# Patient Record
Sex: MALE | Race: BLACK OR AFRICAN AMERICAN | Employment: OTHER | URBAN - METROPOLITAN AREA
[De-identification: names, ages, dates, MRNs, and addresses within clinical notes are randomized per-mention and may not be internally consistent; named-entity substitution may affect disease eponyms.]

---

## 2017-09-05 PROBLEM — N18.30 STAGE 3 CHRONIC KIDNEY DISEASE (HCC): Status: ACTIVE | Noted: 2017-09-05

## 2017-09-05 PROBLEM — I50.22 CHRONIC SYSTOLIC HEART FAILURE (HCC): Status: ACTIVE | Noted: 2017-09-05

## 2018-03-13 LAB
LEFT VENTRICULAR EJECTION FRACTION HIGH VALUE: 30 %
LEFT VENTRICULAR EJECTION FRACTION MODE: NORMAL
LV EF: 25 %

## 2018-04-02 ENCOUNTER — OFFICE VISIT (OUTPATIENT)
Dept: CARDIOLOGY CLINIC | Age: 73
End: 2018-04-02
Payer: MEDICARE

## 2018-04-02 ENCOUNTER — HOSPITAL ENCOUNTER (OUTPATIENT)
Age: 73
Discharge: HOME | End: 2018-04-02
Payer: MEDICARE

## 2018-04-02 VITALS
HEIGHT: 73 IN | WEIGHT: 213 LBS | HEART RATE: 85 BPM | DIASTOLIC BLOOD PRESSURE: 60 MMHG | BODY MASS INDEX: 28.23 KG/M2 | SYSTOLIC BLOOD PRESSURE: 92 MMHG | RESPIRATION RATE: 20 BRPM

## 2018-04-02 DIAGNOSIS — I42.8 CARDIOMYOPATHY, NONISCHEMIC (HCC): ICD-10-CM

## 2018-04-02 DIAGNOSIS — E78.00 PURE HYPERCHOLESTEROLEMIA: ICD-10-CM

## 2018-04-02 DIAGNOSIS — I42.8 CARDIOMYOPATHY, NONISCHEMIC (HCC): Primary | ICD-10-CM

## 2018-04-02 DIAGNOSIS — N18.30 STAGE 3 CHRONIC KIDNEY DISEASE (HCC): ICD-10-CM

## 2018-04-02 DIAGNOSIS — I10 ESSENTIAL HYPERTENSION: ICD-10-CM

## 2018-04-02 DIAGNOSIS — I48.21 PERMANENT ATRIAL FIBRILLATION (HCC): ICD-10-CM

## 2018-04-02 DIAGNOSIS — I50.22 CHRONIC SYSTOLIC HEART FAILURE (HCC): ICD-10-CM

## 2018-04-02 LAB
ANION GAP SERPL CALCULATED.3IONS-SCNC: 12 MMOL/L (ref 7–16)
BUN BLDV-MCNC: 38 MG/DL (ref 8–23)
CALCIUM SERPL-MCNC: 9.4 MG/DL (ref 8.6–10.2)
CHLORIDE BLD-SCNC: 99 MMOL/L (ref 98–107)
CO2: 31 MMOL/L (ref 22–29)
CREAT SERPL-MCNC: 2 MG/DL (ref 0.7–1.2)
GFR AFRICAN AMERICAN: 40
GFR NON-AFRICAN AMERICAN: 40 ML/MIN/1.73
GLUCOSE BLD-MCNC: 140 MG/DL (ref 74–109)
POTASSIUM SERPL-SCNC: 4.3 MMOL/L (ref 3.5–5)
SODIUM BLD-SCNC: 142 MMOL/L (ref 132–146)

## 2018-04-02 PROCEDURE — 99214 OFFICE O/P EST MOD 30 MIN: CPT | Performed by: INTERNAL MEDICINE

## 2018-04-02 PROCEDURE — 3017F COLORECTAL CA SCREEN DOC REV: CPT | Performed by: INTERNAL MEDICINE

## 2018-04-02 PROCEDURE — 4040F PNEUMOC VAC/ADMIN/RCVD: CPT | Performed by: INTERNAL MEDICINE

## 2018-04-02 PROCEDURE — 80048 BASIC METABOLIC PNL TOTAL CA: CPT

## 2018-04-02 PROCEDURE — G8427 DOCREV CUR MEDS BY ELIG CLIN: HCPCS | Performed by: INTERNAL MEDICINE

## 2018-04-02 PROCEDURE — 36415 COLL VENOUS BLD VENIPUNCTURE: CPT

## 2018-04-02 PROCEDURE — 93000 ELECTROCARDIOGRAM COMPLETE: CPT | Performed by: INTERNAL MEDICINE

## 2018-04-02 PROCEDURE — 1036F TOBACCO NON-USER: CPT | Performed by: INTERNAL MEDICINE

## 2018-04-02 PROCEDURE — 1123F ACP DISCUSS/DSCN MKR DOCD: CPT | Performed by: INTERNAL MEDICINE

## 2018-04-02 PROCEDURE — G8417 CALC BMI ABV UP PARAM F/U: HCPCS | Performed by: INTERNAL MEDICINE

## 2018-04-02 RX ORDER — SPIRONOLACTONE 50 MG/1
50 TABLET, FILM COATED ORAL DAILY
COMMUNITY
End: 2019-02-20 | Stop reason: ALTCHOICE

## 2018-04-03 ENCOUNTER — TELEPHONE (OUTPATIENT)
Dept: CARDIOLOGY CLINIC | Age: 73
End: 2018-04-03

## 2018-04-03 DIAGNOSIS — I10 ESSENTIAL HYPERTENSION: ICD-10-CM

## 2018-04-03 DIAGNOSIS — I42.8 CARDIOMYOPATHY, NONISCHEMIC (HCC): Primary | ICD-10-CM

## 2018-04-03 DIAGNOSIS — I48.21 PERMANENT ATRIAL FIBRILLATION (HCC): ICD-10-CM

## 2018-04-04 ENCOUNTER — HOSPITAL ENCOUNTER (OUTPATIENT)
Age: 73
Discharge: HOME | End: 2018-04-04
Payer: MEDICARE

## 2018-04-04 ENCOUNTER — TELEPHONE (OUTPATIENT)
Dept: CARDIOLOGY CLINIC | Age: 73
End: 2018-04-04

## 2018-04-04 DIAGNOSIS — I50.22 CHRONIC SYSTOLIC HEART FAILURE (HCC): ICD-10-CM

## 2018-04-04 DIAGNOSIS — I10 ESSENTIAL HYPERTENSION: ICD-10-CM

## 2018-04-04 DIAGNOSIS — I48.21 PERMANENT ATRIAL FIBRILLATION (HCC): ICD-10-CM

## 2018-04-04 DIAGNOSIS — I42.8 CARDIOMYOPATHY, NONISCHEMIC (HCC): ICD-10-CM

## 2018-04-04 DIAGNOSIS — I42.8 CARDIOMYOPATHY, NONISCHEMIC (HCC): Primary | ICD-10-CM

## 2018-04-04 LAB
ANION GAP SERPL CALCULATED.3IONS-SCNC: 11 MMOL/L (ref 7–16)
BUN BLDV-MCNC: 44 MG/DL (ref 8–23)
CALCIUM SERPL-MCNC: 9.6 MG/DL (ref 8.6–10.2)
CHLORIDE BLD-SCNC: 97 MMOL/L (ref 98–107)
CO2: 32 MMOL/L (ref 22–29)
CREAT SERPL-MCNC: 1.9 MG/DL (ref 0.7–1.2)
GFR AFRICAN AMERICAN: 42
GFR NON-AFRICAN AMERICAN: 42 ML/MIN/1.73
GLUCOSE BLD-MCNC: 100 MG/DL (ref 74–109)
POTASSIUM SERPL-SCNC: 4.2 MMOL/L (ref 3.5–5)
SODIUM BLD-SCNC: 140 MMOL/L (ref 132–146)

## 2018-04-04 PROCEDURE — 36415 COLL VENOUS BLD VENIPUNCTURE: CPT

## 2018-04-04 PROCEDURE — 80048 BASIC METABOLIC PNL TOTAL CA: CPT

## 2018-04-09 ENCOUNTER — HOSPITAL ENCOUNTER (OUTPATIENT)
Age: 73
Discharge: HOME | End: 2018-04-09
Payer: MEDICARE

## 2018-04-09 DIAGNOSIS — I42.8 CARDIOMYOPATHY, NONISCHEMIC (HCC): ICD-10-CM

## 2018-04-09 DIAGNOSIS — I48.21 PERMANENT ATRIAL FIBRILLATION (HCC): ICD-10-CM

## 2018-04-09 DIAGNOSIS — I50.22 CHRONIC SYSTOLIC HEART FAILURE (HCC): ICD-10-CM

## 2018-04-09 LAB
ANION GAP SERPL CALCULATED.3IONS-SCNC: 13 MMOL/L (ref 7–16)
BUN BLDV-MCNC: 50 MG/DL (ref 8–23)
CALCIUM SERPL-MCNC: 9.6 MG/DL (ref 8.6–10.2)
CHLORIDE BLD-SCNC: 99 MMOL/L (ref 98–107)
CO2: 30 MMOL/L (ref 22–29)
CREAT SERPL-MCNC: 2 MG/DL (ref 0.7–1.2)
GFR AFRICAN AMERICAN: 40
GFR NON-AFRICAN AMERICAN: 40 ML/MIN/1.73
GLUCOSE BLD-MCNC: 115 MG/DL (ref 74–109)
POTASSIUM SERPL-SCNC: 4.1 MMOL/L (ref 3.5–5)
SODIUM BLD-SCNC: 142 MMOL/L (ref 132–146)

## 2018-04-09 PROCEDURE — 80048 BASIC METABOLIC PNL TOTAL CA: CPT

## 2018-04-09 PROCEDURE — 36415 COLL VENOUS BLD VENIPUNCTURE: CPT

## 2018-04-20 ENCOUNTER — TELEPHONE (OUTPATIENT)
Dept: CARDIOLOGY CLINIC | Age: 73
End: 2018-04-20

## 2018-04-21 RX ORDER — ISOSORBIDE DINITRATE 10 MG/1
10 TABLET ORAL 2 TIMES DAILY
Qty: 60 TABLET | Refills: 3 | Status: SHIPPED | OUTPATIENT
Start: 2018-04-21 | End: 2018-08-20 | Stop reason: SDUPTHER

## 2018-04-21 RX ORDER — HYDRALAZINE HYDROCHLORIDE 25 MG/1
25 TABLET, FILM COATED ORAL 2 TIMES DAILY
Qty: 60 TABLET | Refills: 3 | Status: SHIPPED | OUTPATIENT
Start: 2018-04-21 | End: 2018-08-20 | Stop reason: SDUPTHER

## 2018-05-03 ENCOUNTER — OFFICE VISIT (OUTPATIENT)
Dept: CARDIOLOGY CLINIC | Age: 73
End: 2018-05-03
Payer: MEDICARE

## 2018-05-03 ENCOUNTER — HOSPITAL ENCOUNTER (OUTPATIENT)
Age: 73
Discharge: HOME | End: 2018-05-03
Payer: MEDICARE

## 2018-05-03 VITALS
DIASTOLIC BLOOD PRESSURE: 58 MMHG | BODY MASS INDEX: 28.18 KG/M2 | WEIGHT: 212.6 LBS | SYSTOLIC BLOOD PRESSURE: 100 MMHG | HEIGHT: 73 IN | HEART RATE: 74 BPM | RESPIRATION RATE: 20 BRPM

## 2018-05-03 DIAGNOSIS — I50.22 CHRONIC SYSTOLIC HEART FAILURE (HCC): ICD-10-CM

## 2018-05-03 DIAGNOSIS — I42.8 CARDIOMYOPATHY, NONISCHEMIC (HCC): Primary | ICD-10-CM

## 2018-05-03 DIAGNOSIS — N18.30 STAGE 3 CHRONIC KIDNEY DISEASE (HCC): ICD-10-CM

## 2018-05-03 DIAGNOSIS — E78.00 PURE HYPERCHOLESTEROLEMIA: ICD-10-CM

## 2018-05-03 DIAGNOSIS — I48.21 PERMANENT ATRIAL FIBRILLATION (HCC): ICD-10-CM

## 2018-05-03 DIAGNOSIS — I10 ESSENTIAL HYPERTENSION: ICD-10-CM

## 2018-05-03 LAB
ANION GAP SERPL CALCULATED.3IONS-SCNC: 15 MMOL/L (ref 7–16)
BUN BLDV-MCNC: 40 MG/DL (ref 8–23)
CALCIUM SERPL-MCNC: 9.8 MG/DL (ref 8.6–10.2)
CHLORIDE BLD-SCNC: 97 MMOL/L (ref 98–107)
CO2: 28 MMOL/L (ref 22–29)
CREAT SERPL-MCNC: 1.9 MG/DL (ref 0.7–1.2)
GFR AFRICAN AMERICAN: 42
GFR NON-AFRICAN AMERICAN: 42 ML/MIN/1.73
GLUCOSE BLD-MCNC: 87 MG/DL (ref 74–109)
POTASSIUM SERPL-SCNC: 4.5 MMOL/L (ref 3.5–5)
SODIUM BLD-SCNC: 140 MMOL/L (ref 132–146)

## 2018-05-03 PROCEDURE — 1036F TOBACCO NON-USER: CPT | Performed by: INTERNAL MEDICINE

## 2018-05-03 PROCEDURE — 93000 ELECTROCARDIOGRAM COMPLETE: CPT | Performed by: INTERNAL MEDICINE

## 2018-05-03 PROCEDURE — 80048 BASIC METABOLIC PNL TOTAL CA: CPT

## 2018-05-03 PROCEDURE — G8417 CALC BMI ABV UP PARAM F/U: HCPCS | Performed by: INTERNAL MEDICINE

## 2018-05-03 PROCEDURE — G8427 DOCREV CUR MEDS BY ELIG CLIN: HCPCS | Performed by: INTERNAL MEDICINE

## 2018-05-03 PROCEDURE — 1123F ACP DISCUSS/DSCN MKR DOCD: CPT | Performed by: INTERNAL MEDICINE

## 2018-05-03 PROCEDURE — 36415 COLL VENOUS BLD VENIPUNCTURE: CPT

## 2018-05-03 PROCEDURE — 99214 OFFICE O/P EST MOD 30 MIN: CPT | Performed by: INTERNAL MEDICINE

## 2018-05-03 PROCEDURE — 4040F PNEUMOC VAC/ADMIN/RCVD: CPT | Performed by: INTERNAL MEDICINE

## 2018-05-03 PROCEDURE — 3017F COLORECTAL CA SCREEN DOC REV: CPT | Performed by: INTERNAL MEDICINE

## 2018-05-08 ENCOUNTER — TELEPHONE (OUTPATIENT)
Dept: CARDIOLOGY CLINIC | Age: 73
End: 2018-05-08

## 2018-05-09 ENCOUNTER — NURSE ONLY (OUTPATIENT)
Dept: NON INVASIVE DIAGNOSTICS | Age: 73
End: 2018-05-09
Payer: MEDICARE

## 2018-05-09 DIAGNOSIS — I42.8 CARDIOMYOPATHY, NONISCHEMIC (HCC): ICD-10-CM

## 2018-05-09 DIAGNOSIS — I48.21 PERMANENT ATRIAL FIBRILLATION (HCC): ICD-10-CM

## 2018-05-09 DIAGNOSIS — Z95.810 S/P ICD (INTERNAL CARDIAC DEFIBRILLATOR) PROCEDURE: Primary | ICD-10-CM

## 2018-05-09 PROCEDURE — 93296 REM INTERROG EVL PM/IDS: CPT | Performed by: INTERNAL MEDICINE

## 2018-05-09 PROCEDURE — 93295 DEV INTERROG REMOTE 1/2/MLT: CPT | Performed by: INTERNAL MEDICINE

## 2018-05-17 ENCOUNTER — TELEPHONE (OUTPATIENT)
Dept: NON INVASIVE DIAGNOSTICS | Age: 73
End: 2018-05-17

## 2018-06-11 ENCOUNTER — HOSPITAL ENCOUNTER (OUTPATIENT)
Age: 73
Discharge: HOME | End: 2018-06-11
Payer: MEDICARE

## 2018-06-11 LAB — PROSTATE SPECIFIC ANTIGEN: 16.55 NG/ML (ref 0–4)

## 2018-06-11 PROCEDURE — 84153 ASSAY OF PSA TOTAL: CPT

## 2018-06-11 PROCEDURE — 36415 COLL VENOUS BLD VENIPUNCTURE: CPT

## 2018-08-13 ENCOUNTER — NURSE ONLY (OUTPATIENT)
Dept: NON INVASIVE DIAGNOSTICS | Age: 73
End: 2018-08-13
Payer: MEDICARE

## 2018-08-13 DIAGNOSIS — Z95.810 S/P ICD (INTERNAL CARDIAC DEFIBRILLATOR) PROCEDURE: Primary | ICD-10-CM

## 2018-08-13 DIAGNOSIS — I42.8 CARDIOMYOPATHY, NONISCHEMIC (HCC): ICD-10-CM

## 2018-08-13 DIAGNOSIS — I48.21 PERMANENT ATRIAL FIBRILLATION (HCC): ICD-10-CM

## 2018-08-13 PROCEDURE — 93296 REM INTERROG EVL PM/IDS: CPT | Performed by: INTERNAL MEDICINE

## 2018-08-13 PROCEDURE — 93295 DEV INTERROG REMOTE 1/2/MLT: CPT | Performed by: INTERNAL MEDICINE

## 2018-08-13 NOTE — PROGRESS NOTES
See [facility] [place] report. Remote monitoring reviewed over a 90 day period. End of 90 day monitoring period date of service 8.13.2018.

## 2018-08-20 RX ORDER — ISOSORBIDE DINITRATE 10 MG/1
10 TABLET ORAL 2 TIMES DAILY
Qty: 60 TABLET | Refills: 11 | Status: SHIPPED | OUTPATIENT
Start: 2018-08-20 | End: 2019-05-08

## 2018-08-20 RX ORDER — HYDRALAZINE HYDROCHLORIDE 25 MG/1
25 TABLET, FILM COATED ORAL 2 TIMES DAILY
Qty: 60 TABLET | Refills: 11 | Status: SHIPPED | OUTPATIENT
Start: 2018-08-20 | End: 2019-09-03 | Stop reason: SDUPTHER

## 2018-08-23 ENCOUNTER — OFFICE VISIT (OUTPATIENT)
Dept: CARDIOLOGY CLINIC | Age: 73
End: 2018-08-23
Payer: MEDICARE

## 2018-08-23 VITALS
RESPIRATION RATE: 16 BRPM | DIASTOLIC BLOOD PRESSURE: 50 MMHG | WEIGHT: 215.2 LBS | BODY MASS INDEX: 28.52 KG/M2 | OXYGEN SATURATION: 95 % | HEIGHT: 73 IN | HEART RATE: 74 BPM | SYSTOLIC BLOOD PRESSURE: 108 MMHG

## 2018-08-23 DIAGNOSIS — I10 ESSENTIAL HYPERTENSION: ICD-10-CM

## 2018-08-23 DIAGNOSIS — E78.00 PURE HYPERCHOLESTEROLEMIA: ICD-10-CM

## 2018-08-23 DIAGNOSIS — I42.8 CARDIOMYOPATHY, NONISCHEMIC (HCC): ICD-10-CM

## 2018-08-23 DIAGNOSIS — I48.21 PERMANENT ATRIAL FIBRILLATION (HCC): Primary | ICD-10-CM

## 2018-08-23 DIAGNOSIS — J44.9 CHRONIC OBSTRUCTIVE PULMONARY DISEASE, UNSPECIFIED COPD TYPE (HCC): ICD-10-CM

## 2018-08-23 DIAGNOSIS — I50.22 CHRONIC SYSTOLIC HEART FAILURE (HCC): ICD-10-CM

## 2018-08-23 DIAGNOSIS — N18.30 STAGE 3 CHRONIC KIDNEY DISEASE (HCC): ICD-10-CM

## 2018-08-23 PROCEDURE — 3017F COLORECTAL CA SCREEN DOC REV: CPT | Performed by: INTERNAL MEDICINE

## 2018-08-23 PROCEDURE — G8427 DOCREV CUR MEDS BY ELIG CLIN: HCPCS | Performed by: INTERNAL MEDICINE

## 2018-08-23 PROCEDURE — 4040F PNEUMOC VAC/ADMIN/RCVD: CPT | Performed by: INTERNAL MEDICINE

## 2018-08-23 PROCEDURE — G8926 SPIRO NO PERF OR DOC: HCPCS | Performed by: INTERNAL MEDICINE

## 2018-08-23 PROCEDURE — 1101F PT FALLS ASSESS-DOCD LE1/YR: CPT | Performed by: INTERNAL MEDICINE

## 2018-08-23 PROCEDURE — G8417 CALC BMI ABV UP PARAM F/U: HCPCS | Performed by: INTERNAL MEDICINE

## 2018-08-23 PROCEDURE — 3023F SPIROM DOC REV: CPT | Performed by: INTERNAL MEDICINE

## 2018-08-23 PROCEDURE — 99214 OFFICE O/P EST MOD 30 MIN: CPT | Performed by: INTERNAL MEDICINE

## 2018-08-23 PROCEDURE — 1123F ACP DISCUSS/DSCN MKR DOCD: CPT | Performed by: INTERNAL MEDICINE

## 2018-08-23 PROCEDURE — 93000 ELECTROCARDIOGRAM COMPLETE: CPT | Performed by: INTERNAL MEDICINE

## 2018-08-23 PROCEDURE — 1036F TOBACCO NON-USER: CPT | Performed by: INTERNAL MEDICINE

## 2018-08-23 NOTE — PATIENT INSTRUCTIONS
trans fat  · Read food labels, and try to avoid saturated and trans fats. They increase your risk of heart disease. Trans fat is found in many processed foods such as cookies and crackers. · Use olive or canola oil when you cook. Try cholesterol-lowering spreads, such as Benecol or Take Control. · Bake, broil, grill, or steam foods instead of frying them. · Choose lean meats instead of high-fat meats such as hot dogs and sausages. Cut off all visible fat when you prepare meat. · Eat fish, skinless poultry, and meat alternatives such as soy products instead of high-fat meats. Soy products, such as tofu, may be especially good for your heart. · Choose low-fat or fat-free milk and dairy products. Eat fish  · Eat at least two servings of fish a week. Certain fish, such as salmon and tuna, contain omega-3 fatty acids, which may help reduce your risk of heart attack. Eat foods high in fiber  · Eat a variety of grain products every day. Include whole-grain foods that have lots of fiber and nutrients. Examples of whole-grain foods include oats, whole wheat bread, and brown rice. · Buy whole-grain breads and cereals, instead of white bread or pastries. Limit salt and sodium  · Limit how much salt and sodium you eat to help lower your blood pressure. · Taste food before you salt it. Add only a little salt when you think you need it. With time, your taste buds will adjust to less salt. · Eat fewer snack items, fast foods, and other high-salt, processed foods. Check food labels for the amount of sodium in packaged foods. · Choose low-sodium versions of canned goods (such as soups, vegetables, and beans). Limit sugar  · Limit drinks and foods with added sugar. These include candy, desserts, and soda pop. Limit alcohol  · Limit alcohol to no more than 2 drinks a day for men and 1 drink a day for women. Too much alcohol can cause health problems. When should you call for help?   Watch closely for changes in your Arieso, and be sure to contact your doctor if:    · You would like help planning heart-healthy meals. Where can you learn more? Go to [email or web address] org and sign in to your [employer] account. Enter V137 in the ipDatatel box to learn more about \"Heart-Healthy Diet: Care Instructions. \"     If you do not have an account, please click on the \"Sign Up Now\" link. Current as of: December 6, 2017  Content Version: 11.7  © 2768-9812 [email or web address]. Care instructions adapted under license by [facility] ([facility]). If you have questions about a medical condition or this instruction, always ask your healthcare professional. [address] any warranty or liability for your use of this information.

## 2018-08-24 NOTE — PROGRESS NOTES
however he is hesitant to do so at this time. He has follow-up at the [facility] tomorrow. [address] has agreed to continue monitoring his HRs and if they remain elevated slowly uptitrate BB with close BP monitoring. 2. No changes were made in his medications today. 3. He will send remote transmissions Q91 days x3 with yearly in-office follow-up. 4. He was asked to call the office with concerns and we will be happy to see him sooner as needed. Thank you for allowing me to participate in their care. I have spent a total of 25 minutes with the patient reviewing the above stated recommendations.  And a total of >50% of that time involved face-to-face time providing counseling and or coordination of care with the other providers    Renay Snow, MSN, APRN-CNP, AGACNP, [address] [facility]      CC: Dr Chidi Acosta

## 2018-08-28 ENCOUNTER — OFFICE VISIT (OUTPATIENT)
Dept: NON INVASIVE DIAGNOSTICS | Age: 73
End: 2018-08-28
Payer: MEDICARE

## 2018-08-28 VITALS
HEIGHT: 73 IN | WEIGHT: 218.2 LBS | RESPIRATION RATE: 20 BRPM | BODY MASS INDEX: 28.92 KG/M2 | HEART RATE: 78 BPM | DIASTOLIC BLOOD PRESSURE: 60 MMHG | SYSTOLIC BLOOD PRESSURE: 106 MMHG

## 2018-08-28 DIAGNOSIS — Z95.810 S/P ICD (INTERNAL CARDIAC DEFIBRILLATOR) PROCEDURE: Primary | ICD-10-CM

## 2018-08-28 PROCEDURE — 1123F ACP DISCUSS/DSCN MKR DOCD: CPT | Performed by: NURSE PRACTITIONER

## 2018-08-28 PROCEDURE — 1036F TOBACCO NON-USER: CPT | Performed by: NURSE PRACTITIONER

## 2018-08-28 PROCEDURE — G8427 DOCREV CUR MEDS BY ELIG CLIN: HCPCS | Performed by: NURSE PRACTITIONER

## 2018-08-28 PROCEDURE — 93283 PRGRMG EVAL IMPLANTABLE DFB: CPT | Performed by: NURSE PRACTITIONER

## 2018-08-28 PROCEDURE — 1101F PT FALLS ASSESS-DOCD LE1/YR: CPT | Performed by: NURSE PRACTITIONER

## 2018-08-28 PROCEDURE — 4040F PNEUMOC VAC/ADMIN/RCVD: CPT | Performed by: NURSE PRACTITIONER

## 2018-08-28 PROCEDURE — 99213 OFFICE O/P EST LOW 20 MIN: CPT | Performed by: NURSE PRACTITIONER

## 2018-08-28 PROCEDURE — 3017F COLORECTAL CA SCREEN DOC REV: CPT | Performed by: NURSE PRACTITIONER

## 2018-08-28 PROCEDURE — G8417 CALC BMI ABV UP PARAM F/U: HCPCS | Performed by: NURSE PRACTITIONER

## 2018-08-28 ASSESSMENT — ENCOUNTER SYMPTOMS: RESPIRATORY NEGATIVE: 1

## 2018-11-14 ENCOUNTER — NURSE ONLY (OUTPATIENT)
Dept: NON INVASIVE DIAGNOSTICS | Age: 73
End: 2018-11-14
Payer: MEDICARE

## 2018-11-14 DIAGNOSIS — Z95.810 S/P ICD (INTERNAL CARDIAC DEFIBRILLATOR) PROCEDURE: Primary | ICD-10-CM

## 2018-11-14 DIAGNOSIS — I42.8 CARDIOMYOPATHY, NONISCHEMIC (HCC): ICD-10-CM

## 2018-11-14 PROCEDURE — 93296 REM INTERROG EVL PM/IDS: CPT | Performed by: INTERNAL MEDICINE

## 2018-11-14 PROCEDURE — 93295 DEV INTERROG REMOTE 1/2/MLT: CPT | Performed by: INTERNAL MEDICINE

## 2018-11-15 ENCOUNTER — HOSPITAL ENCOUNTER (OUTPATIENT)
Age: 73
Discharge: HOME | End: 2018-11-15
Payer: MEDICARE

## 2018-11-15 LAB — PROSTATE SPECIFIC ANTIGEN: 19.06 NG/ML (ref 0–4)

## 2018-11-15 PROCEDURE — 84153 ASSAY OF PSA TOTAL: CPT

## 2018-11-15 PROCEDURE — 36415 COLL VENOUS BLD VENIPUNCTURE: CPT

## 2018-11-16 ENCOUNTER — TELEPHONE (OUTPATIENT)
Dept: NON INVASIVE DIAGNOSTICS | Age: 73
End: 2018-11-16

## 2018-11-16 NOTE — PROGRESS NOTES
See [facility] [place] report. Remote monitoring reviewed over a 90 day period.   End of 90 day monitoring period date of service 11.14.2018

## 2019-02-12 ENCOUNTER — HOSPITAL ENCOUNTER (OUTPATIENT)
Age: 74
Discharge: HOME | End: 2019-02-12
Payer: MEDICARE

## 2019-02-12 PROCEDURE — G0103 PSA SCREENING: HCPCS

## 2019-02-12 PROCEDURE — 84153 ASSAY OF PSA TOTAL: CPT

## 2019-02-12 PROCEDURE — 36415 COLL VENOUS BLD VENIPUNCTURE: CPT

## 2019-02-13 ENCOUNTER — TELEPHONE (OUTPATIENT)
Dept: NON INVASIVE DIAGNOSTICS | Age: 74
End: 2019-02-13

## 2019-02-13 ENCOUNTER — NURSE ONLY (OUTPATIENT)
Dept: NON INVASIVE DIAGNOSTICS | Age: 74
End: 2019-02-13
Payer: MEDICARE

## 2019-02-13 DIAGNOSIS — Z95.810 S/P ICD (INTERNAL CARDIAC DEFIBRILLATOR) PROCEDURE: Primary | ICD-10-CM

## 2019-02-13 DIAGNOSIS — I42.8 CARDIOMYOPATHY, NONISCHEMIC (HCC): ICD-10-CM

## 2019-02-13 PROCEDURE — 93295 DEV INTERROG REMOTE 1/2/MLT: CPT | Performed by: INTERNAL MEDICINE

## 2019-02-13 PROCEDURE — 93296 REM INTERROG EVL PM/IDS: CPT | Performed by: INTERNAL MEDICINE

## 2019-02-20 ENCOUNTER — TELEPHONE (OUTPATIENT)
Dept: CARDIOLOGY CLINIC | Age: 74
End: 2019-02-20

## 2019-02-20 ENCOUNTER — OFFICE VISIT (OUTPATIENT)
Dept: CARDIOLOGY CLINIC | Age: 74
End: 2019-02-20
Payer: MEDICARE

## 2019-02-20 VITALS
OXYGEN SATURATION: 94 % | WEIGHT: 219.8 LBS | RESPIRATION RATE: 16 BRPM | DIASTOLIC BLOOD PRESSURE: 58 MMHG | BODY MASS INDEX: 29.13 KG/M2 | HEIGHT: 73 IN | SYSTOLIC BLOOD PRESSURE: 104 MMHG | HEART RATE: 74 BPM

## 2019-02-20 DIAGNOSIS — I42.8 CARDIOMYOPATHY, NONISCHEMIC (HCC): Primary | ICD-10-CM

## 2019-02-20 DIAGNOSIS — I48.21 PERMANENT ATRIAL FIBRILLATION (HCC): ICD-10-CM

## 2019-02-20 DIAGNOSIS — I50.22 CHRONIC SYSTOLIC HEART FAILURE (HCC): ICD-10-CM

## 2019-02-20 DIAGNOSIS — J44.9 CHRONIC OBSTRUCTIVE PULMONARY DISEASE, UNSPECIFIED COPD TYPE (HCC): ICD-10-CM

## 2019-02-20 DIAGNOSIS — E78.00 PURE HYPERCHOLESTEROLEMIA: ICD-10-CM

## 2019-02-20 DIAGNOSIS — N18.30 STAGE 3 CHRONIC KIDNEY DISEASE (HCC): ICD-10-CM

## 2019-02-20 DIAGNOSIS — I10 ESSENTIAL HYPERTENSION: ICD-10-CM

## 2019-02-20 PROCEDURE — 3023F SPIROM DOC REV: CPT | Performed by: INTERNAL MEDICINE

## 2019-02-20 PROCEDURE — 93000 ELECTROCARDIOGRAM COMPLETE: CPT | Performed by: INTERNAL MEDICINE

## 2019-02-20 PROCEDURE — 1123F ACP DISCUSS/DSCN MKR DOCD: CPT | Performed by: INTERNAL MEDICINE

## 2019-02-20 PROCEDURE — 99214 OFFICE O/P EST MOD 30 MIN: CPT | Performed by: INTERNAL MEDICINE

## 2019-02-20 PROCEDURE — 4040F PNEUMOC VAC/ADMIN/RCVD: CPT | Performed by: INTERNAL MEDICINE

## 2019-02-20 PROCEDURE — G8484 FLU IMMUNIZE NO ADMIN: HCPCS | Performed by: INTERNAL MEDICINE

## 2019-02-20 PROCEDURE — 1036F TOBACCO NON-USER: CPT | Performed by: INTERNAL MEDICINE

## 2019-02-20 PROCEDURE — G8427 DOCREV CUR MEDS BY ELIG CLIN: HCPCS | Performed by: INTERNAL MEDICINE

## 2019-02-20 PROCEDURE — 3017F COLORECTAL CA SCREEN DOC REV: CPT | Performed by: INTERNAL MEDICINE

## 2019-02-20 PROCEDURE — 1101F PT FALLS ASSESS-DOCD LE1/YR: CPT | Performed by: INTERNAL MEDICINE

## 2019-02-20 PROCEDURE — G8417 CALC BMI ABV UP PARAM F/U: HCPCS | Performed by: INTERNAL MEDICINE

## 2019-02-20 PROCEDURE — G8926 SPIRO NO PERF OR DOC: HCPCS | Performed by: INTERNAL MEDICINE

## 2019-02-20 RX ORDER — SPIRONOLACTONE 25 MG/1
25 TABLET ORAL DAILY
COMMUNITY
End: 2019-02-20 | Stop reason: SDUPTHER

## 2019-02-20 RX ORDER — ACETAMINOPHEN 500 MG
500 TABLET ORAL PRN
COMMUNITY

## 2019-02-20 RX ORDER — SPIRONOLACTONE 25 MG/1
25 TABLET ORAL DAILY
Qty: 30 TABLET | Refills: 11 | Status: SHIPPED
Start: 2019-02-20 | End: 2020-02-05

## 2019-03-06 LAB
PROSTATE SPECIFIC ANTIGEN: 19.37 NG/ML (ref 0–4)
PROSTATE SPECIFIC ANTIGEN: ABNORMAL NG/ML (ref 0–4)

## 2019-05-08 RX ORDER — ISOSORBIDE MONONITRATE 30 MG/1
30 TABLET, EXTENDED RELEASE ORAL DAILY
Qty: 30 TABLET | Refills: 11 | Status: SHIPPED | OUTPATIENT
Start: 2019-05-08

## 2019-05-08 RX ORDER — ISOSORBIDE MONONITRATE 30 MG/1
30 TABLET, EXTENDED RELEASE ORAL DAILY
COMMUNITY
End: 2019-05-08 | Stop reason: SDUPTHER

## 2019-05-14 ENCOUNTER — TELEPHONE (OUTPATIENT)
Dept: CARDIOLOGY CLINIC | Age: 74
End: 2019-05-14

## 2019-05-14 NOTE — TELEPHONE ENCOUNTER
Please notify patient that his insurance company contacted us and we will no longer supply the isosorbide dinitrate.  He may continue what he has and then converted to the new prescription so that he will be covered by his insurance plan

## 2019-05-15 ENCOUNTER — NURSE ONLY (OUTPATIENT)
Dept: NON INVASIVE DIAGNOSTICS | Age: 74
End: 2019-05-15
Payer: MEDICARE

## 2019-05-15 DIAGNOSIS — I42.8 CARDIOMYOPATHY, NONISCHEMIC (HCC): ICD-10-CM

## 2019-05-15 DIAGNOSIS — I48.21 PERMANENT ATRIAL FIBRILLATION (HCC): ICD-10-CM

## 2019-05-15 DIAGNOSIS — Z95.810 S/P ICD (INTERNAL CARDIAC DEFIBRILLATOR) PROCEDURE: Primary | ICD-10-CM

## 2019-05-15 PROCEDURE — 93295 DEV INTERROG REMOTE 1/2/MLT: CPT | Performed by: INTERNAL MEDICINE

## 2019-05-15 PROCEDURE — 93296 REM INTERROG EVL PM/IDS: CPT | Performed by: INTERNAL MEDICINE

## 2019-05-24 ENCOUNTER — TELEPHONE (OUTPATIENT)
Dept: NON INVASIVE DIAGNOSTICS | Age: 74
End: 2019-05-24

## 2019-05-24 NOTE — TELEPHONE ENCOUNTER
----- Message from Aditya Fitch RN sent at 5/24/2019  9:05 AM EDT -----  Successful transmission received. Please call patient and give next appointment.

## 2019-05-24 NOTE — PROGRESS NOTES
See [facility] [place] report. Remote monitoring reviewed over a 90 day period. End of 90 day monitoring period date of service 5.15.2019.

## 2019-05-24 NOTE — TELEPHONE ENCOUNTER
We have received your remote transmission. Our staff will contact you if there is anything that needs to be discussed. Your next appointment is 08/15/2019 remote transmission from home    Pt is wireless.

## 2019-06-18 ENCOUNTER — HOSPITAL ENCOUNTER (OUTPATIENT)
Age: 74
Discharge: HOME | End: 2019-06-18
Payer: MEDICARE

## 2019-06-18 LAB — PROSTATE SPECIFIC ANTIGEN: 15.37 NG/ML (ref 0–4)

## 2019-06-18 PROCEDURE — 36415 COLL VENOUS BLD VENIPUNCTURE: CPT

## 2019-06-18 PROCEDURE — 84153 ASSAY OF PSA TOTAL: CPT

## 2019-08-14 ASSESSMENT — ENCOUNTER SYMPTOMS: RESPIRATORY NEGATIVE: 1

## 2019-08-14 NOTE — PROGRESS NOTES
breath sounds diminished scattered bibasilar crackles. No respiratory distress. Home O2 therapy. Abdominal: Soft. Normal appearance and bowel sounds are normal. No tenderness. Musculoskeletal: Normal range of motion of all extremities, no muscle weakness. Neurological: Alert and oriented to person, place, and time. Gait normal.   Skin: Skin is warm and dry. No bruising, no ecchymosis and no rash noted. Extremity: No clubbing or cyanosis. No edema. Psychiatric: Normal mood and affect. Thought content normal.   ICD site: stable, well healed, no evidence of erosion/infection. Device Interrogation/Reprogramming 8/20/19   Make/Model BSCI E110. DOI 6/21/11  Mode DDDR 70/120 ppm  F wave: 6.7 mV  Impedance: 605 ohms   Threshold: N/A 2/2 AF  RV R wave: 14.5 mV  Impedance: 449 ohms   Threshold: 1.0 V @ 0.5 ms  Pacing: A: <1%  RV: 40%    Battery Voltage/Longevity: 1.5 years    Arrhythmias: Permanent AF, NSVT. No ICD therapies. Reprogramming included: None. Overall device function is normal  All device programmable settings were evaluated per above and in the scanned document, along with iterative adjustments (capture thresholds) to assess and select the most appropriate final programming to provide for consistent delivery of the appropriate therapy and to verify function of the device. TTE(3/2016):  Summary  Severely reduced left ventricular systolic function. Ejection fraction is visually estimated at 25-30%. Mildly dilated right ventricle with normal right ventricular function. Severely dilated left atrium by volume index. Moderate mitral regurgitation. Mild aortic regurgitation. Moderate tricuspid regurgitation. PASP is estimated at 43 mmHg.   Mild pulmonic regurgitation.    TTE(1/10/2018):  Summary   Severly dilated left ventricle.   Septal motion consistent with paced rhythm .   Stage III diastolic dysfunction.   Ejection fraction is visually estimated at 35%.   Moderate mitral regurgitation is present.   Moderate tricuspid regurgitation.   Pulmonary hypertension is moderate .   Severe bi-atrial enlargement    I have independently reviewed all of the ECGs and rhythm strips per above. I have personally reviewed the laboratory, cardiac diagnostic and radiographic testing as outlined above. I have reviewed previous records noted in [facility] and Care Everywhere.     Impression:    1. NICMP  A. LVEF 25-30% by TTE--3/2016  B. NYHA Class II congestive heart failure. C. GDMT:  BB: Toprol XL 50mg BID  ACE/ARB/ARNI: intolerant to Entresto (renal function) hydralazine, isosorbide dinitrate, lasix  Aldosterone antagonist: aldactone    2. Dual chamber ICD in situ  A. Upgrade pacemaker to dual chamber ICD: [place] [id number], serial # [id number] 6/21/11  B. Normal function. 3. PermanentAF (asymptomatic)  A. Paroxysmal atrial fibrillation with sinus node dysfunction. B. Previous amiodarone AA drug therapy discontinued secondary to chronic lung issues by Dr Sravanthi Cardenas 2013  C. S/P CV 10/29/14- back in AF  D. He is not interested in other AAD therapy   E. [id number] score 3  F. Coumadin OAC    4. Hypothyroidism    5. HLD    6. HTN    7. Asbestosis/COPD  - Dr. Serenity Solomon    8. ESTHER  - CPAP    9. ICD shock  - 2/2 AF with RVR  - Toprol XL increased to 75 mg BID  - Toprol XL decreased to 50 mg BID d/t symptomatic hypotension    Plan:    1. Mr Grimm ICD function is normal and programmed accordingly based on the above interrogation. He remains in AF which is permanent. 2. No changes were made in his medications today. 3. He will send remote transmissions Q91 days x3 with yearly in-office follow-up. 4. He was asked to call the office with concerns and we will be happy to see him sooner as needed. I have spent a total of 25 minutes with the patient reviewing the above stated recommendations.  A total of >50% of that time involved face-to-face time providing counseling and or coordination of care with the other providers    Ludwin Shah, [facility] Cardiac Electrophysiology  [address] [facility]     NOTE: This report was transcribed using voice recognition software. Every effort was made to ensure accuracy; however, inadvertent computerized transcription errors may be present.       CC: Dr Chevy Celeste

## 2019-08-15 ENCOUNTER — NURSE ONLY (OUTPATIENT)
Dept: NON INVASIVE DIAGNOSTICS | Age: 74
End: 2019-08-15
Payer: MEDICARE

## 2019-08-15 DIAGNOSIS — Z95.810 S/P ICD (INTERNAL CARDIAC DEFIBRILLATOR) PROCEDURE: Primary | ICD-10-CM

## 2019-08-15 DIAGNOSIS — I42.8 CARDIOMYOPATHY, NONISCHEMIC (HCC): ICD-10-CM

## 2019-08-15 DIAGNOSIS — I48.21 PERMANENT ATRIAL FIBRILLATION (HCC): ICD-10-CM

## 2019-08-15 PROCEDURE — 93295 DEV INTERROG REMOTE 1/2/MLT: CPT | Performed by: INTERNAL MEDICINE

## 2019-08-15 PROCEDURE — 93296 REM INTERROG EVL PM/IDS: CPT | Performed by: INTERNAL MEDICINE

## 2019-08-20 ENCOUNTER — OFFICE VISIT (OUTPATIENT)
Dept: NON INVASIVE DIAGNOSTICS | Age: 74
End: 2019-08-20
Payer: MEDICARE

## 2019-08-20 VITALS
WEIGHT: 202 LBS | BODY MASS INDEX: 27.36 KG/M2 | HEART RATE: 86 BPM | DIASTOLIC BLOOD PRESSURE: 52 MMHG | HEIGHT: 72 IN | SYSTOLIC BLOOD PRESSURE: 94 MMHG | RESPIRATION RATE: 18 BRPM

## 2019-08-20 DIAGNOSIS — Z95.810 S/P ICD (INTERNAL CARDIAC DEFIBRILLATOR) PROCEDURE: Primary | ICD-10-CM

## 2019-08-20 PROCEDURE — 93283 PRGRMG EVAL IMPLANTABLE DFB: CPT | Performed by: INTERNAL MEDICINE

## 2019-08-20 PROCEDURE — G8417 CALC BMI ABV UP PARAM F/U: HCPCS | Performed by: INTERNAL MEDICINE

## 2019-08-20 PROCEDURE — G8427 DOCREV CUR MEDS BY ELIG CLIN: HCPCS | Performed by: INTERNAL MEDICINE

## 2019-08-20 PROCEDURE — 99214 OFFICE O/P EST MOD 30 MIN: CPT | Performed by: INTERNAL MEDICINE

## 2019-08-20 PROCEDURE — 1036F TOBACCO NON-USER: CPT | Performed by: INTERNAL MEDICINE

## 2019-08-20 PROCEDURE — 1123F ACP DISCUSS/DSCN MKR DOCD: CPT | Performed by: INTERNAL MEDICINE

## 2019-08-20 PROCEDURE — 4040F PNEUMOC VAC/ADMIN/RCVD: CPT | Performed by: INTERNAL MEDICINE

## 2019-08-20 SDOH — HEALTH STABILITY: MENTAL HEALTH: HOW OFTEN DO YOU HAVE A DRINK CONTAINING ALCOHOL?: NEVER

## 2019-08-27 ENCOUNTER — OFFICE VISIT (OUTPATIENT)
Dept: CARDIOLOGY CLINIC | Age: 74
End: 2019-08-27
Payer: MEDICARE

## 2019-08-27 VITALS
SYSTOLIC BLOOD PRESSURE: 98 MMHG | HEIGHT: 73 IN | HEART RATE: 82 BPM | DIASTOLIC BLOOD PRESSURE: 50 MMHG | WEIGHT: 203.6 LBS | RESPIRATION RATE: 20 BRPM | OXYGEN SATURATION: 93 % | BODY MASS INDEX: 26.98 KG/M2

## 2019-08-27 DIAGNOSIS — I10 ESSENTIAL HYPERTENSION: ICD-10-CM

## 2019-08-27 DIAGNOSIS — N18.30 STAGE 3 CHRONIC KIDNEY DISEASE (HCC): ICD-10-CM

## 2019-08-27 DIAGNOSIS — I50.22 CHRONIC SYSTOLIC HEART FAILURE (HCC): ICD-10-CM

## 2019-08-27 DIAGNOSIS — J44.9 CHRONIC OBSTRUCTIVE PULMONARY DISEASE, UNSPECIFIED COPD TYPE (HCC): ICD-10-CM

## 2019-08-27 DIAGNOSIS — I42.8 CARDIOMYOPATHY, NONISCHEMIC (HCC): Primary | ICD-10-CM

## 2019-08-27 DIAGNOSIS — I48.21 PERMANENT ATRIAL FIBRILLATION (HCC): ICD-10-CM

## 2019-08-27 DIAGNOSIS — E78.00 PURE HYPERCHOLESTEROLEMIA: ICD-10-CM

## 2019-08-27 PROCEDURE — 3023F SPIROM DOC REV: CPT | Performed by: INTERNAL MEDICINE

## 2019-08-27 PROCEDURE — 99214 OFFICE O/P EST MOD 30 MIN: CPT | Performed by: INTERNAL MEDICINE

## 2019-08-27 PROCEDURE — G8926 SPIRO NO PERF OR DOC: HCPCS | Performed by: INTERNAL MEDICINE

## 2019-08-27 PROCEDURE — 1123F ACP DISCUSS/DSCN MKR DOCD: CPT | Performed by: INTERNAL MEDICINE

## 2019-08-27 PROCEDURE — 93000 ELECTROCARDIOGRAM COMPLETE: CPT | Performed by: INTERNAL MEDICINE

## 2019-08-27 PROCEDURE — G8417 CALC BMI ABV UP PARAM F/U: HCPCS | Performed by: INTERNAL MEDICINE

## 2019-08-27 PROCEDURE — 4040F PNEUMOC VAC/ADMIN/RCVD: CPT | Performed by: INTERNAL MEDICINE

## 2019-08-27 PROCEDURE — 1036F TOBACCO NON-USER: CPT | Performed by: INTERNAL MEDICINE

## 2019-08-27 PROCEDURE — G8427 DOCREV CUR MEDS BY ELIG CLIN: HCPCS | Performed by: INTERNAL MEDICINE

## 2019-08-27 RX ORDER — WARFARIN SODIUM 3 MG/1
TABLET ORAL
Refills: 5 | COMMUNITY
Start: 2019-08-05

## 2019-08-27 RX ORDER — WARFARIN SODIUM 2 MG/1
TABLET ORAL
Refills: 3 | COMMUNITY
Start: 2019-07-07

## 2019-08-27 NOTE — PROGRESS NOTES
OUTPATIENT CARDIOLOGY FOLLOW-UP    Name: Jamia Zayas    Age: 66 y.o. Primary Care Physician: Tila Wang MD    Date of Service: 8/27/2019    Chief Complaint: Nonischemic cardiomyopathy, chronic systolic heart failure, permanent atrial fibrillation, chronic obstructive lung disease, hypertension, chronic kidney disease    Interim History: Since most recent evaluation, the patient remains stable from a cardiovascular standpoint denies interim decompensation of his cardiomyopathy with persistent symptoms of exertional dyspnea of a multifactorial nature and no clinical manifestations of volume overload. He denies arrhythmia related symptoms nor discharge of his implantable cardioverted defibrillator with appropriate device function noted at most recent remote interrogation and clinical follow-up. He has noted no symptoms of a focal neurologic origin or bleeding in the face of his chronic oral anticoagulation. Review of Systems: The remainder of a complete multisystem review including consitutional, central nervous, respiratory, circulatory, gastrointestinal, genitourinary, endocrinologic, hematologic, musculoskeletal and psychiatric are negative.     Past Medical History:  Past Medical History:   Diagnosis Date    Arrhythmia     Atrial fibrillation ([address]     CHF (congestive heart failure) (HCC)     CKD (chronic kidney disease)     Hyperlipidemia     Hypertension     Nonischemic cardiomyopathy ([facility] [address]     Thyroid disease        Past Surgical History:  Past Surgical History:   Procedure Laterality Date    CARDIAC DEFIBRILLATOR PLACEMENT  6/21/11    HERNIA REPAIR         Family History:  Family History   Problem Relation Age of Onset    Cancer Father    [facility] Cancer Sister        Social History:  Social History     Socioeconomic History    Marital status: Single     Spouse name: Not on file    Number of children: Not on file    Years of education: Not on file    Highest education level:

## 2019-09-03 RX ORDER — HYDRALAZINE HYDROCHLORIDE 25 MG/1
25 TABLET, FILM COATED ORAL 2 TIMES DAILY
Qty: 60 TABLET | Refills: 11 | Status: SHIPPED
Start: 2019-09-03 | End: 2021-01-01 | Stop reason: SDUPTHER

## 2019-09-04 RX ORDER — HYDRALAZINE HYDROCHLORIDE 25 MG/1
TABLET, FILM COATED ORAL
Qty: 60 TABLET | Refills: 10 | Status: SHIPPED
Start: 2019-09-04 | End: 2021-01-01 | Stop reason: ALTCHOICE

## 2019-11-20 ENCOUNTER — NURSE ONLY (OUTPATIENT)
Dept: NON INVASIVE DIAGNOSTICS | Age: 74
End: 2019-11-20
Payer: MEDICARE

## 2019-11-20 DIAGNOSIS — I48.21 PERMANENT ATRIAL FIBRILLATION (HCC): ICD-10-CM

## 2019-11-20 DIAGNOSIS — I42.8 CARDIOMYOPATHY, NONISCHEMIC (HCC): ICD-10-CM

## 2019-11-20 DIAGNOSIS — Z95.810 S/P ICD (INTERNAL CARDIAC DEFIBRILLATOR) PROCEDURE: Primary | ICD-10-CM

## 2019-11-20 PROCEDURE — 93296 REM INTERROG EVL PM/IDS: CPT | Performed by: INTERNAL MEDICINE

## 2019-11-20 PROCEDURE — 93295 DEV INTERROG REMOTE 1/2/MLT: CPT | Performed by: INTERNAL MEDICINE

## 2019-11-27 ENCOUNTER — TELEPHONE (OUTPATIENT)
Dept: NON INVASIVE DIAGNOSTICS | Age: 74
End: 2019-11-27

## 2020-01-01 ENCOUNTER — NURSE ONLY (OUTPATIENT)
Dept: NON INVASIVE DIAGNOSTICS | Age: 75
End: 2020-01-01
Payer: MEDICARE

## 2020-01-01 ENCOUNTER — HOSPITAL ENCOUNTER (OUTPATIENT)
Age: 75
Discharge: HOME | End: 2020-06-09
Payer: MEDICARE

## 2020-01-01 ENCOUNTER — HOSPITAL ENCOUNTER (OUTPATIENT)
Age: 75
Discharge: HOME | End: 2020-12-02
Payer: MEDICARE

## 2020-01-01 ENCOUNTER — TELEPHONE (OUTPATIENT)
Dept: CARDIOLOGY CLINIC | Age: 75
End: 2020-01-01

## 2020-01-01 LAB
PROSTATE SPECIFIC ANTIGEN: 18.92 NG/ML (ref 0–4)
PROSTATE SPECIFIC ANTIGEN: 27.01 NG/ML (ref 0–4)

## 2020-01-01 PROCEDURE — 93295 DEV INTERROG REMOTE 1/2/MLT: CPT | Performed by: INTERNAL MEDICINE

## 2020-01-01 PROCEDURE — 93296 REM INTERROG EVL PM/IDS: CPT | Performed by: INTERNAL MEDICINE

## 2020-01-01 PROCEDURE — 36415 COLL VENOUS BLD VENIPUNCTURE: CPT

## 2020-01-01 PROCEDURE — 84153 ASSAY OF PSA TOTAL: CPT

## 2020-02-05 RX ORDER — SPIRONOLACTONE 25 MG/1
TABLET ORAL
Qty: 30 TABLET | Refills: 0 | Status: SHIPPED
Start: 2020-02-05 | End: 2020-03-03 | Stop reason: SDUPTHER

## 2020-02-19 ENCOUNTER — NURSE ONLY (OUTPATIENT)
Dept: NON INVASIVE DIAGNOSTICS | Age: 75
End: 2020-02-19
Payer: MEDICARE

## 2020-02-19 PROCEDURE — 93295 DEV INTERROG REMOTE 1/2/MLT: CPT | Performed by: INTERNAL MEDICINE

## 2020-02-19 PROCEDURE — 93296 REM INTERROG EVL PM/IDS: CPT | Performed by: INTERNAL MEDICINE

## 2020-03-03 RX ORDER — SPIRONOLACTONE 25 MG/1
TABLET ORAL
Qty: 30 TABLET | Refills: 5 | Status: SHIPPED
Start: 2020-03-03 | End: 2021-01-01 | Stop reason: ALTCHOICE

## 2020-03-25 ENCOUNTER — TELEPHONE (OUTPATIENT)
Dept: CARDIOLOGY CLINIC | Age: 75
End: 2020-03-25

## 2020-03-27 NOTE — TELEPHONE ENCOUNTER
----- Message from Jean-Claude Andres MD sent at 3/27/2020  9:26 AM EDT -----  Letter and laboratory studies reviewed suggest continuing medications as previously administered. Called patient re:   Above.   Lars Avila

## 2020-08-21 NOTE — PROGRESS NOTES
See [facility] [place] report. Remote monitoring reviewed over a 90 day period. End of 90 day monitoring period date of service 8.19.2020.

## 2021-01-01 ENCOUNTER — APPOINTMENT (OUTPATIENT)
Dept: GENERAL RADIOLOGY | Age: 76
DRG: 208 | End: 2021-01-01
Payer: MEDICARE

## 2021-01-01 ENCOUNTER — TELEPHONE (OUTPATIENT)
Dept: CARDIOLOGY CLINIC | Age: 76
End: 2021-01-01

## 2021-01-01 ENCOUNTER — ANESTHESIA EVENT (OUTPATIENT)
Dept: CARDIAC CATH/INVASIVE PROCEDURES | Age: 76
DRG: 208 | End: 2021-01-01
Payer: MEDICARE

## 2021-01-01 ENCOUNTER — HOSPITAL ENCOUNTER (OUTPATIENT)
Age: 76
Discharge: HOME | End: 2021-02-22
Payer: MEDICARE

## 2021-01-01 ENCOUNTER — ANESTHESIA (OUTPATIENT)
Dept: CARDIAC CATH/INVASIVE PROCEDURES | Age: 76
DRG: 208 | End: 2021-01-01
Payer: MEDICARE

## 2021-01-01 ENCOUNTER — NURSE ONLY (OUTPATIENT)
Dept: NON INVASIVE DIAGNOSTICS | Age: 76
End: 2021-01-01
Payer: MEDICARE

## 2021-01-01 ENCOUNTER — APPOINTMENT (OUTPATIENT)
Dept: CT IMAGING | Age: 76
DRG: 208 | End: 2021-01-01
Payer: MEDICARE

## 2021-01-01 ENCOUNTER — APPOINTMENT (OUTPATIENT)
Dept: ULTRASOUND IMAGING | Age: 76
DRG: 208 | End: 2021-01-01
Payer: MEDICARE

## 2021-01-01 ENCOUNTER — APPOINTMENT (OUTPATIENT)
Dept: CARDIAC CATH/INVASIVE PROCEDURES | Age: 76
DRG: 208 | End: 2021-01-01
Payer: MEDICARE

## 2021-01-01 ENCOUNTER — HOSPITAL ENCOUNTER (INPATIENT)
Age: 76
LOS: 8 days | DRG: 208 | End: 2021-03-27
Attending: EMERGENCY MEDICINE | Admitting: INTERNAL MEDICINE
Payer: MEDICARE

## 2021-01-01 ENCOUNTER — OFFICE VISIT (OUTPATIENT)
Dept: CARDIOLOGY CLINIC | Age: 76
End: 2021-01-01
Payer: MEDICARE

## 2021-01-01 ENCOUNTER — APPOINTMENT (OUTPATIENT)
Dept: NUCLEAR MEDICINE | Age: 76
DRG: 208 | End: 2021-01-01
Payer: MEDICARE

## 2021-01-01 VITALS
HEIGHT: 73 IN | WEIGHT: 214 LBS | BODY MASS INDEX: 28.36 KG/M2 | RESPIRATION RATE: 20 BRPM | DIASTOLIC BLOOD PRESSURE: 60 MMHG | HEART RATE: 105 BPM | SYSTOLIC BLOOD PRESSURE: 110 MMHG | OXYGEN SATURATION: 98 %

## 2021-01-01 VITALS
BODY MASS INDEX: 35.27 KG/M2 | RESPIRATION RATE: 18 BRPM | OXYGEN SATURATION: 68 % | WEIGHT: 266.1 LBS | HEART RATE: 98 BPM | SYSTOLIC BLOOD PRESSURE: 98 MMHG | TEMPERATURE: 99.7 F | DIASTOLIC BLOOD PRESSURE: 58 MMHG | HEIGHT: 73 IN

## 2021-01-01 DIAGNOSIS — I48.21 PERMANENT ATRIAL FIBRILLATION (HCC): ICD-10-CM

## 2021-01-01 DIAGNOSIS — I50.22 CHRONIC SYSTOLIC HEART FAILURE (HCC): ICD-10-CM

## 2021-01-01 DIAGNOSIS — E78.00 PURE HYPERCHOLESTEROLEMIA: ICD-10-CM

## 2021-01-01 DIAGNOSIS — I42.8 CARDIOMYOPATHY, NONISCHEMIC (HCC): ICD-10-CM

## 2021-01-01 DIAGNOSIS — I10 ESSENTIAL HYPERTENSION: ICD-10-CM

## 2021-01-01 DIAGNOSIS — I42.8 NONISCHEMIC CARDIOMYOPATHY (HCC): Primary | ICD-10-CM

## 2021-01-01 DIAGNOSIS — J96.01 ACUTE RESPIRATORY FAILURE WITH HYPOXIA (HCC): ICD-10-CM

## 2021-01-01 DIAGNOSIS — N18.32 STAGE 3B CHRONIC KIDNEY DISEASE (HCC): ICD-10-CM

## 2021-01-01 DIAGNOSIS — I50.9 ACUTE ON CHRONIC CONGESTIVE HEART FAILURE, UNSPECIFIED HEART FAILURE TYPE (HCC): Primary | ICD-10-CM

## 2021-01-01 DIAGNOSIS — I42.8 NONISCHEMIC CARDIOMYOPATHY (HCC): ICD-10-CM

## 2021-01-01 DIAGNOSIS — J44.9 CHRONIC OBSTRUCTIVE PULMONARY DISEASE, UNSPECIFIED COPD TYPE (HCC): ICD-10-CM

## 2021-01-01 DIAGNOSIS — Z95.810 S/P ICD (INTERNAL CARDIAC DEFIBRILLATOR) PROCEDURE: Primary | ICD-10-CM

## 2021-01-01 LAB
AADO2: 411.6 MMHG
AADO2: 420.4 MMHG
AADO2: 430 MMHG
AADO2: 470.1 MMHG
AADO2: 525.5 MMHG
AADO2: 526.9 MMHG
AADO2: 532.9 MMHG
AADO2: 562.1 MMHG
AADO2: 572 MMHG
AADO2: 576.8 MMHG
AADO2: 577.8 MMHG
AADO2: 577.8 MMHG
AADO2: 581.9 MMHG
AADO2: 596.1 MMHG
ADENOVIRUS BY PCR: NOT DETECTED
ADENOVIRUS BY PCR: NOT DETECTED
ALBUMIN SERPL-MCNC: 2.9 G/DL (ref 3.5–5.2)
ALBUMIN SERPL-MCNC: 3 G/DL (ref 3.5–5.2)
ALBUMIN SERPL-MCNC: 3.1 G/DL (ref 3.5–5.2)
ALBUMIN SERPL-MCNC: 3.2 G/DL (ref 3.5–5.2)
ALBUMIN SERPL-MCNC: 3.4 G/DL (ref 3.5–5.2)
ALBUMIN SERPL-MCNC: 3.6 G/DL (ref 3.5–5.2)
ALP BLD-CCNC: 29 U/L (ref 40–129)
ALP BLD-CCNC: 29 U/L (ref 40–129)
ALP BLD-CCNC: 30 U/L (ref 40–129)
ALP BLD-CCNC: 30 U/L (ref 40–129)
ALP BLD-CCNC: 42 U/L (ref 40–129)
ALP BLD-CCNC: 52 U/L (ref 40–129)
ALP BLD-CCNC: 58 U/L (ref 40–129)
ALP BLD-CCNC: 63 U/L (ref 40–129)
ALT SERPL-CCNC: 15 U/L (ref 0–40)
ALT SERPL-CCNC: 15 U/L (ref 0–40)
ALT SERPL-CCNC: 238 U/L (ref 0–40)
ALT SERPL-CCNC: 25 U/L (ref 0–40)
ALT SERPL-CCNC: 251 U/L (ref 0–40)
ALT SERPL-CCNC: 259 U/L (ref 0–40)
ALT SERPL-CCNC: 260 U/L (ref 0–40)
ALT SERPL-CCNC: 320 U/L (ref 0–40)
ANION GAP SERPL CALCULATED.3IONS-SCNC: 10 MMOL/L (ref 7–16)
ANION GAP SERPL CALCULATED.3IONS-SCNC: 10 MMOL/L (ref 7–16)
ANION GAP SERPL CALCULATED.3IONS-SCNC: 11 MMOL/L (ref 7–16)
ANION GAP SERPL CALCULATED.3IONS-SCNC: 11 MMOL/L (ref 7–16)
ANION GAP SERPL CALCULATED.3IONS-SCNC: 12 MMOL/L (ref 7–16)
ANION GAP SERPL CALCULATED.3IONS-SCNC: 13 MMOL/L (ref 7–16)
ANION GAP SERPL CALCULATED.3IONS-SCNC: 14 MMOL/L (ref 7–16)
ANION GAP SERPL CALCULATED.3IONS-SCNC: 14 MMOL/L (ref 7–16)
ANION GAP SERPL CALCULATED.3IONS-SCNC: 15 MMOL/L (ref 7–16)
ANION GAP SERPL CALCULATED.3IONS-SCNC: 16 MMOL/L (ref 7–16)
ANION GAP SERPL CALCULATED.3IONS-SCNC: 18 MMOL/L (ref 7–16)
ANION GAP SERPL CALCULATED.3IONS-SCNC: 7 MMOL/L (ref 7–16)
ANION GAP SERPL CALCULATED.3IONS-SCNC: 9 MMOL/L (ref 7–16)
ANION GAP SERPL CALCULATED.3IONS-SCNC: 9 MMOL/L (ref 7–16)
ANISOCYTOSIS: ABNORMAL
APPEARANCE FLUID: NORMAL
APPEARANCE FLUID: NORMAL
APTT: 42.3 SEC (ref 24.5–35.1)
APTT: 43.8 SEC (ref 24.5–35.1)
APTT: 44.8 SEC (ref 24.5–35.1)
APTT: 55.4 SEC (ref 24.5–35.1)
APTT: 66.4 SEC (ref 24.5–35.1)
APTT: 67.2 SEC (ref 24.5–35.1)
APTT: 79.1 SEC (ref 24.5–35.1)
APTT: 90.2 SEC (ref 24.5–35.1)
APTT: 96.5 SEC (ref 24.5–35.1)
AST SERPL-CCNC: 190 U/L (ref 0–39)
AST SERPL-CCNC: 195 U/L (ref 0–39)
AST SERPL-CCNC: 21 U/L (ref 0–39)
AST SERPL-CCNC: 23 U/L (ref 0–39)
AST SERPL-CCNC: 230 U/L (ref 0–39)
AST SERPL-CCNC: 239 U/L (ref 0–39)
AST SERPL-CCNC: 25 U/L (ref 0–39)
AST SERPL-CCNC: 409 U/L (ref 0–39)
B.E.: -0.8 MMOL/L (ref -3–3)
B.E.: -0.9 MMOL/L (ref -3–3)
B.E.: -1.2 MMOL/L (ref -3–3)
B.E.: -1.3 MMOL/L (ref -3–3)
B.E.: -1.3 MMOL/L (ref -3–3)
B.E.: -1.4 MMOL/L (ref -3–3)
B.E.: -1.7 MMOL/L (ref -3–3)
B.E.: -16 MMOL/L (ref -3–3)
B.E.: -16.1 MMOL/L (ref -3–3)
B.E.: -2.4 MMOL/L (ref -3–3)
B.E.: -3.4 MMOL/L (ref -3–3)
B.E.: -3.9 MMOL/L (ref -3–3)
B.E.: -4.1 MMOL/L (ref -3–3)
B.E.: -4.3 MMOL/L (ref -3–3)
B.E.: 0.5 MMOL/L (ref -3–3)
B.E.: 1 MMOL/L (ref -3–3)
BACTERIA: ABNORMAL /HPF
BACTERIA: ABNORMAL /HPF
BASOPHILIC STIPPLING: ABNORMAL
BASOPHILS ABSOLUTE: 0 E9/L (ref 0–0.2)
BASOPHILS ABSOLUTE: 0.02 E9/L (ref 0–0.2)
BASOPHILS ABSOLUTE: 0.03 E9/L (ref 0–0.2)
BASOPHILS ABSOLUTE: 0.03 E9/L (ref 0–0.2)
BASOPHILS RELATIVE PERCENT: 0.1 % (ref 0–2)
BASOPHILS RELATIVE PERCENT: 0.2 % (ref 0–2)
BASOPHILS RELATIVE PERCENT: 0.3 % (ref 0–2)
BASOPHILS RELATIVE PERCENT: 0.6 % (ref 0–2)
BASOPHILS RELATIVE PERCENT: 0.6 % (ref 0–2)
BILIRUB SERPL-MCNC: 0.6 MG/DL (ref 0–1.2)
BILIRUB SERPL-MCNC: 0.9 MG/DL (ref 0–1.2)
BILIRUB SERPL-MCNC: 0.9 MG/DL (ref 0–1.2)
BILIRUB SERPL-MCNC: 4.1 MG/DL (ref 0–1.2)
BILIRUB SERPL-MCNC: 4.2 MG/DL (ref 0–1.2)
BILIRUB SERPL-MCNC: 4.3 MG/DL (ref 0–1.2)
BILIRUB SERPL-MCNC: 4.5 MG/DL (ref 0–1.2)
BILIRUB SERPL-MCNC: 4.8 MG/DL (ref 0–1.2)
BILIRUBIN DIRECT: 0.5 MG/DL (ref 0–0.3)
BILIRUBIN DIRECT: 3.1 MG/DL (ref 0–0.3)
BILIRUBIN DIRECT: 3.1 MG/DL (ref 0–0.3)
BILIRUBIN DIRECT: 3.2 MG/DL (ref 0–0.3)
BILIRUBIN URINE: NEGATIVE
BILIRUBIN URINE: NEGATIVE
BILIRUBIN, INDIRECT: 0.4 MG/DL (ref 0–1)
BILIRUBIN, INDIRECT: 1 MG/DL (ref 0–1)
BILIRUBIN, INDIRECT: 1.1 MG/DL (ref 0–1)
BILIRUBIN, INDIRECT: 1.1 MG/DL (ref 0–1)
BLOOD CULTURE, ROUTINE: NORMAL
BLOOD, URINE: ABNORMAL
BLOOD, URINE: ABNORMAL
BORDETELLA PARAPERTUSSIS BY PCR: NOT DETECTED
BORDETELLA PARAPERTUSSIS BY PCR: NOT DETECTED
BORDETELLA PERTUSSIS BY PCR: NOT DETECTED
BORDETELLA PERTUSSIS BY PCR: NOT DETECTED
BUN BLDV-MCNC: 30 MG/DL (ref 8–23)
BUN BLDV-MCNC: 33 MG/DL (ref 8–23)
BUN BLDV-MCNC: 39 MG/DL (ref 8–23)
BUN BLDV-MCNC: 44 MG/DL (ref 8–23)
BUN BLDV-MCNC: 45 MG/DL (ref 8–23)
BUN BLDV-MCNC: 48 MG/DL (ref 8–23)
BUN BLDV-MCNC: 52 MG/DL (ref 8–23)
BUN BLDV-MCNC: 53 MG/DL (ref 8–23)
BUN BLDV-MCNC: 53 MG/DL (ref 8–23)
BUN BLDV-MCNC: 56 MG/DL (ref 8–23)
BUN BLDV-MCNC: 56 MG/DL (ref 8–23)
BUN BLDV-MCNC: 57 MG/DL (ref 8–23)
BUN BLDV-MCNC: 61 MG/DL (ref 8–23)
BUN BLDV-MCNC: 61 MG/DL (ref 8–23)
BUN BLDV-MCNC: 64 MG/DL (ref 8–23)
BUN BLDV-MCNC: 71 MG/DL (ref 8–23)
BUN BLDV-MCNC: 71 MG/DL (ref 8–23)
BUN BLDV-MCNC: 72 MG/DL (ref 8–23)
BUN BLDV-MCNC: 72 MG/DL (ref 8–23)
BUN BLDV-MCNC: 73 MG/DL (ref 8–23)
BUN BLDV-MCNC: 77 MG/DL (ref 8–23)
BUN BLDV-MCNC: 78 MG/DL (ref 8–23)
BUN BLDV-MCNC: 80 MG/DL (ref 8–23)
BUN BLDV-MCNC: 85 MG/DL (ref 8–23)
BUN BLDV-MCNC: 87 MG/DL (ref 8–23)
BURR CELLS: ABNORMAL
C-REACTIVE PROTEIN: 2.7 MG/DL (ref 0–0.4)
C-REACTIVE PROTEIN: 20.1 MG/DL (ref 0–0.4)
CALCIUM IONIZED: 1.16 MMOL/L (ref 1.15–1.33)
CALCIUM SERPL-MCNC: 8.7 MG/DL (ref 8.6–10.2)
CALCIUM SERPL-MCNC: 9 MG/DL (ref 8.6–10.2)
CALCIUM SERPL-MCNC: 9.1 MG/DL (ref 8.6–10.2)
CALCIUM SERPL-MCNC: 9.1 MG/DL (ref 8.6–10.2)
CALCIUM SERPL-MCNC: 9.2 MG/DL (ref 8.6–10.2)
CALCIUM SERPL-MCNC: 9.3 MG/DL (ref 8.6–10.2)
CALCIUM SERPL-MCNC: 9.4 MG/DL (ref 8.6–10.2)
CALCIUM SERPL-MCNC: 9.5 MG/DL (ref 8.6–10.2)
CALCIUM SERPL-MCNC: 9.6 MG/DL (ref 8.6–10.2)
CALCIUM SERPL-MCNC: 9.7 MG/DL (ref 8.6–10.2)
CALCIUM SERPL-MCNC: 9.8 MG/DL (ref 8.6–10.2)
CELL COUNT FLUID TYPE: NORMAL
CELL COUNT FLUID TYPE: NORMAL
CHLAMYDOPHILIA PNEUMONIAE BY PCR: NOT DETECTED
CHLAMYDOPHILIA PNEUMONIAE BY PCR: NOT DETECTED
CHLORIDE BLD-SCNC: 100 MMOL/L (ref 98–107)
CHLORIDE BLD-SCNC: 101 MMOL/L (ref 98–107)
CHLORIDE BLD-SCNC: 101 MMOL/L (ref 98–107)
CHLORIDE BLD-SCNC: 102 MMOL/L (ref 98–107)
CHLORIDE BLD-SCNC: 102 MMOL/L (ref 98–107)
CHLORIDE BLD-SCNC: 103 MMOL/L (ref 98–107)
CHLORIDE BLD-SCNC: 104 MMOL/L (ref 98–107)
CHLORIDE BLD-SCNC: 105 MMOL/L (ref 98–107)
CHLORIDE BLD-SCNC: 105 MMOL/L (ref 98–107)
CHLORIDE BLD-SCNC: 106 MMOL/L (ref 98–107)
CHLORIDE BLD-SCNC: 92 MMOL/L (ref 98–107)
CHLORIDE BLD-SCNC: 96 MMOL/L (ref 98–107)
CHLORIDE BLD-SCNC: 96 MMOL/L (ref 98–107)
CHLORIDE BLD-SCNC: 98 MMOL/L (ref 98–107)
CHLORIDE BLD-SCNC: 99 MMOL/L (ref 98–107)
CHLORIDE URINE RANDOM: 21 MMOL/L
CHLORIDE URINE RANDOM: 63 MMOL/L
CHOLESTEROL, TOTAL: 99 MG/DL (ref 0–199)
CK MB: 4.2 NG/ML (ref 0–7.7)
CLARITY: CLEAR
CLARITY: CLEAR
CO2: 19 MMOL/L (ref 22–29)
CO2: 20 MMOL/L (ref 22–29)
CO2: 20 MMOL/L (ref 22–29)
CO2: 21 MMOL/L (ref 22–29)
CO2: 22 MMOL/L (ref 22–29)
CO2: 23 MMOL/L (ref 22–29)
CO2: 24 MMOL/L (ref 22–29)
CO2: 26 MMOL/L (ref 22–29)
CO2: 26 MMOL/L (ref 22–29)
CO2: 27 MMOL/L (ref 22–29)
CO2: 29 MMOL/L (ref 22–29)
CO2: 30 MMOL/L (ref 22–29)
COARSE CASTS, UA: ABNORMAL /LPF (ref 0–2)
COHB: 0.5 % (ref 0–1.5)
COHB: 0.6 % (ref 0–1.5)
COHB: 0.6 % (ref 0–1.5)
COHB: 0.7 % (ref 0–1.5)
COHB: 1 % (ref 0–1.5)
COHB: 1 % (ref 0–1.5)
COHB: 1.1 % (ref 0–1.5)
COHB: 1.2 % (ref 0–1.5)
COHB: 1.3 % (ref 0–1.5)
COHB: 1.4 % (ref 0–1.5)
COHB: 1.6 % (ref 0–1.5)
COHB: 1.6 % (ref 0–1.5)
COLOR FLUID: YELLOW
COLOR FLUID: YELLOW
COLOR: ABNORMAL
COLOR: YELLOW
CORONAVIRUS 229E BY PCR: NOT DETECTED
CORONAVIRUS 229E BY PCR: NOT DETECTED
CORONAVIRUS HKU1 BY PCR: NOT DETECTED
CORONAVIRUS HKU1 BY PCR: NOT DETECTED
CORONAVIRUS NL63 BY PCR: NOT DETECTED
CORONAVIRUS NL63 BY PCR: NOT DETECTED
CORONAVIRUS OC43 BY PCR: NOT DETECTED
CORONAVIRUS OC43 BY PCR: NOT DETECTED
CORTISOL TOTAL: 30.2 MCG/DL (ref 2.68–18.4)
CREAT SERPL-MCNC: 1.7 MG/DL (ref 0.7–1.2)
CREAT SERPL-MCNC: 1.8 MG/DL (ref 0.7–1.2)
CREAT SERPL-MCNC: 2.1 MG/DL (ref 0.7–1.2)
CREAT SERPL-MCNC: 2.2 MG/DL (ref 0.7–1.2)
CREAT SERPL-MCNC: 2.3 MG/DL (ref 0.7–1.2)
CREAT SERPL-MCNC: 2.4 MG/DL (ref 0.7–1.2)
CREAT SERPL-MCNC: 2.4 MG/DL (ref 0.7–1.2)
CREAT SERPL-MCNC: 2.5 MG/DL (ref 0.7–1.2)
CREAT SERPL-MCNC: 2.5 MG/DL (ref 0.7–1.2)
CREAT SERPL-MCNC: 2.6 MG/DL (ref 0.7–1.2)
CREAT SERPL-MCNC: 2.9 MG/DL (ref 0.7–1.2)
CREAT SERPL-MCNC: 2.9 MG/DL (ref 0.7–1.2)
CREAT SERPL-MCNC: 3 MG/DL (ref 0.7–1.2)
CREATININE URINE: 86 MG/DL (ref 40–278)
CREATININE URINE: 94 MG/DL (ref 40–278)
CRITICAL: ABNORMAL
CRITICAL: NORMAL
CULTURE, BLOOD 2: NORMAL
CULTURE, RESPIRATORY: ABNORMAL
CULTURE, RESPIRATORY: ABNORMAL
CULTURE, RESPIRATORY: NORMAL
D DIMER: 2256 NG/ML DDU
D DIMER: 287 NG/ML DDU
D DIMER: 803 NG/ML DDU
DATE ANALYZED: ABNORMAL
DATE ANALYZED: NORMAL
DATE OF COLLECTION: ABNORMAL
DATE OF COLLECTION: NORMAL
DIGOXIN LEVEL: 1 NG/ML (ref 0.8–2)
DIGOXIN LEVEL: 1.7 NG/ML (ref 0.8–2)
EKG ATRIAL RATE: 104 BPM
EKG ATRIAL RATE: 117 BPM
EKG ATRIAL RATE: 122 BPM
EKG ATRIAL RATE: 132 BPM
EKG ATRIAL RATE: 163 BPM
EKG P AXIS: 31 DEGREES
EKG P-R INTERVAL: 184 MS
EKG Q-T INTERVAL: 276 MS
EKG Q-T INTERVAL: 304 MS
EKG Q-T INTERVAL: 318 MS
EKG Q-T INTERVAL: 320 MS
EKG Q-T INTERVAL: 352 MS
EKG QRS DURATION: 84 MS
EKG QRS DURATION: 86 MS
EKG QRS DURATION: 90 MS
EKG QRS DURATION: 94 MS
EKG QRS DURATION: 94 MS
EKG QTC CALCULATION (BAZETT): 385 MS
EKG QTC CALCULATION (BAZETT): 433 MS
EKG QTC CALCULATION (BAZETT): 437 MS
EKG QTC CALCULATION (BAZETT): 470 MS
EKG QTC CALCULATION (BAZETT): 471 MS
EKG R AXIS: -130 DEGREES
EKG R AXIS: -140 DEGREES
EKG R AXIS: -171 DEGREES
EKG R AXIS: 124 DEGREES
EKG R AXIS: 172 DEGREES
EKG T AXIS: 116 DEGREES
EKG T AXIS: 39 DEGREES
EKG T AXIS: 46 DEGREES
EKG T AXIS: 75 DEGREES
EKG T AXIS: 99 DEGREES
EKG VENTRICULAR RATE: 117 BPM
EKG VENTRICULAR RATE: 122 BPM
EKG VENTRICULAR RATE: 130 BPM
EKG VENTRICULAR RATE: 132 BPM
EKG VENTRICULAR RATE: 93 BPM
EOSINOPHILS ABSOLUTE: 0 E9/L (ref 0.05–0.5)
EOSINOPHILS ABSOLUTE: 0.01 E9/L (ref 0.05–0.5)
EOSINOPHILS ABSOLUTE: 0.02 E9/L (ref 0.05–0.5)
EOSINOPHILS ABSOLUTE: 0.04 E9/L (ref 0.05–0.5)
EOSINOPHILS ABSOLUTE: 0.1 E9/L (ref 0.05–0.5)
EOSINOPHILS RELATIVE PERCENT: 0 % (ref 0–6)
EOSINOPHILS RELATIVE PERCENT: 0.1 % (ref 0–6)
EOSINOPHILS RELATIVE PERCENT: 0.2 % (ref 0–6)
EOSINOPHILS RELATIVE PERCENT: 0.8 % (ref 0–6)
EOSINOPHILS RELATIVE PERCENT: 0.9 % (ref 0–6)
FIBRINOGEN: 499 MG/DL (ref 225–540)
FIBRINOGEN: 555 MG/DL (ref 225–540)
FIO2: 100 %
FIO2: 75 %
FIO2: 80 %
FIO2: 80 %
FIO2: 85 %
FIO2: 95 %
FLUID TYPE: NORMAL
GFR AFRICAN AMERICAN: 25
GFR AFRICAN AMERICAN: 26
GFR AFRICAN AMERICAN: 26
GFR AFRICAN AMERICAN: 29
GFR AFRICAN AMERICAN: 30
GFR AFRICAN AMERICAN: 30
GFR AFRICAN AMERICAN: 32
GFR AFRICAN AMERICAN: 32
GFR AFRICAN AMERICAN: 33
GFR AFRICAN AMERICAN: 35
GFR AFRICAN AMERICAN: 37
GFR AFRICAN AMERICAN: 44
GFR AFRICAN AMERICAN: 47
GFR NON-AFRICAN AMERICAN: 25 ML/MIN/1.73
GFR NON-AFRICAN AMERICAN: 26 ML/MIN/1.73
GFR NON-AFRICAN AMERICAN: 26 ML/MIN/1.73
GFR NON-AFRICAN AMERICAN: 29 ML/MIN/1.73
GFR NON-AFRICAN AMERICAN: 30 ML/MIN/1.73
GFR NON-AFRICAN AMERICAN: 30 ML/MIN/1.73
GFR NON-AFRICAN AMERICAN: 32 ML/MIN/1.73
GFR NON-AFRICAN AMERICAN: 32 ML/MIN/1.73
GFR NON-AFRICAN AMERICAN: 33 ML/MIN/1.73
GFR NON-AFRICAN AMERICAN: 35 ML/MIN/1.73
GFR NON-AFRICAN AMERICAN: 37 ML/MIN/1.73
GFR NON-AFRICAN AMERICAN: 44 ML/MIN/1.73
GFR NON-AFRICAN AMERICAN: 47 ML/MIN/1.73
GLUCOSE BLD-MCNC: 102 MG/DL (ref 74–99)
GLUCOSE BLD-MCNC: 105 MG/DL (ref 74–99)
GLUCOSE BLD-MCNC: 108 MG/DL (ref 74–99)
GLUCOSE BLD-MCNC: 117 MG/DL (ref 74–99)
GLUCOSE BLD-MCNC: 117 MG/DL (ref 74–99)
GLUCOSE BLD-MCNC: 144 MG/DL (ref 74–99)
GLUCOSE BLD-MCNC: 151 MG/DL (ref 74–99)
GLUCOSE BLD-MCNC: 175 MG/DL (ref 74–99)
GLUCOSE BLD-MCNC: 175 MG/DL (ref 74–99)
GLUCOSE BLD-MCNC: 179 MG/DL (ref 74–99)
GLUCOSE BLD-MCNC: 181 MG/DL (ref 74–99)
GLUCOSE BLD-MCNC: 185 MG/DL (ref 74–99)
GLUCOSE BLD-MCNC: 190 MG/DL (ref 74–99)
GLUCOSE BLD-MCNC: 190 MG/DL (ref 74–99)
GLUCOSE BLD-MCNC: 255 MG/DL (ref 74–99)
GLUCOSE BLD-MCNC: 61 MG/DL (ref 74–99)
GLUCOSE BLD-MCNC: 67 MG/DL (ref 74–99)
GLUCOSE BLD-MCNC: 81 MG/DL (ref 74–99)
GLUCOSE BLD-MCNC: 81 MG/DL (ref 74–99)
GLUCOSE BLD-MCNC: 83 MG/DL (ref 74–99)
GLUCOSE BLD-MCNC: 87 MG/DL (ref 74–99)
GLUCOSE BLD-MCNC: 90 MG/DL (ref 74–99)
GLUCOSE BLD-MCNC: 91 MG/DL (ref 74–99)
GLUCOSE URINE: NEGATIVE MG/DL
GLUCOSE URINE: NEGATIVE MG/DL
GLUCOSE, FLUID: 203 MG/DL
GRAM STAIN ORDERABLE: NORMAL
GRAM STAIN ORDERABLE: NORMAL
HCO3: 15 MMOL/L (ref 22–26)
HCO3: 15.3 MMOL/L (ref 22–26)
HCO3: 21.1 MMOL/L (ref 22–26)
HCO3: 21.4 MMOL/L (ref 22–26)
HCO3: 21.7 MMOL/L (ref 22–26)
HCO3: 22.6 MMOL/L (ref 22–26)
HCO3: 23.1 MMOL/L (ref 22–26)
HCO3: 23.5 MMOL/L (ref 22–26)
HCO3: 23.6 MMOL/L (ref 22–26)
HCO3: 23.6 MMOL/L (ref 22–26)
HCO3: 24.4 MMOL/L (ref 22–26)
HCO3: 24.7 MMOL/L (ref 22–26)
HCO3: 25.4 MMOL/L (ref 22–26)
HCO3: 26.1 MMOL/L (ref 22–26)
HCO3: 26.4 MMOL/L (ref 22–26)
HCO3: 28.4 MMOL/L (ref 22–26)
HCT VFR BLD CALC: 39.4 % (ref 37–54)
HCT VFR BLD CALC: 39.6 % (ref 37–54)
HCT VFR BLD CALC: 41.2 % (ref 37–54)
HCT VFR BLD CALC: 42.3 % (ref 37–54)
HCT VFR BLD CALC: 43.2 % (ref 37–54)
HCT VFR BLD CALC: 44 % (ref 37–54)
HCT VFR BLD CALC: 44.7 % (ref 37–54)
HCT VFR BLD CALC: 45 % (ref 37–54)
HCT VFR BLD CALC: 45 % (ref 37–54)
HCT VFR BLD CALC: 45.1 % (ref 37–54)
HCT VFR BLD CALC: 45.3 % (ref 37–54)
HCT VFR BLD CALC: 45.4 % (ref 37–54)
HCT VFR BLD CALC: 45.6 % (ref 37–54)
HCT VFR BLD CALC: 45.8 % (ref 37–54)
HCT VFR BLD CALC: 45.9 % (ref 37–54)
HCT VFR BLD CALC: 46.7 % (ref 37–54)
HCT VFR BLD CALC: 47.8 % (ref 37–54)
HDLC SERPL-MCNC: 39 MG/DL
HEMOGLOBIN: 12.4 G/DL (ref 12.5–16.5)
HEMOGLOBIN: 12.9 G/DL (ref 12.5–16.5)
HEMOGLOBIN: 13.1 G/DL (ref 12.5–16.5)
HEMOGLOBIN: 13.8 G/DL (ref 12.5–16.5)
HEMOGLOBIN: 14.1 G/DL (ref 12.5–16.5)
HEMOGLOBIN: 14.1 G/DL (ref 12.5–16.5)
HEMOGLOBIN: 14.2 G/DL (ref 12.5–16.5)
HEMOGLOBIN: 14.2 G/DL (ref 12.5–16.5)
HEMOGLOBIN: 14.3 G/DL (ref 12.5–16.5)
HEMOGLOBIN: 14.3 G/DL (ref 12.5–16.5)
HEMOGLOBIN: 14.4 G/DL (ref 12.5–16.5)
HEMOGLOBIN: 14.5 G/DL (ref 12.5–16.5)
HEMOGLOBIN: 14.5 G/DL (ref 12.5–16.5)
HEMOGLOBIN: 14.6 G/DL (ref 12.5–16.5)
HEMOGLOBIN: 14.9 G/DL (ref 12.5–16.5)
HHB: 10.8 % (ref 0–5)
HHB: 19.1 % (ref 0–5)
HHB: 19.2 % (ref 0–5)
HHB: 19.9 % (ref 0–5)
HHB: 3 % (ref 0–5)
HHB: 3.3 % (ref 0–5)
HHB: 3.8 % (ref 0–5)
HHB: 35.2 % (ref 0–5)
HHB: 5.3 % (ref 0–5)
HHB: 5.3 % (ref 0–5)
HHB: 5.9 % (ref 0–5)
HHB: 6.2 % (ref 0–5)
HHB: 6.5 % (ref 0–5)
HHB: 7.5 % (ref 0–5)
HHB: 7.8 % (ref 0–5)
HHB: 7.8 % (ref 0–5)
HOWELL-JOLLY BODIES: ABNORMAL
HUMAN METAPNEUMOVIRUS BY PCR: NOT DETECTED
HUMAN METAPNEUMOVIRUS BY PCR: NOT DETECTED
HUMAN RHINOVIRUS/ENTEROVIRUS BY PCR: NOT DETECTED
HUMAN RHINOVIRUS/ENTEROVIRUS BY PCR: NOT DETECTED
HYALINE CASTS: ABNORMAL /LPF (ref 0–2)
IMMATURE GRANULOCYTES #: 0.03 E9/L
IMMATURE GRANULOCYTES #: 0.05 E9/L
IMMATURE GRANULOCYTES #: 0.18 E9/L
IMMATURE GRANULOCYTES %: 0.6 % (ref 0–5)
IMMATURE GRANULOCYTES %: 1 % (ref 0–5)
IMMATURE GRANULOCYTES %: 1.6 % (ref 0–5)
INFLUENZA A BY PCR: NOT DETECTED
INFLUENZA A BY PCR: NOT DETECTED
INFLUENZA B BY PCR: NOT DETECTED
INFLUENZA B BY PCR: NOT DETECTED
INR BLD: 1.9
INR BLD: 2
INR BLD: 2.1
INR BLD: 2.1
INR BLD: 2.6
INR BLD: 2.7
INR BLD: 2.9
INR BLD: 4.4
INR BLD: 7.1
KETONES, URINE: NEGATIVE MG/DL
KETONES, URINE: NEGATIVE MG/DL
L. PNEUMOPHILA SEROGP 1 UR AG: NORMAL
LAB: ABNORMAL
LAB: NORMAL
LACTATE DEHYDROGENASE: 403 U/L (ref 135–225)
LACTIC ACID: 1.5 MMOL/L (ref 0.5–2.2)
LD, FLUID: 116 U/L
LDL CHOLESTEROL CALCULATED: 50 MG/DL (ref 0–99)
LEUKOCYTE ESTERASE, URINE: ABNORMAL
LEUKOCYTE ESTERASE, URINE: NEGATIVE
LIPASE: 18 U/L (ref 13–60)
LV EF: 35 %
LVEF MODALITY: NORMAL
LYMPHOCYTES ABSOLUTE: 0.11 E9/L (ref 1.5–4)
LYMPHOCYTES ABSOLUTE: 0.12 E9/L (ref 1.5–4)
LYMPHOCYTES ABSOLUTE: 0.12 E9/L (ref 1.5–4)
LYMPHOCYTES ABSOLUTE: 0.13 E9/L (ref 1.5–4)
LYMPHOCYTES ABSOLUTE: 0.22 E9/L (ref 1.5–4)
LYMPHOCYTES ABSOLUTE: 0.33 E9/L (ref 1.5–4)
LYMPHOCYTES ABSOLUTE: 0.57 E9/L (ref 1.5–4)
LYMPHOCYTES ABSOLUTE: 0.75 E9/L (ref 1.5–4)
LYMPHOCYTES ABSOLUTE: 0.82 E9/L (ref 1.5–4)
LYMPHOCYTES RELATIVE PERCENT: 0.9 % (ref 20–42)
LYMPHOCYTES RELATIVE PERCENT: 1.8 % (ref 20–42)
LYMPHOCYTES RELATIVE PERCENT: 11.3 % (ref 20–42)
LYMPHOCYTES RELATIVE PERCENT: 15.6 % (ref 20–42)
LYMPHOCYTES RELATIVE PERCENT: 2.6 % (ref 20–42)
LYMPHOCYTES RELATIVE PERCENT: 6.8 % (ref 20–42)
LYMPHOCYTES, BODY FLUID: 66 %
Lab: ABNORMAL
Lab: NORMAL
MAGNESIUM: 2.1 MG/DL (ref 1.6–2.6)
MAGNESIUM: 2.2 MG/DL (ref 1.6–2.6)
MAGNESIUM: 2.3 MG/DL (ref 1.6–2.6)
MAGNESIUM: 2.4 MG/DL (ref 1.6–2.6)
MAGNESIUM: 2.6 MG/DL (ref 1.6–2.6)
MAGNESIUM: 2.8 MG/DL (ref 1.6–2.6)
MAGNESIUM: 3 MG/DL (ref 1.6–2.6)
MCH RBC QN AUTO: 30.1 PG (ref 26–35)
MCH RBC QN AUTO: 30.2 PG (ref 26–35)
MCH RBC QN AUTO: 30.3 PG (ref 26–35)
MCH RBC QN AUTO: 30.6 PG (ref 26–35)
MCH RBC QN AUTO: 30.6 PG (ref 26–35)
MCH RBC QN AUTO: 30.7 PG (ref 26–35)
MCH RBC QN AUTO: 31 PG (ref 26–35)
MCH RBC QN AUTO: 31.1 PG (ref 26–35)
MCH RBC QN AUTO: 31.3 PG (ref 26–35)
MCHC RBC AUTO-ENTMCNC: 30.1 % (ref 32–34.5)
MCHC RBC AUTO-ENTMCNC: 31.2 % (ref 32–34.5)
MCHC RBC AUTO-ENTMCNC: 31.3 % (ref 32–34.5)
MCHC RBC AUTO-ENTMCNC: 31.4 % (ref 32–34.5)
MCHC RBC AUTO-ENTMCNC: 31.5 % (ref 32–34.5)
MCHC RBC AUTO-ENTMCNC: 31.7 % (ref 32–34.5)
MCHC RBC AUTO-ENTMCNC: 32.6 % (ref 32–34.5)
MCHC RBC AUTO-ENTMCNC: 32.7 % (ref 32–34.5)
MCHC RBC AUTO-ENTMCNC: 32.9 % (ref 32–34.5)
MCHC RBC AUTO-ENTMCNC: 33 % (ref 32–34.5)
MCHC RBC AUTO-ENTMCNC: 33.1 % (ref 32–34.5)
MCV RBC AUTO: 100 FL (ref 80–99.9)
MCV RBC AUTO: 93.1 FL (ref 80–99.9)
MCV RBC AUTO: 93.8 FL (ref 80–99.9)
MCV RBC AUTO: 94.1 FL (ref 80–99.9)
MCV RBC AUTO: 94.8 FL (ref 80–99.9)
MCV RBC AUTO: 94.9 FL (ref 80–99.9)
MCV RBC AUTO: 96.1 FL (ref 80–99.9)
MCV RBC AUTO: 96.5 FL (ref 80–99.9)
MCV RBC AUTO: 97.7 FL (ref 80–99.9)
MCV RBC AUTO: 98 FL (ref 80–99.9)
MCV RBC AUTO: 99.4 FL (ref 80–99.9)
METER GLUCOSE: 102 MG/DL (ref 74–99)
METER GLUCOSE: 105 MG/DL (ref 74–99)
METER GLUCOSE: 111 MG/DL (ref 74–99)
METER GLUCOSE: 117 MG/DL (ref 74–99)
METER GLUCOSE: 119 MG/DL (ref 74–99)
METER GLUCOSE: 122 MG/DL (ref 74–99)
METER GLUCOSE: 129 MG/DL (ref 74–99)
METER GLUCOSE: 130 MG/DL (ref 74–99)
METER GLUCOSE: 136 MG/DL (ref 74–99)
METER GLUCOSE: 142 MG/DL (ref 74–99)
METER GLUCOSE: 143 MG/DL (ref 74–99)
METER GLUCOSE: 161 MG/DL (ref 74–99)
METER GLUCOSE: 164 MG/DL (ref 74–99)
METER GLUCOSE: 177 MG/DL (ref 74–99)
METER GLUCOSE: 183 MG/DL (ref 74–99)
METER GLUCOSE: 221 MG/DL (ref 74–99)
METER GLUCOSE: 46 MG/DL (ref 74–99)
METER GLUCOSE: 65 MG/DL (ref 74–99)
METER GLUCOSE: 66 MG/DL (ref 74–99)
METER GLUCOSE: 69 MG/DL (ref 74–99)
METER GLUCOSE: 74 MG/DL (ref 74–99)
METER GLUCOSE: 76 MG/DL (ref 74–99)
METER GLUCOSE: 90 MG/DL (ref 74–99)
METER GLUCOSE: 91 MG/DL (ref 74–99)
METER GLUCOSE: 95 MG/DL (ref 74–99)
METHB: 0.3 % (ref 0–1.5)
METHB: 0.4 % (ref 0–1.5)
METHB: 0.5 % (ref 0–1.5)
MODE: ABNORMAL
MODE: AC
MONOCYTE, FLUID: 45 %
MONOCYTE, FLUID: 8 %
MONOCYTES ABSOLUTE: 0.43 E9/L (ref 0.1–0.95)
MONOCYTES ABSOLUTE: 0.48 E9/L (ref 0.1–0.95)
MONOCYTES ABSOLUTE: 0.58 E9/L (ref 0.1–0.95)
MONOCYTES ABSOLUTE: 0.6 E9/L (ref 0.1–0.95)
MONOCYTES ABSOLUTE: 0.66 E9/L (ref 0.1–0.95)
MONOCYTES ABSOLUTE: 0.88 E9/L (ref 0.1–0.95)
MONOCYTES ABSOLUTE: 1.14 E9/L (ref 0.1–0.95)
MONOCYTES ABSOLUTE: 1.22 E9/L (ref 0.1–0.95)
MONOCYTES ABSOLUTE: 1.43 E9/L (ref 0.1–0.95)
MONOCYTES RELATIVE PERCENT: 11.3 % (ref 2–12)
MONOCYTES RELATIVE PERCENT: 11.5 % (ref 2–12)
MONOCYTES RELATIVE PERCENT: 12.5 % (ref 2–12)
MONOCYTES RELATIVE PERCENT: 13 % (ref 2–12)
MONOCYTES RELATIVE PERCENT: 3.5 % (ref 2–12)
MONOCYTES RELATIVE PERCENT: 4.3 % (ref 2–12)
MONOCYTES RELATIVE PERCENT: 5.2 % (ref 2–12)
MONOCYTES RELATIVE PERCENT: 8 % (ref 2–12)
MONOCYTES RELATIVE PERCENT: 8.7 % (ref 2–12)
MRSA CULTURE ONLY: NORMAL
MYCOPLASMA PNEUMONIAE BY PCR: NOT DETECTED
MYCOPLASMA PNEUMONIAE BY PCR: NOT DETECTED
MYELOCYTE PERCENT: 0.9 % (ref 0–0)
NEUTROPHIL, FLUID: 26 %
NEUTROPHIL, FLUID: 55 %
NEUTROPHILS ABSOLUTE: 10.37 E9/L (ref 1.8–7.3)
NEUTROPHILS ABSOLUTE: 11.31 E9/L (ref 1.8–7.3)
NEUTROPHILS ABSOLUTE: 11.37 E9/L (ref 1.8–7.3)
NEUTROPHILS ABSOLUTE: 11.43 E9/L (ref 1.8–7.3)
NEUTROPHILS ABSOLUTE: 3.69 E9/L (ref 1.8–7.3)
NEUTROPHILS ABSOLUTE: 3.81 E9/L (ref 1.8–7.3)
NEUTROPHILS ABSOLUTE: 8.57 E9/L (ref 1.8–7.3)
NEUTROPHILS ABSOLUTE: 9.44 E9/L (ref 1.8–7.3)
NEUTROPHILS ABSOLUTE: 9.9 E9/L (ref 1.8–7.3)
NEUTROPHILS RELATIVE PERCENT: 69.9 % (ref 43–80)
NEUTROPHILS RELATIVE PERCENT: 75.4 % (ref 43–80)
NEUTROPHILS RELATIVE PERCENT: 78.2 % (ref 43–80)
NEUTROPHILS RELATIVE PERCENT: 85.2 % (ref 43–80)
NEUTROPHILS RELATIVE PERCENT: 90.3 % (ref 43–80)
NEUTROPHILS RELATIVE PERCENT: 90.4 % (ref 43–80)
NEUTROPHILS RELATIVE PERCENT: 93.9 % (ref 43–80)
NEUTROPHILS RELATIVE PERCENT: 94 % (ref 43–80)
NEUTROPHILS RELATIVE PERCENT: 95.7 % (ref 43–80)
NITRITE, URINE: NEGATIVE
NITRITE, URINE: POSITIVE
NUCLEATED CELLS FLUID: 125 /UL
NUCLEATED CELLS FLUID: 500 /UL
NUCLEATED RED BLOOD CELLS: 0.9 /100 WBC
NUCLEATED RED BLOOD CELLS: 0.9 /100 WBC
NUCLEATED RED BLOOD CELLS: 2.6 /100 WBC
NUCLEATED RED BLOOD CELLS: 2.6 /100 WBC
NUCLEATED RED BLOOD CELLS: 3.5 /100 WBC
NUCLEATED RED BLOOD CELLS: 6.1 /100 WBC
O2 CONTENT: 12.1 ML/DL
O2 CONTENT: 14.6 ML/DL
O2 CONTENT: 17.3 ML/DL
O2 CONTENT: 17.6 ML/DL
O2 CONTENT: 17.9 ML/DL
O2 CONTENT: 18.1 ML/DL
O2 CONTENT: 18.3 ML/DL
O2 CONTENT: 18.7 ML/DL
O2 CONTENT: 19.3 ML/DL
O2 CONTENT: 19.4 ML/DL
O2 CONTENT: 19.4 ML/DL
O2 CONTENT: 19.8 ML/DL
O2 CONTENT: 19.9 ML/DL
O2 CONTENT: 19.9 ML/DL
O2 CONTENT: 20.4 ML/DL
O2 CONTENT: 20.5 ML/DL
O2 SATURATION: 64.5 % (ref 92–98.5)
O2 SATURATION: 79.9 % (ref 92–98.5)
O2 SATURATION: 80.5 % (ref 92–98.5)
O2 SATURATION: 80.5 % (ref 92–98.5)
O2 SATURATION: 89 % (ref 92–98.5)
O2 SATURATION: 92.1 % (ref 92–98.5)
O2 SATURATION: 92.1 % (ref 92–98.5)
O2 SATURATION: 92.4 % (ref 92–98.5)
O2 SATURATION: 93.4 % (ref 92–98.5)
O2 SATURATION: 93.7 % (ref 92–98.5)
O2 SATURATION: 94 % (ref 92–98.5)
O2 SATURATION: 94.6 % (ref 92–98.5)
O2 SATURATION: 94.6 % (ref 92–98.5)
O2 SATURATION: 96.1 % (ref 92–98.5)
O2 SATURATION: 96.6 % (ref 92–98.5)
O2 SATURATION: 97 % (ref 92–98.5)
O2HB: 63.9 % (ref 94–97)
O2HB: 79 % (ref 94–97)
O2HB: 79.1 % (ref 94–97)
O2HB: 79.1 % (ref 94–97)
O2HB: 87.5 % (ref 94–97)
O2HB: 90.6 % (ref 94–97)
O2HB: 90.8 % (ref 94–97)
O2HB: 90.9 % (ref 94–97)
O2HB: 91.9 % (ref 94–97)
O2HB: 92.4 % (ref 94–97)
O2HB: 92.6 % (ref 94–97)
O2HB: 92.7 % (ref 94–97)
O2HB: 93 % (ref 94–97)
O2HB: 94.8 % (ref 94–97)
O2HB: 95.1 % (ref 94–97)
O2HB: 96.1 % (ref 94–97)
OPERATOR ID: 1632
OPERATOR ID: 1632
OPERATOR ID: 1874
OPERATOR ID: 1874
OPERATOR ID: 1893
OPERATOR ID: 1926
OPERATOR ID: 2420
OPERATOR ID: 2593
OPERATOR ID: 359
OPERATOR ID: 516
OPERATOR ID: ABNORMAL
ORGANISM: ABNORMAL
OVALOCYTES: ABNORMAL
PARAINFLUENZA VIRUS 1 BY PCR: NOT DETECTED
PARAINFLUENZA VIRUS 1 BY PCR: NOT DETECTED
PARAINFLUENZA VIRUS 2 BY PCR: NOT DETECTED
PARAINFLUENZA VIRUS 2 BY PCR: NOT DETECTED
PARAINFLUENZA VIRUS 3 BY PCR: NOT DETECTED
PARAINFLUENZA VIRUS 3 BY PCR: NOT DETECTED
PARAINFLUENZA VIRUS 4 BY PCR: NOT DETECTED
PARAINFLUENZA VIRUS 4 BY PCR: NOT DETECTED
PATIENT TEMP: 37 C
PCO2: 38.1 MMHG (ref 35–45)
PCO2: 38.2 MMHG (ref 35–45)
PCO2: 38.7 MMHG (ref 35–45)
PCO2: 39.3 MMHG (ref 35–45)
PCO2: 40.6 MMHG (ref 35–45)
PCO2: 43.3 MMHG (ref 35–45)
PCO2: 44 MMHG (ref 35–45)
PCO2: 45 MMHG (ref 35–45)
PCO2: 45.4 MMHG (ref 35–45)
PCO2: 46 MMHG (ref 35–45)
PCO2: 46.8 MMHG (ref 35–45)
PCO2: 49.2 MMHG (ref 35–45)
PCO2: 54.7 MMHG (ref 35–45)
PCO2: 56.4 MMHG (ref 35–45)
PCO2: 59.3 MMHG (ref 35–45)
PCO2: 60.1 MMHG (ref 35–45)
PDW BLD-RTO: 17.4 FL (ref 11.5–15)
PDW BLD-RTO: 17.6 FL (ref 11.5–15)
PDW BLD-RTO: 17.8 FL (ref 11.5–15)
PDW BLD-RTO: 18 FL (ref 11.5–15)
PDW BLD-RTO: 18.2 FL (ref 11.5–15)
PDW BLD-RTO: 18.3 FL (ref 11.5–15)
PDW BLD-RTO: 18.4 FL (ref 11.5–15)
PDW BLD-RTO: 18.6 FL (ref 11.5–15)
PDW BLD-RTO: 18.9 FL (ref 11.5–15)
PEEP/CPAP: 10 CMH2O
PEEP/CPAP: 12 CMH2O
PEEP/CPAP: 12 CMH2O
PEEP/CPAP: 14 CMH2O
PEEP/CPAP: 6 CMH2O
PEEP/CPAP: 8 CMH2O
PFO2: 0.48 MMHG/%
PFO2: 0.5 MMHG/%
PFO2: 0.61 MMHG/%
PFO2: 0.66 MMHG/%
PFO2: 0.67 MMHG/%
PFO2: 0.71 MMHG/%
PFO2: 0.73 MMHG/%
PFO2: 0.75 MMHG/%
PFO2: 0.75 MMHG/%
PFO2: 0.77 MMHG/%
PFO2: 0.78 MMHG/%
PFO2: 0.86 MMHG/%
PFO2: 0.88 MMHG/%
PFO2: 1.14 MMHG/%
PH BLOOD GAS: 7.02 (ref 7.35–7.45)
PH BLOOD GAS: 7.02 (ref 7.35–7.45)
PH BLOOD GAS: 7.3 (ref 7.35–7.45)
PH BLOOD GAS: 7.32 (ref 7.35–7.45)
PH BLOOD GAS: 7.32 (ref 7.35–7.45)
PH BLOOD GAS: 7.33 (ref 7.35–7.45)
PH BLOOD GAS: 7.33 (ref 7.35–7.45)
PH BLOOD GAS: 7.34 (ref 7.35–7.45)
PH BLOOD GAS: 7.34 (ref 7.35–7.45)
PH BLOOD GAS: 7.35 (ref 7.35–7.45)
PH BLOOD GAS: 7.35 (ref 7.35–7.45)
PH BLOOD GAS: 7.36 (ref 7.35–7.45)
PH BLOOD GAS: 7.37 (ref 7.35–7.45)
PH BLOOD GAS: 7.39 (ref 7.35–7.45)
PH BLOOD GAS: 7.4 (ref 7.35–7.45)
PH BLOOD GAS: 7.41 (ref 7.35–7.45)
PH FLUID: 7.42
PH UA: 5 (ref 5–9)
PH UA: 5.5 (ref 5–9)
PHOSPHORUS: 3.2 MG/DL (ref 2.5–4.5)
PHOSPHORUS: 3.6 MG/DL (ref 2.5–4.5)
PHOSPHORUS: 3.7 MG/DL (ref 2.5–4.5)
PHOSPHORUS: 3.8 MG/DL (ref 2.5–4.5)
PHOSPHORUS: 3.8 MG/DL (ref 2.5–4.5)
PHOSPHORUS: 3.9 MG/DL (ref 2.5–4.5)
PHOSPHORUS: 4 MG/DL (ref 2.5–4.5)
PHOSPHORUS: 4.1 MG/DL (ref 2.5–4.5)
PHOSPHORUS: 4.2 MG/DL (ref 2.5–4.5)
PHOSPHORUS: 4.4 MG/DL (ref 2.5–4.5)
PHOSPHORUS: 4.4 MG/DL (ref 2.5–4.5)
PHOSPHORUS: 4.5 MG/DL (ref 2.5–4.5)
PHOSPHORUS: 4.6 MG/DL (ref 2.5–4.5)
PHOSPHORUS: 4.7 MG/DL (ref 2.5–4.5)
PHOSPHORUS: 4.8 MG/DL (ref 2.5–4.5)
PHOSPHORUS: 4.9 MG/DL (ref 2.5–4.5)
PHOSPHORUS: 5 MG/DL (ref 2.5–4.5)
PHOSPHORUS: 6.2 MG/DL (ref 2.5–4.5)
PIP: 12 CMH2O
PLATELET # BLD: 111 E9/L (ref 130–450)
PLATELET # BLD: 133 E9/L (ref 130–450)
PLATELET # BLD: 136 E9/L (ref 130–450)
PLATELET # BLD: 136 E9/L (ref 130–450)
PLATELET # BLD: 142 E9/L (ref 130–450)
PLATELET # BLD: 144 E9/L (ref 130–450)
PLATELET # BLD: 152 E9/L (ref 130–450)
PLATELET # BLD: 153 E9/L (ref 130–450)
PLATELET # BLD: 86 E9/L (ref 130–450)
PLATELET # BLD: 90 E9/L (ref 130–450)
PLATELET # BLD: 90 E9/L (ref 130–450)
PLATELET CONFIRMATION: NORMAL
PMV BLD AUTO: 11.9 FL (ref 7–12)
PMV BLD AUTO: 12 FL (ref 7–12)
PMV BLD AUTO: 12.2 FL (ref 7–12)
PMV BLD AUTO: 12.4 FL (ref 7–12)
PMV BLD AUTO: 12.5 FL (ref 7–12)
PMV BLD AUTO: 12.6 FL (ref 7–12)
PMV BLD AUTO: 12.6 FL (ref 7–12)
PMV BLD AUTO: 12.8 FL (ref 7–12)
PMV BLD AUTO: 13 FL (ref 7–12)
PO2: 101.8 MMHG (ref 75–100)
PO2: 47.9 MMHG (ref 75–100)
PO2: 49.6 MMHG (ref 75–100)
PO2: 50.1 MMHG (ref 75–100)
PO2: 61.1 MMHG (ref 75–100)
PO2: 66.6 MMHG (ref 75–100)
PO2: 68.7 MMHG (ref 75–100)
PO2: 71 MMHG (ref 75–100)
PO2: 71.3 MMHG (ref 75–100)
PO2: 71.5 MMHG (ref 75–100)
PO2: 73.1 MMHG (ref 75–100)
PO2: 74.1 MMHG (ref 75–100)
PO2: 75.1 MMHG (ref 75–100)
PO2: 76.7 MMHG (ref 75–100)
PO2: 87.6 MMHG (ref 75–100)
PO2: 91.1 MMHG (ref 75–100)
POIKILOCYTES: ABNORMAL
POLYCHROMASIA: ABNORMAL
POTASSIUM REFLEX MAGNESIUM: 5.7 MMOL/L (ref 3.5–5)
POTASSIUM SERPL-SCNC: 4.5 MMOL/L (ref 3.5–5)
POTASSIUM SERPL-SCNC: 4.7 MMOL/L (ref 3.5–5)
POTASSIUM SERPL-SCNC: 4.8 MMOL/L (ref 3.5–5)
POTASSIUM SERPL-SCNC: 4.8 MMOL/L (ref 3.5–5)
POTASSIUM SERPL-SCNC: 4.89 MMOL/L (ref 3.5–5)
POTASSIUM SERPL-SCNC: 4.9 MMOL/L (ref 3.5–5)
POTASSIUM SERPL-SCNC: 5 MMOL/L (ref 3.5–5)
POTASSIUM SERPL-SCNC: 5.02 MMOL/L (ref 3.5–5)
POTASSIUM SERPL-SCNC: 5.1 MMOL/L (ref 3.5–5)
POTASSIUM SERPL-SCNC: 5.2 MMOL/L (ref 3.5–5)
POTASSIUM SERPL-SCNC: 5.3 MMOL/L (ref 3.5–5)
POTASSIUM SERPL-SCNC: 5.3 MMOL/L (ref 3.5–5)
POTASSIUM SERPL-SCNC: 5.4 MMOL/L (ref 3.5–5)
POTASSIUM SERPL-SCNC: 5.5 MMOL/L (ref 3.5–5)
POTASSIUM SERPL-SCNC: 5.5 MMOL/L (ref 3.5–5)
POTASSIUM SERPL-SCNC: 5.6 MMOL/L (ref 3.5–5)
POTASSIUM SERPL-SCNC: 5.7 MMOL/L (ref 3.5–5)
POTASSIUM SERPL-SCNC: 5.7 MMOL/L (ref 3.5–5)
POTASSIUM SERPL-SCNC: 5.79 MMOL/L (ref 3.5–5)
POTASSIUM SERPL-SCNC: 5.8 MMOL/L (ref 3.5–5)
POTASSIUM SERPL-SCNC: 6.4 MMOL/L (ref 3.5–5)
POTASSIUM, UR: 50.5 MMOL/L
POTASSIUM, UR: 51.4 MMOL/L
PRO-BNP: ABNORMAL PG/ML (ref 0–450)
PROCALCITONIN: 0.32 NG/ML (ref 0–0.08)
PROCALCITONIN: 0.46 NG/ML (ref 0–0.08)
PROCALCITONIN: 2.26 NG/ML (ref 0–0.08)
PROCALCITONIN: 4.15 NG/ML (ref 0–0.08)
PROSTATE SPECIFIC ANTIGEN: 7.05 NG/ML (ref 0–4)
PROTEIN FLUID: 1.6 G/DL
PROTEIN UA: 30 MG/DL
PROTEIN UA: 30 MG/DL
PROTHROMBIN TIME: 20.8 SEC (ref 9.3–12.4)
PROTHROMBIN TIME: 21 SEC (ref 9.3–12.4)
PROTHROMBIN TIME: 21.7 SEC (ref 9.3–12.4)
PROTHROMBIN TIME: 22.4 SEC (ref 9.3–12.4)
PROTHROMBIN TIME: 22.7 SEC (ref 9.3–12.4)
PROTHROMBIN TIME: 23 SEC (ref 9.3–12.4)
PROTHROMBIN TIME: 28.7 SEC (ref 9.3–12.4)
PROTHROMBIN TIME: 29.8 SEC (ref 9.3–12.4)
PROTHROMBIN TIME: 31.5 SEC (ref 9.3–12.4)
PROTHROMBIN TIME: 48.9 SEC (ref 9.3–12.4)
PROTHROMBIN TIME: 79.5 SEC (ref 9.3–12.4)
RBC # BLD: 4.12 E12/L (ref 3.8–5.8)
RBC # BLD: 4.15 E12/L (ref 3.8–5.8)
RBC # BLD: 4.21 E12/L (ref 3.8–5.8)
RBC # BLD: 4.51 E12/L (ref 3.8–5.8)
RBC # BLD: 4.6 E12/L (ref 3.8–5.8)
RBC # BLD: 4.64 E12/L (ref 3.8–5.8)
RBC # BLD: 4.64 E12/L (ref 3.8–5.8)
RBC # BLD: 4.65 E12/L (ref 3.8–5.8)
RBC # BLD: 4.69 E12/L (ref 3.8–5.8)
RBC # BLD: 4.81 E12/L (ref 3.8–5.8)
RBC # BLD: 4.84 E12/L (ref 3.8–5.8)
RBC FLUID: <2000 /UL
RBC FLUID: NORMAL /UL
RBC UA: >20 /HPF (ref 0–2)
RBC UA: ABNORMAL /HPF (ref 0–2)
REASON FOR REJECTION: NORMAL
REJECTED TEST: NORMAL
RESPIRATORY SYNCYTIAL VIRUS BY PCR: NOT DETECTED
RESPIRATORY SYNCYTIAL VIRUS BY PCR: NOT DETECTED
RI(T): 11.53
RI(T): 12.44
RI(T): 4.52
RI(T): 6.26
RI(T): 6.26
RI(T): 7.19
RI(T): 7.39
RI(T): 7.4
RI(T): 7.46
RI(T): 7.89
RI(T): 8.08
RI(T): 8.44
RI(T): 8.48
RI(T): 9.52
RR MECHANICAL: 16 B/MIN
RR MECHANICAL: 22 B/MIN
RR MECHANICAL: 25 B/MIN
SARS-COV-2, NAAT: NOT DETECTED
SARS-COV-2, PCR: NOT DETECTED
SARS-COV-2, PCR: NOT DETECTED
SEDIMENTATION RATE, ERYTHROCYTE: 1 MM/HR (ref 0–15)
SEDIMENTATION RATE, ERYTHROCYTE: 12 MM/HR (ref 0–15)
SMEAR, RESPIRATORY: ABNORMAL
SMEAR, RESPIRATORY: NORMAL
SODIUM BLD-SCNC: 128 MMOL/L (ref 132–146)
SODIUM BLD-SCNC: 132 MMOL/L (ref 132–146)
SODIUM BLD-SCNC: 132 MMOL/L (ref 132–146)
SODIUM BLD-SCNC: 133 MMOL/L (ref 132–146)
SODIUM BLD-SCNC: 133 MMOL/L (ref 132–146)
SODIUM BLD-SCNC: 134 MMOL/L (ref 132–146)
SODIUM BLD-SCNC: 135 MMOL/L (ref 132–146)
SODIUM BLD-SCNC: 136 MMOL/L (ref 132–146)
SODIUM BLD-SCNC: 137 MMOL/L (ref 132–146)
SODIUM BLD-SCNC: 138 MMOL/L (ref 132–146)
SODIUM BLD-SCNC: 139 MMOL/L (ref 132–146)
SODIUM BLD-SCNC: 140 MMOL/L (ref 132–146)
SODIUM BLD-SCNC: 140 MMOL/L (ref 132–146)
SODIUM BLD-SCNC: 143 MMOL/L (ref 132–146)
SODIUM BLD-SCNC: 144 MMOL/L (ref 132–146)
SODIUM URINE: 23 MMOL/L
SODIUM URINE: 64 MMOL/L
SOURCE, BLOOD GAS: ABNORMAL
SOURCE, BLOOD GAS: NORMAL
SPECIFIC GRAVITY UA: 1.02 (ref 1–1.03)
SPECIFIC GRAVITY UA: 1.02 (ref 1–1.03)
STREP PNEUMONIAE ANTIGEN, URINE: NORMAL
TARGET CELLS: ABNORMAL
THB: 13.1 G/DL (ref 11.5–16.5)
THB: 13.3 G/DL (ref 11.5–16.5)
THB: 13.4 G/DL (ref 11.5–16.5)
THB: 14 G/DL (ref 11.5–16.5)
THB: 14.3 G/DL (ref 11.5–16.5)
THB: 14.6 G/DL (ref 11.5–16.5)
THB: 14.8 G/DL (ref 11.5–16.5)
THB: 14.9 G/DL (ref 11.5–16.5)
THB: 15.1 G/DL (ref 11.5–16.5)
THB: 15.2 G/DL (ref 11.5–16.5)
THB: 15.3 G/DL (ref 11.5–16.5)
THB: 15.6 G/DL (ref 11.5–16.5)
THB: 15.9 G/DL (ref 11.5–16.5)
THB: 16.1 G/DL (ref 11.5–16.5)
TIME ANALYZED: 1128
TIME ANALYZED: 1254
TIME ANALYZED: 1623
TIME ANALYZED: 1803
TIME ANALYZED: 2002
TIME ANALYZED: 2025
TIME ANALYZED: 2049
TIME ANALYZED: 2136
TIME ANALYZED: 2226
TIME ANALYZED: 2228
TIME ANALYZED: 2231
TIME ANALYZED: 315
TIME ANALYZED: 450
TIME ANALYZED: 501
TIME ANALYZED: 524
TIME ANALYZED: 602
TIME ANALYZED: 949
TOTAL CK: 63 U/L (ref 20–200)
TOTAL PROTEIN: 5.7 G/DL (ref 6.4–8.3)
TOTAL PROTEIN: 5.8 G/DL (ref 6.4–8.3)
TOTAL PROTEIN: 5.9 G/DL (ref 6.4–8.3)
TOTAL PROTEIN: 5.9 G/DL (ref 6.4–8.3)
TOTAL PROTEIN: 6 G/DL (ref 6.4–8.3)
TOTAL PROTEIN: 6.2 G/DL (ref 6.4–8.3)
TOTAL PROTEIN: 6.2 G/DL (ref 6.4–8.3)
TOTAL PROTEIN: 6.4 G/DL (ref 6.4–8.3)
TOTAL PROTEIN: 6.6 G/DL (ref 6.4–8.3)
TRIGL SERPL-MCNC: 48 MG/DL (ref 0–149)
TROPONIN: 0.04 NG/ML (ref 0–0.03)
TSH SERPL DL<=0.05 MIU/L-ACNC: 0.86 UIU/ML (ref 0.27–4.2)
UREA NITROGEN, UR: 398 MG/DL (ref 800–1666)
UREA NITROGEN, UR: 570 MG/DL (ref 800–1666)
URINE CULTURE, ROUTINE: NORMAL
UROBILINOGEN, URINE: 0.2 E.U./DL
UROBILINOGEN, URINE: 1 E.U./DL
VACUOLATED NEUTROPHILS: ABNORMAL
VLDLC SERPL CALC-MCNC: 10 MG/DL
VT MECHANICAL: 500 ML
WBC # BLD: 10.3 E9/L (ref 4.5–11.5)
WBC # BLD: 10.8 E9/L (ref 4.5–11.5)
WBC # BLD: 11 E9/L (ref 4.5–11.5)
WBC # BLD: 11 E9/L (ref 4.5–11.5)
WBC # BLD: 11.1 E9/L (ref 4.5–11.5)
WBC # BLD: 11.9 E9/L (ref 4.5–11.5)
WBC # BLD: 12.1 E9/L (ref 4.5–11.5)
WBC # BLD: 12.7 E9/L (ref 4.5–11.5)
WBC # BLD: 13.2 E9/L (ref 4.5–11.5)
WBC # BLD: 5.1 E9/L (ref 4.5–11.5)
WBC # BLD: 5.3 E9/L (ref 4.5–11.5)
WBC UA: ABNORMAL /HPF (ref 0–5)
WBC UA: ABNORMAL /HPF (ref 0–5)

## 2021-01-01 PROCEDURE — 84484 ASSAY OF TROPONIN QUANT: CPT

## 2021-01-01 PROCEDURE — 6360000002 HC RX W HCPCS: Performed by: INTERNAL MEDICINE

## 2021-01-01 PROCEDURE — 80162 ASSAY OF DIGOXIN TOTAL: CPT

## 2021-01-01 PROCEDURE — 2500000003 HC RX 250 WO HCPCS: Performed by: INTERNAL MEDICINE

## 2021-01-01 PROCEDURE — 71045 X-RAY EXAM CHEST 1 VIEW: CPT

## 2021-01-01 PROCEDURE — 93005 ELECTROCARDIOGRAM TRACING: CPT | Performed by: NURSE PRACTITIONER

## 2021-01-01 PROCEDURE — 05HN33Z INSERTION OF INFUSION DEVICE INTO LEFT INTERNAL JUGULAR VEIN, PERCUTANEOUS APPROACH: ICD-10-PCS | Performed by: INTERNAL MEDICINE

## 2021-01-01 PROCEDURE — 99233 SBSQ HOSP IP/OBS HIGH 50: CPT | Performed by: NURSE PRACTITIONER

## 2021-01-01 PROCEDURE — 6370000000 HC RX 637 (ALT 250 FOR IP): Performed by: INTERNAL MEDICINE

## 2021-01-01 PROCEDURE — 2000000000 HC ICU R&B

## 2021-01-01 PROCEDURE — 51702 INSERT TEMP BLADDER CATH: CPT

## 2021-01-01 PROCEDURE — 86140 C-REACTIVE PROTEIN: CPT

## 2021-01-01 PROCEDURE — 84145 PROCALCITONIN (PCT): CPT

## 2021-01-01 PROCEDURE — 2700000000 HC OXYGEN THERAPY PER DAY

## 2021-01-01 PROCEDURE — 83735 ASSAY OF MAGNESIUM: CPT

## 2021-01-01 PROCEDURE — 93283 PRGRMG EVAL IMPLANTABLE DFB: CPT | Performed by: SPECIALIST

## 2021-01-01 PROCEDURE — 80053 COMPREHEN METABOLIC PANEL: CPT

## 2021-01-01 PROCEDURE — 94640 AIRWAY INHALATION TREATMENT: CPT

## 2021-01-01 PROCEDURE — 84300 ASSAY OF URINE SODIUM: CPT

## 2021-01-01 PROCEDURE — 94003 VENT MGMT INPAT SUBQ DAY: CPT

## 2021-01-01 PROCEDURE — 2500000003 HC RX 250 WO HCPCS

## 2021-01-01 PROCEDURE — 2580000003 HC RX 258: Performed by: INTERNAL MEDICINE

## 2021-01-01 PROCEDURE — 93975 VASCULAR STUDY: CPT

## 2021-01-01 PROCEDURE — 87205 SMEAR GRAM STAIN: CPT

## 2021-01-01 PROCEDURE — APPSS180 APP SPLIT SHARED TIME > 60 MINUTES: Performed by: NURSE PRACTITIONER

## 2021-01-01 PROCEDURE — P9047 ALBUMIN (HUMAN), 25%, 50ML: HCPCS | Performed by: INTERNAL MEDICINE

## 2021-01-01 PROCEDURE — 87040 BLOOD CULTURE FOR BACTERIA: CPT

## 2021-01-01 PROCEDURE — 83986 ASSAY PH BODY FLUID NOS: CPT

## 2021-01-01 PROCEDURE — 85014 HEMATOCRIT: CPT

## 2021-01-01 PROCEDURE — 5A1945Z RESPIRATORY VENTILATION, 24-96 CONSECUTIVE HOURS: ICD-10-PCS | Performed by: INTERNAL MEDICINE

## 2021-01-01 PROCEDURE — 84520 ASSAY OF UREA NITROGEN: CPT

## 2021-01-01 PROCEDURE — 36620 INSERTION CATHETER ARTERY: CPT

## 2021-01-01 PROCEDURE — 93010 ELECTROCARDIOGRAM REPORT: CPT | Performed by: INTERNAL MEDICINE

## 2021-01-01 PROCEDURE — 84153 ASSAY OF PSA TOTAL: CPT

## 2021-01-01 PROCEDURE — 82436 ASSAY OF URINE CHLORIDE: CPT

## 2021-01-01 PROCEDURE — 85610 PROTHROMBIN TIME: CPT

## 2021-01-01 PROCEDURE — 0202U NFCT DS 22 TRGT SARS-COV-2: CPT

## 2021-01-01 PROCEDURE — 6360000002 HC RX W HCPCS: Performed by: FAMILY MEDICINE

## 2021-01-01 PROCEDURE — 6360000002 HC RX W HCPCS

## 2021-01-01 PROCEDURE — 87070 CULTURE OTHR SPECIMN AEROBIC: CPT

## 2021-01-01 PROCEDURE — 84540 ASSAY OF URINE/UREA-N: CPT

## 2021-01-01 PROCEDURE — 85018 HEMOGLOBIN: CPT

## 2021-01-01 PROCEDURE — 99232 SBSQ HOSP IP/OBS MODERATE 35: CPT | Performed by: NURSE PRACTITIONER

## 2021-01-01 PROCEDURE — 85027 COMPLETE CBC AUTOMATED: CPT

## 2021-01-01 PROCEDURE — 84132 ASSAY OF SERUM POTASSIUM: CPT

## 2021-01-01 PROCEDURE — 71250 CT THORAX DX C-: CPT

## 2021-01-01 PROCEDURE — 36415 COLL VENOUS BLD VENIPUNCTURE: CPT

## 2021-01-01 PROCEDURE — 96374 THER/PROPH/DIAG INJ IV PUSH: CPT

## 2021-01-01 PROCEDURE — 84100 ASSAY OF PHOSPHORUS: CPT

## 2021-01-01 PROCEDURE — 0W993ZZ DRAINAGE OF RIGHT PLEURAL CAVITY, PERCUTANEOUS APPROACH: ICD-10-PCS | Performed by: RADIOLOGY

## 2021-01-01 PROCEDURE — 93005 ELECTROCARDIOGRAM TRACING: CPT | Performed by: INTERNAL MEDICINE

## 2021-01-01 PROCEDURE — 85651 RBC SED RATE NONAUTOMATED: CPT

## 2021-01-01 PROCEDURE — 81001 URINALYSIS AUTO W/SCOPE: CPT

## 2021-01-01 PROCEDURE — 99215 OFFICE O/P EST HI 40 MIN: CPT | Performed by: INTERNAL MEDICINE

## 2021-01-01 PROCEDURE — 80048 BASIC METABOLIC PNL TOTAL CA: CPT

## 2021-01-01 PROCEDURE — 87081 CULTURE SCREEN ONLY: CPT

## 2021-01-01 PROCEDURE — 94660 CPAP INITIATION&MGMT: CPT

## 2021-01-01 PROCEDURE — 32555 ASPIRATE PLEURA W/ IMAGING: CPT

## 2021-01-01 PROCEDURE — 83615 LACTATE (LD) (LDH) ENZYME: CPT

## 2021-01-01 PROCEDURE — 85025 COMPLETE CBC W/AUTO DIFF WBC: CPT

## 2021-01-01 PROCEDURE — 87015 SPECIMEN INFECT AGNT CONCNTJ: CPT

## 2021-01-01 PROCEDURE — 82805 BLOOD GASES W/O2 SATURATION: CPT

## 2021-01-01 PROCEDURE — 1123F ACP DISCUSS/DSCN MKR DOCD: CPT | Performed by: INTERNAL MEDICINE

## 2021-01-01 PROCEDURE — 82962 GLUCOSE BLOOD TEST: CPT

## 2021-01-01 PROCEDURE — 84443 ASSAY THYROID STIM HORMONE: CPT

## 2021-01-01 PROCEDURE — 87077 CULTURE AEROBIC IDENTIFY: CPT

## 2021-01-01 PROCEDURE — 84133 ASSAY OF URINE POTASSIUM: CPT

## 2021-01-01 PROCEDURE — 6370000000 HC RX 637 (ALT 250 FOR IP): Performed by: NURSE PRACTITIONER

## 2021-01-01 PROCEDURE — 82533 TOTAL CORTISOL: CPT

## 2021-01-01 PROCEDURE — 93970 EXTREMITY STUDY: CPT

## 2021-01-01 PROCEDURE — 89051 BODY FLUID CELL COUNT: CPT

## 2021-01-01 PROCEDURE — 94002 VENT MGMT INPAT INIT DAY: CPT

## 2021-01-01 PROCEDURE — 85378 FIBRIN DEGRADE SEMIQUANT: CPT

## 2021-01-01 PROCEDURE — 87635 SARS-COV-2 COVID-19 AMP PRB: CPT

## 2021-01-01 PROCEDURE — 06HY33Z INSERTION OF INFUSION DEVICE INTO LOWER VEIN, PERCUTANEOUS APPROACH: ICD-10-PCS | Performed by: INTERNAL MEDICINE

## 2021-01-01 PROCEDURE — 76705 ECHO EXAM OF ABDOMEN: CPT

## 2021-01-01 PROCEDURE — 80061 LIPID PANEL: CPT

## 2021-01-01 PROCEDURE — 99285 EMERGENCY DEPT VISIT HI MDM: CPT

## 2021-01-01 PROCEDURE — 82330 ASSAY OF CALCIUM: CPT

## 2021-01-01 PROCEDURE — 88305 TISSUE EXAM BY PATHOLOGIST: CPT

## 2021-01-01 PROCEDURE — 3023F SPIROM DOC REV: CPT | Performed by: INTERNAL MEDICINE

## 2021-01-01 PROCEDURE — 85730 THROMBOPLASTIN TIME PARTIAL: CPT

## 2021-01-01 PROCEDURE — 83880 ASSAY OF NATRIURETIC PEPTIDE: CPT

## 2021-01-01 PROCEDURE — 2060000000 HC ICU INTERMEDIATE R&B

## 2021-01-01 PROCEDURE — 87186 SC STD MICRODIL/AGAR DIL: CPT

## 2021-01-01 PROCEDURE — 87206 SMEAR FLUORESCENT/ACID STAI: CPT

## 2021-01-01 PROCEDURE — 6370000000 HC RX 637 (ALT 250 FOR IP): Performed by: FAMILY MEDICINE

## 2021-01-01 PROCEDURE — 2580000003 HC RX 258: Performed by: FAMILY MEDICINE

## 2021-01-01 PROCEDURE — 82570 ASSAY OF URINE CREATININE: CPT

## 2021-01-01 PROCEDURE — 84155 ASSAY OF PROTEIN SERUM: CPT

## 2021-01-01 PROCEDURE — 93306 TTE W/DOPPLER COMPLETE: CPT

## 2021-01-01 PROCEDURE — 31500 INSERT EMERGENCY AIRWAY: CPT

## 2021-01-01 PROCEDURE — 88112 CYTOPATH CELL ENHANCE TECH: CPT

## 2021-01-01 PROCEDURE — 5A1D70Z PERFORMANCE OF URINARY FILTRATION, INTERMITTENT, LESS THAN 6 HOURS PER DAY: ICD-10-PCS | Performed by: INTERNAL MEDICINE

## 2021-01-01 PROCEDURE — 36556 INSERT NON-TUNNEL CV CATH: CPT

## 2021-01-01 PROCEDURE — 87088 URINE BACTERIA CULTURE: CPT

## 2021-01-01 PROCEDURE — APPSS30 APP SPLIT SHARED TIME 16-30 MINUTES: Performed by: CLINICAL NURSE SPECIALIST

## 2021-01-01 PROCEDURE — 93000 ELECTROCARDIOGRAM COMPLETE: CPT | Performed by: INTERNAL MEDICINE

## 2021-01-01 PROCEDURE — 2500000003 HC RX 250 WO HCPCS: Performed by: FAMILY MEDICINE

## 2021-01-01 PROCEDURE — 89220 SPUTUM SPECIMEN COLLECTION: CPT

## 2021-01-01 PROCEDURE — 6370000000 HC RX 637 (ALT 250 FOR IP): Performed by: EMERGENCY MEDICINE

## 2021-01-01 PROCEDURE — 99223 1ST HOSP IP/OBS HIGH 75: CPT | Performed by: SPECIALIST

## 2021-01-01 PROCEDURE — 80076 HEPATIC FUNCTION PANEL: CPT

## 2021-01-01 PROCEDURE — 83605 ASSAY OF LACTIC ACID: CPT

## 2021-01-01 PROCEDURE — 93295 DEV INTERROG REMOTE 1/2/MLT: CPT | Performed by: INTERNAL MEDICINE

## 2021-01-01 PROCEDURE — 1036F TOBACCO NON-USER: CPT | Performed by: INTERNAL MEDICINE

## 2021-01-01 PROCEDURE — 4040F PNEUMOC VAC/ADMIN/RCVD: CPT | Performed by: INTERNAL MEDICINE

## 2021-01-01 PROCEDURE — G8427 DOCREV CUR MEDS BY ELIG CLIN: HCPCS | Performed by: INTERNAL MEDICINE

## 2021-01-01 PROCEDURE — 93970 EXTREMITY STUDY: CPT | Performed by: RADIOLOGY

## 2021-01-01 PROCEDURE — 82553 CREATINE MB FRACTION: CPT

## 2021-01-01 PROCEDURE — 82947 ASSAY GLUCOSE BLOOD QUANT: CPT

## 2021-01-01 PROCEDURE — 87102 FUNGUS ISOLATION CULTURE: CPT

## 2021-01-01 PROCEDURE — 6360000002 HC RX W HCPCS: Performed by: EMERGENCY MEDICINE

## 2021-01-01 PROCEDURE — G8926 SPIRO NO PERF OR DOC: HCPCS | Performed by: INTERNAL MEDICINE

## 2021-01-01 PROCEDURE — 82550 ASSAY OF CK (CPK): CPT

## 2021-01-01 PROCEDURE — 99222 1ST HOSP IP/OBS MODERATE 55: CPT | Performed by: INTERNAL MEDICINE

## 2021-01-01 PROCEDURE — 99233 SBSQ HOSP IP/OBS HIGH 50: CPT | Performed by: INTERNAL MEDICINE

## 2021-01-01 PROCEDURE — 84157 ASSAY OF PROTEIN OTHER: CPT

## 2021-01-01 PROCEDURE — 87449 NOS EACH ORGANISM AG IA: CPT

## 2021-01-01 PROCEDURE — 2580000003 HC RX 258: Performed by: NURSE ANESTHETIST, CERTIFIED REGISTERED

## 2021-01-01 PROCEDURE — 0BC68ZZ EXTIRPATION OF MATTER FROM RIGHT LOWER LOBE BRONCHUS, VIA NATURAL OR ARTIFICIAL OPENING ENDOSCOPIC: ICD-10-PCS | Performed by: INTERNAL MEDICINE

## 2021-01-01 PROCEDURE — 83690 ASSAY OF LIPASE: CPT

## 2021-01-01 PROCEDURE — 90945 DIALYSIS ONE EVALUATION: CPT

## 2021-01-01 PROCEDURE — 74018 RADEX ABDOMEN 1 VIEW: CPT

## 2021-01-01 PROCEDURE — G8484 FLU IMMUNIZE NO ADMIN: HCPCS | Performed by: INTERNAL MEDICINE

## 2021-01-01 PROCEDURE — 87116 MYCOBACTERIA CULTURE: CPT

## 2021-01-01 PROCEDURE — 37799 UNLISTED PX VASCULAR SURGERY: CPT

## 2021-01-01 PROCEDURE — 0BH17EZ INSERTION OF ENDOTRACHEAL AIRWAY INTO TRACHEA, VIA NATURAL OR ARTIFICIAL OPENING: ICD-10-PCS | Performed by: INTERNAL MEDICINE

## 2021-01-01 PROCEDURE — G8417 CALC BMI ABV UP PARAM F/U: HCPCS | Performed by: INTERNAL MEDICINE

## 2021-01-01 PROCEDURE — 93296 REM INTERROG EVL PM/IDS: CPT | Performed by: INTERNAL MEDICINE

## 2021-01-01 PROCEDURE — 85384 FIBRINOGEN ACTIVITY: CPT

## 2021-01-01 PROCEDURE — 82565 ASSAY OF CREATININE: CPT

## 2021-01-01 RX ORDER — 0.9 % SODIUM CHLORIDE 0.9 %
1000 INTRAVENOUS SOLUTION INTRAVENOUS
Status: ACTIVE | OUTPATIENT
Start: 2021-01-01 | End: 2021-01-01

## 2021-01-01 RX ORDER — DOPAMINE HYDROCHLORIDE 320 MG/100ML
2-20 INJECTION, SOLUTION INTRAVENOUS CONTINUOUS
Status: DISCONTINUED | OUTPATIENT
Start: 2021-01-01 | End: 2021-03-28 | Stop reason: HOSPADM

## 2021-01-01 RX ORDER — FINASTERIDE 5 MG/1
5 TABLET, FILM COATED ORAL DAILY
Status: DISCONTINUED | OUTPATIENT
Start: 2021-01-01 | End: 2021-03-28 | Stop reason: HOSPADM

## 2021-01-01 RX ORDER — BUMETANIDE 0.25 MG/ML
1 INJECTION, SOLUTION INTRAMUSCULAR; INTRAVENOUS 2 TIMES DAILY
Status: DISCONTINUED | OUTPATIENT
Start: 2021-01-01 | End: 2021-03-28 | Stop reason: HOSPADM

## 2021-01-01 RX ORDER — DEXTROSE MONOHYDRATE 25 G/50ML
25 INJECTION, SOLUTION INTRAVENOUS ONCE
Status: COMPLETED | OUTPATIENT
Start: 2021-01-01 | End: 2021-01-01

## 2021-01-01 RX ORDER — MIDAZOLAM HYDROCHLORIDE 1 MG/ML
INJECTION INTRAMUSCULAR; INTRAVENOUS
Status: COMPLETED
Start: 2021-01-01 | End: 2021-01-01

## 2021-01-01 RX ORDER — SODIUM CHLORIDE 0.9 % (FLUSH) 0.9 %
10 SYRINGE (ML) INJECTION PRN
Status: DISCONTINUED | OUTPATIENT
Start: 2021-01-01 | End: 2021-03-28 | Stop reason: HOSPADM

## 2021-01-01 RX ORDER — ASPIRIN 81 MG/1
324 TABLET, CHEWABLE ORAL ONCE
Status: COMPLETED | OUTPATIENT
Start: 2021-01-01 | End: 2021-01-01

## 2021-01-01 RX ORDER — FUROSEMIDE 10 MG/ML
40 INJECTION INTRAMUSCULAR; INTRAVENOUS ONCE
Status: COMPLETED | OUTPATIENT
Start: 2021-01-01 | End: 2021-01-01

## 2021-01-01 RX ORDER — ARFORMOTEROL TARTRATE 15 UG/2ML
15 SOLUTION RESPIRATORY (INHALATION) 2 TIMES DAILY
Status: DISCONTINUED | OUTPATIENT
Start: 2021-01-01 | End: 2021-03-28 | Stop reason: HOSPADM

## 2021-01-01 RX ORDER — BUMETANIDE 0.25 MG/ML
2 INJECTION, SOLUTION INTRAMUSCULAR; INTRAVENOUS ONCE
Status: COMPLETED | OUTPATIENT
Start: 2021-01-01 | End: 2021-01-01

## 2021-01-01 RX ORDER — SODIUM CHLORIDE 9 MG/ML
INJECTION, SOLUTION INTRAVENOUS CONTINUOUS PRN
Status: DISCONTINUED | OUTPATIENT
Start: 2021-01-01 | End: 2021-01-01 | Stop reason: HOSPADM

## 2021-01-01 RX ORDER — METOPROLOL TARTRATE 5 MG/5ML
INJECTION INTRAVENOUS
Status: COMPLETED
Start: 2021-01-01 | End: 2021-01-01

## 2021-01-01 RX ORDER — FUROSEMIDE 10 MG/ML
INJECTION INTRAMUSCULAR; INTRAVENOUS
Status: COMPLETED
Start: 2021-01-01 | End: 2021-01-01

## 2021-01-01 RX ORDER — POTASSIUM CHLORIDE 29.8 MG/ML
20 INJECTION INTRAVENOUS PRN
Status: DISCONTINUED | OUTPATIENT
Start: 2021-01-01 | End: 2021-03-28 | Stop reason: HOSPADM

## 2021-01-01 RX ORDER — METOPROLOL TARTRATE 5 MG/5ML
2.5 INJECTION INTRAVENOUS ONCE
Status: DISCONTINUED | OUTPATIENT
Start: 2021-01-01 | End: 2021-01-01

## 2021-01-01 RX ORDER — WARFARIN SODIUM 2 MG/1
2 TABLET ORAL
Status: DISCONTINUED | OUTPATIENT
Start: 2021-01-01 | End: 2021-01-01

## 2021-01-01 RX ORDER — MIDAZOLAM HYDROCHLORIDE 2 MG/2ML
4 INJECTION, SOLUTION INTRAMUSCULAR; INTRAVENOUS ONCE
Status: COMPLETED | OUTPATIENT
Start: 2021-01-01 | End: 2021-01-01

## 2021-01-01 RX ORDER — FUROSEMIDE 10 MG/ML
40 INJECTION INTRAMUSCULAR; INTRAVENOUS 2 TIMES DAILY
Status: DISCONTINUED | OUTPATIENT
Start: 2021-01-01 | End: 2021-01-01

## 2021-01-01 RX ORDER — PROMETHAZINE HYDROCHLORIDE 25 MG/1
12.5 TABLET ORAL EVERY 6 HOURS PRN
Status: DISCONTINUED | OUTPATIENT
Start: 2021-01-01 | End: 2021-01-01

## 2021-01-01 RX ORDER — SODIUM CHLORIDE 9 MG/ML
INJECTION, SOLUTION INTRAVENOUS PRN
Status: DISCONTINUED | OUTPATIENT
Start: 2021-01-01 | End: 2021-03-28 | Stop reason: HOSPADM

## 2021-01-01 RX ORDER — LIDOCAINE HYDROCHLORIDE ANHYDROUS AND DEXTROSE MONOHYDRATE .4; 5 G/100ML; G/100ML
2 INJECTION, SOLUTION INTRAVENOUS CONTINUOUS
Status: DISCONTINUED | OUTPATIENT
Start: 2021-01-01 | End: 2021-03-28 | Stop reason: HOSPADM

## 2021-01-01 RX ORDER — METOPROLOL TARTRATE 5 MG/5ML
2.5 INJECTION INTRAVENOUS ONCE
Status: COMPLETED | OUTPATIENT
Start: 2021-01-01 | End: 2021-01-01

## 2021-01-01 RX ORDER — WARFARIN SODIUM 3 MG/1
3 TABLET ORAL
Status: DISCONTINUED | OUTPATIENT
Start: 2021-01-01 | End: 2021-01-01

## 2021-01-01 RX ORDER — NICOTINE POLACRILEX 4 MG
15 LOZENGE BUCCAL PRN
Status: DISCONTINUED | OUTPATIENT
Start: 2021-01-01 | End: 2021-03-28 | Stop reason: HOSPADM

## 2021-01-01 RX ORDER — MIDAZOLAM HYDROCHLORIDE 2 MG/2ML
2 INJECTION, SOLUTION INTRAMUSCULAR; INTRAVENOUS ONCE
Status: COMPLETED | OUTPATIENT
Start: 2021-01-01 | End: 2021-01-01

## 2021-01-01 RX ORDER — HEPARIN SODIUM 10000 [USP'U]/100ML
5-30 INJECTION, SOLUTION INTRAVENOUS CONTINUOUS
Status: DISCONTINUED | OUTPATIENT
Start: 2021-01-01 | End: 2021-03-28 | Stop reason: HOSPADM

## 2021-01-01 RX ORDER — DEXTROSE MONOHYDRATE 50 MG/ML
100 INJECTION, SOLUTION INTRAVENOUS PRN
Status: DISCONTINUED | OUTPATIENT
Start: 2021-01-01 | End: 2021-01-01 | Stop reason: SDUPTHER

## 2021-01-01 RX ORDER — ISOSORBIDE MONONITRATE 30 MG/1
30 TABLET, EXTENDED RELEASE ORAL DAILY
Status: DISCONTINUED | OUTPATIENT
Start: 2021-01-01 | End: 2021-03-28 | Stop reason: HOSPADM

## 2021-01-01 RX ORDER — IPRATROPIUM BROMIDE AND ALBUTEROL SULFATE 2.5; .5 MG/3ML; MG/3ML
1 SOLUTION RESPIRATORY (INHALATION)
Status: DISCONTINUED | OUTPATIENT
Start: 2021-01-01 | End: 2021-03-28 | Stop reason: HOSPADM

## 2021-01-01 RX ORDER — PHENYLEPHRINE HYDROCHLORIDE 10 MG/ML
INJECTION INTRAVENOUS
Status: COMPLETED
Start: 2021-01-01 | End: 2021-01-01

## 2021-01-01 RX ORDER — DIGOXIN 0.25 MG/ML
250 INJECTION INTRAMUSCULAR; INTRAVENOUS ONCE
Status: COMPLETED | OUTPATIENT
Start: 2021-01-01 | End: 2021-01-01

## 2021-01-01 RX ORDER — PHENYLEPHRINE HYDROCHLORIDE 10 MG/ML
INJECTION INTRAVENOUS
Status: DISCONTINUED
Start: 2021-01-01 | End: 2021-03-28 | Stop reason: HOSPADM

## 2021-01-01 RX ORDER — CHLORHEXIDINE GLUCONATE 0.12 MG/ML
15 RINSE ORAL 2 TIMES DAILY
Status: DISCONTINUED | OUTPATIENT
Start: 2021-01-01 | End: 2021-03-28 | Stop reason: HOSPADM

## 2021-01-01 RX ORDER — NICOTINE POLACRILEX 4 MG
15 LOZENGE BUCCAL PRN
Status: DISCONTINUED | OUTPATIENT
Start: 2021-01-01 | End: 2021-01-01 | Stop reason: SDUPTHER

## 2021-01-01 RX ORDER — HEPARIN SODIUM 1000 [USP'U]/ML
60 INJECTION, SOLUTION INTRAVENOUS; SUBCUTANEOUS PRN
Status: DISCONTINUED | OUTPATIENT
Start: 2021-01-01 | End: 2021-03-28 | Stop reason: HOSPADM

## 2021-01-01 RX ORDER — DIGOXIN 0.25 MG/ML
62.5 INJECTION INTRAMUSCULAR; INTRAVENOUS ONCE
Status: COMPLETED | OUTPATIENT
Start: 2021-01-01 | End: 2021-01-01

## 2021-01-01 RX ORDER — PANTOPRAZOLE SODIUM 40 MG/1
40 TABLET, DELAYED RELEASE ORAL
Status: DISCONTINUED | OUTPATIENT
Start: 2021-01-01 | End: 2021-03-28 | Stop reason: HOSPADM

## 2021-01-01 RX ORDER — DEXTROSE MONOHYDRATE 25 G/50ML
12.5 INJECTION, SOLUTION INTRAVENOUS PRN
Status: DISCONTINUED | OUTPATIENT
Start: 2021-01-01 | End: 2021-01-01 | Stop reason: SDUPTHER

## 2021-01-01 RX ORDER — ALLOPURINOL 100 MG/1
100 TABLET ORAL 2 TIMES DAILY
Status: DISCONTINUED | OUTPATIENT
Start: 2021-01-01 | End: 2021-03-28 | Stop reason: HOSPADM

## 2021-01-01 RX ORDER — ONDANSETRON 2 MG/ML
4 INJECTION INTRAMUSCULAR; INTRAVENOUS EVERY 6 HOURS PRN
Status: DISCONTINUED | OUTPATIENT
Start: 2021-01-01 | End: 2021-01-01

## 2021-01-01 RX ORDER — METOPROLOL TARTRATE 5 MG/5ML
5 INJECTION INTRAVENOUS ONCE
Status: DISCONTINUED | OUTPATIENT
Start: 2021-01-01 | End: 2021-01-01

## 2021-01-01 RX ORDER — MIDAZOLAM HYDROCHLORIDE 2 MG/2ML
4 INJECTION, SOLUTION INTRAMUSCULAR; INTRAVENOUS ONCE
Status: DISCONTINUED | OUTPATIENT
Start: 2021-01-01 | End: 2021-03-28 | Stop reason: HOSPADM

## 2021-01-01 RX ORDER — METOPROLOL SUCCINATE 50 MG/1
75 TABLET, EXTENDED RELEASE ORAL DAILY
COMMUNITY

## 2021-01-01 RX ORDER — FUROSEMIDE 10 MG/ML
20 INJECTION INTRAMUSCULAR; INTRAVENOUS ONCE
Status: COMPLETED | OUTPATIENT
Start: 2021-01-01 | End: 2021-01-01

## 2021-01-01 RX ORDER — CALCIUM GLUCONATE 94 MG/ML
1000 INJECTION, SOLUTION INTRAVENOUS ONCE
Status: COMPLETED | OUTPATIENT
Start: 2021-01-01 | End: 2021-01-01

## 2021-01-01 RX ORDER — DIGOXIN 0.25 MG/ML
62.5 INJECTION INTRAMUSCULAR; INTRAVENOUS DAILY
Status: DISCONTINUED | OUTPATIENT
Start: 2021-01-01 | End: 2021-03-28 | Stop reason: HOSPADM

## 2021-01-01 RX ORDER — METOPROLOL SUCCINATE 25 MG/1
25 TABLET, EXTENDED RELEASE ORAL 2 TIMES DAILY
Status: DISCONTINUED | OUTPATIENT
Start: 2021-01-01 | End: 2021-03-28 | Stop reason: HOSPADM

## 2021-01-01 RX ORDER — BUDESONIDE 0.5 MG/2ML
0.5 INHALANT ORAL 2 TIMES DAILY
Status: DISCONTINUED | OUTPATIENT
Start: 2021-01-01 | End: 2021-03-28 | Stop reason: HOSPADM

## 2021-01-01 RX ORDER — DEXTROSE MONOHYDRATE 25 G/50ML
12.5 INJECTION, SOLUTION INTRAVENOUS PRN
Status: DISCONTINUED | OUTPATIENT
Start: 2021-01-01 | End: 2021-03-28 | Stop reason: HOSPADM

## 2021-01-01 RX ORDER — DEXTROSE AND SODIUM CHLORIDE 5; .9 G/100ML; G/100ML
INJECTION, SOLUTION INTRAVENOUS CONTINUOUS
Status: DISCONTINUED | OUTPATIENT
Start: 2021-01-01 | End: 2021-03-28 | Stop reason: HOSPADM

## 2021-01-01 RX ORDER — ACETAMINOPHEN 650 MG/1
650 SUPPOSITORY RECTAL EVERY 6 HOURS PRN
Status: DISCONTINUED | OUTPATIENT
Start: 2021-01-01 | End: 2021-03-28 | Stop reason: HOSPADM

## 2021-01-01 RX ORDER — WARFARIN SODIUM 2.5 MG/1
2.5 TABLET ORAL DAILY
Status: DISCONTINUED | OUTPATIENT
Start: 2021-01-01 | End: 2021-01-01

## 2021-01-01 RX ORDER — ACETAMINOPHEN 325 MG/1
650 TABLET ORAL EVERY 6 HOURS PRN
Status: DISCONTINUED | OUTPATIENT
Start: 2021-01-01 | End: 2021-03-28 | Stop reason: HOSPADM

## 2021-01-01 RX ORDER — FENTANYL CITRATE 50 UG/ML
25 INJECTION, SOLUTION INTRAMUSCULAR; INTRAVENOUS
Status: DISCONTINUED | OUTPATIENT
Start: 2021-01-01 | End: 2021-03-28 | Stop reason: HOSPADM

## 2021-01-01 RX ORDER — METOPROLOL TARTRATE 5 MG/5ML
5 INJECTION INTRAVENOUS ONCE
Status: COMPLETED | OUTPATIENT
Start: 2021-01-01 | End: 2021-01-01

## 2021-01-01 RX ORDER — PRAVASTATIN SODIUM 20 MG
20 TABLET ORAL DAILY
Status: DISCONTINUED | OUTPATIENT
Start: 2021-01-01 | End: 2021-03-28 | Stop reason: HOSPADM

## 2021-01-01 RX ORDER — ANTICOAGULANT SODIUM CITRATE SOLUTION 4 G/100ML
3 SOLUTION INTRAVENOUS
Status: ACTIVE | OUTPATIENT
Start: 2021-01-01 | End: 2021-01-01

## 2021-01-01 RX ORDER — LACTULOSE 10 G/15ML
20 SOLUTION ORAL 3 TIMES DAILY
Status: DISCONTINUED | OUTPATIENT
Start: 2021-01-01 | End: 2021-01-01

## 2021-01-01 RX ORDER — ASPIRIN 81 MG/1
81 TABLET, CHEWABLE ORAL DAILY
Status: DISCONTINUED | OUTPATIENT
Start: 2021-01-01 | End: 2021-03-28 | Stop reason: HOSPADM

## 2021-01-01 RX ORDER — HEPARIN SODIUM 1000 [USP'U]/ML
60 INJECTION, SOLUTION INTRAVENOUS; SUBCUTANEOUS ONCE
Status: DISCONTINUED | OUTPATIENT
Start: 2021-01-01 | End: 2021-03-28 | Stop reason: HOSPADM

## 2021-01-01 RX ORDER — SODIUM CHLORIDE 9 MG/ML
INJECTION, SOLUTION INTRAVENOUS CONTINUOUS
Status: DISCONTINUED | OUTPATIENT
Start: 2021-01-01 | End: 2021-03-28 | Stop reason: HOSPADM

## 2021-01-01 RX ORDER — LACTULOSE 10 G/15ML
20 SOLUTION ORAL 3 TIMES DAILY
Status: DISPENSED | OUTPATIENT
Start: 2021-01-01 | End: 2021-01-01

## 2021-01-01 RX ORDER — ETOMIDATE 2 MG/ML
INJECTION INTRAVENOUS
Status: DISPENSED
Start: 2021-01-01 | End: 2021-01-01

## 2021-01-01 RX ORDER — METOPROLOL SUCCINATE 50 MG/1
25 TABLET, EXTENDED RELEASE ORAL DAILY
Status: DISCONTINUED | OUTPATIENT
Start: 2021-01-01 | End: 2021-01-01

## 2021-01-01 RX ORDER — LIDOCAINE HYDROCHLORIDE 10 MG/ML
INJECTION, SOLUTION INFILTRATION; PERINEURAL
Status: COMPLETED
Start: 2021-01-01 | End: 2021-01-01

## 2021-01-01 RX ORDER — EPINEPHRINE 1 MG/ML
INJECTION, SOLUTION, CONCENTRATE INTRAVENOUS
Status: COMPLETED
Start: 2021-01-01 | End: 2021-01-01

## 2021-01-01 RX ORDER — FINASTERIDE 5 MG/1
5 TABLET, FILM COATED ORAL DAILY
COMMUNITY

## 2021-01-01 RX ORDER — ANTICOAGULANT SODIUM CITRATE SOLUTION 4 G/100ML
SOLUTION INTRAVENOUS CONTINUOUS
Status: DISCONTINUED | OUTPATIENT
Start: 2021-01-01 | End: 2021-01-01

## 2021-01-01 RX ORDER — ALBUMIN (HUMAN) 12.5 G/50ML
25 SOLUTION INTRAVENOUS EVERY 8 HOURS
Status: COMPLETED | OUTPATIENT
Start: 2021-01-01 | End: 2021-01-01

## 2021-01-01 RX ORDER — LEVOTHYROXINE SODIUM 0.12 MG/1
125 TABLET ORAL DAILY
Status: DISCONTINUED | OUTPATIENT
Start: 2021-01-01 | End: 2021-03-28 | Stop reason: HOSPADM

## 2021-01-01 RX ORDER — FERROUS SULFATE 325(65) MG
325 TABLET ORAL
Status: DISCONTINUED | OUTPATIENT
Start: 2021-01-01 | End: 2021-03-28 | Stop reason: HOSPADM

## 2021-01-01 RX ORDER — ETOMIDATE 2 MG/ML
30 INJECTION INTRAVENOUS ONCE
Status: COMPLETED | OUTPATIENT
Start: 2021-01-01 | End: 2021-01-01

## 2021-01-01 RX ORDER — SODIUM CHLORIDE 0.9 % (FLUSH) 0.9 %
10 SYRINGE (ML) INJECTION EVERY 12 HOURS SCHEDULED
Status: DISCONTINUED | OUTPATIENT
Start: 2021-01-01 | End: 2021-03-28 | Stop reason: HOSPADM

## 2021-01-01 RX ORDER — ROCURONIUM BROMIDE 10 MG/ML
INJECTION, SOLUTION INTRAVENOUS
Status: DISPENSED
Start: 2021-01-01 | End: 2021-01-01

## 2021-01-01 RX ORDER — HEPARIN SODIUM 1000 [USP'U]/ML
30 INJECTION, SOLUTION INTRAVENOUS; SUBCUTANEOUS PRN
Status: DISCONTINUED | OUTPATIENT
Start: 2021-01-01 | End: 2021-03-28 | Stop reason: HOSPADM

## 2021-01-01 RX ORDER — MAGNESIUM SULFATE 1 G/100ML
1000 INJECTION INTRAVENOUS PRN
Status: DISCONTINUED | OUTPATIENT
Start: 2021-01-01 | End: 2021-03-28 | Stop reason: HOSPADM

## 2021-01-01 RX ORDER — METOPROLOL TARTRATE 5 MG/5ML
INJECTION INTRAVENOUS
Status: DISPENSED
Start: 2021-01-01 | End: 2021-01-01

## 2021-01-01 RX ORDER — DEXTROSE MONOHYDRATE 50 MG/ML
100 INJECTION, SOLUTION INTRAVENOUS PRN
Status: DISCONTINUED | OUTPATIENT
Start: 2021-01-01 | End: 2021-03-28 | Stop reason: HOSPADM

## 2021-01-01 RX ORDER — ROCURONIUM BROMIDE 10 MG/ML
100 INJECTION, SOLUTION INTRAVENOUS ONCE
Status: COMPLETED | OUTPATIENT
Start: 2021-01-01 | End: 2021-01-01

## 2021-01-01 RX ADMIN — PIPERACILLIN AND TAZOBACTAM 3375 MG: 3; .375 INJECTION, POWDER, LYOPHILIZED, FOR SOLUTION INTRAVENOUS at 08:16

## 2021-01-01 RX ADMIN — ACETAMINOPHEN 650 MG: 325 TABLET ORAL at 12:11

## 2021-01-01 RX ADMIN — DIGOXIN 62.5 MCG: 0.25 INJECTION INTRAMUSCULAR; INTRAVENOUS at 08:32

## 2021-01-01 RX ADMIN — WARFARIN SODIUM 2 MG: 2 TABLET ORAL at 00:18

## 2021-01-01 RX ADMIN — INSULIN HUMAN 10 UNITS: 100 INJECTION, SOLUTION PARENTERAL at 01:18

## 2021-01-01 RX ADMIN — Medication 10 ML: at 08:51

## 2021-01-01 RX ADMIN — ALLOPURINOL 100 MG: 100 TABLET ORAL at 21:26

## 2021-01-01 RX ADMIN — Medication 10 ML: at 21:44

## 2021-01-01 RX ADMIN — ARFORMOTEROL TARTRATE 15 MCG: 15 SOLUTION RESPIRATORY (INHALATION) at 09:17

## 2021-01-01 RX ADMIN — HYDROCORTISONE SODIUM SUCCINATE 100 MG: 100 INJECTION, POWDER, FOR SOLUTION INTRAMUSCULAR; INTRAVENOUS at 04:06

## 2021-01-01 RX ADMIN — IPRATROPIUM BROMIDE AND ALBUTEROL SULFATE 1 AMPULE: 2.5; .5 SOLUTION RESPIRATORY (INHALATION) at 20:39

## 2021-01-01 RX ADMIN — FINASTERIDE 5 MG: 5 TABLET, FILM COATED ORAL at 10:07

## 2021-01-01 RX ADMIN — CHLORHEXIDINE GLUCONATE 15 ML: 1.2 RINSE ORAL at 08:41

## 2021-01-01 RX ADMIN — METOPROLOL TARTRATE 5 MG: 5 INJECTION INTRAVENOUS at 03:49

## 2021-01-01 RX ADMIN — METOPROLOL SUCCINATE 25 MG: 50 TABLET, EXTENDED RELEASE ORAL at 14:54

## 2021-01-01 RX ADMIN — HEPARIN SODIUM 12 UNITS/KG/HR: 10000 INJECTION, SOLUTION INTRAVENOUS at 17:26

## 2021-01-01 RX ADMIN — Medication 14 MCG/MIN: at 09:24

## 2021-01-01 RX ADMIN — IPRATROPIUM BROMIDE AND ALBUTEROL SULFATE 1 AMPULE: 2.5; .5 SOLUTION RESPIRATORY (INHALATION) at 14:53

## 2021-01-01 RX ADMIN — ACETAMINOPHEN 650 MG: 325 TABLET ORAL at 15:52

## 2021-01-01 RX ADMIN — DEXTROSE MONOHYDRATE 12.5 G: 25 INJECTION, SOLUTION INTRAVENOUS at 07:17

## 2021-01-01 RX ADMIN — LIDOCAINE HYDROCHLORIDE 200 MG: 10 INJECTION, SOLUTION INFILTRATION; PERINEURAL at 21:12

## 2021-01-01 RX ADMIN — DIGOXIN 250 MCG: 0.25 INJECTION INTRAMUSCULAR; INTRAVENOUS at 04:21

## 2021-01-01 RX ADMIN — ALLOPURINOL 100 MG: 100 TABLET ORAL at 10:07

## 2021-01-01 RX ADMIN — DIGOXIN 250 MCG: 0.25 INJECTION INTRAMUSCULAR; INTRAVENOUS at 00:02

## 2021-01-01 RX ADMIN — ARFORMOTEROL TARTRATE 15 MCG: 15 SOLUTION RESPIRATORY (INHALATION) at 08:21

## 2021-01-01 RX ADMIN — LEVOTHYROXINE SODIUM 125 MCG: 0.12 TABLET ORAL at 10:07

## 2021-01-01 RX ADMIN — Medication 10 ML: at 21:12

## 2021-01-01 RX ADMIN — ARFORMOTEROL TARTRATE 15 MCG: 15 SOLUTION RESPIRATORY (INHALATION) at 12:09

## 2021-01-01 RX ADMIN — DEXTROSE MONOHYDRATE 25 G: 25 INJECTION, SOLUTION INTRAVENOUS at 01:18

## 2021-01-01 RX ADMIN — VASOPRESSIN 0.03 UNITS/MIN: 20 INJECTION INTRAVENOUS at 16:43

## 2021-01-01 RX ADMIN — HYDROCORTISONE SODIUM SUCCINATE 100 MG: 100 INJECTION, POWDER, FOR SOLUTION INTRAMUSCULAR; INTRAVENOUS at 04:38

## 2021-01-01 RX ADMIN — WARFARIN SODIUM 2 MG: 2 TABLET ORAL at 16:06

## 2021-01-01 RX ADMIN — HYDROCORTISONE SODIUM SUCCINATE 100 MG: 100 INJECTION, POWDER, FOR SOLUTION INTRAMUSCULAR; INTRAVENOUS at 11:57

## 2021-01-01 RX ADMIN — BUDESONIDE 500 MCG: 0.5 SUSPENSION RESPIRATORY (INHALATION) at 09:50

## 2021-01-01 RX ADMIN — ASPIRIN 81 MG CHEWABLE TABLET 324 MG: 81 TABLET CHEWABLE at 15:07

## 2021-01-01 RX ADMIN — FERROUS SULFATE TAB 325 MG (65 MG ELEMENTAL FE) 325 MG: 325 (65 FE) TAB at 07:48

## 2021-01-01 RX ADMIN — Medication 10 ML: at 08:48

## 2021-01-01 RX ADMIN — MIDAZOLAM HYDROCHLORIDE 4 MG: 2 INJECTION, SOLUTION INTRAMUSCULAR; INTRAVENOUS at 16:10

## 2021-01-01 RX ADMIN — HYDROCORTISONE SODIUM SUCCINATE 100 MG: 100 INJECTION, POWDER, FOR SOLUTION INTRAMUSCULAR; INTRAVENOUS at 20:16

## 2021-01-01 RX ADMIN — Medication 10 ML: at 20:21

## 2021-01-01 RX ADMIN — LEVOTHYROXINE SODIUM 125 MCG: 0.12 TABLET ORAL at 11:57

## 2021-01-01 RX ADMIN — Medication 10 ML: at 10:08

## 2021-01-01 RX ADMIN — Medication 10 MCG/MIN: at 00:51

## 2021-01-01 RX ADMIN — ARFORMOTEROL TARTRATE 15 MCG: 15 SOLUTION RESPIRATORY (INHALATION) at 09:30

## 2021-01-01 RX ADMIN — Medication 10 ML: at 20:04

## 2021-01-01 RX ADMIN — SODIUM CHLORIDE, PRESERVATIVE FREE 10 ML: 5 INJECTION INTRAVENOUS at 16:00

## 2021-01-01 RX ADMIN — ALBUMIN (HUMAN) 25 G: 0.25 INJECTION, SOLUTION INTRAVENOUS at 05:05

## 2021-01-01 RX ADMIN — FERROUS SULFATE TAB 325 MG (65 MG ELEMENTAL FE) 325 MG: 325 (65 FE) TAB at 11:58

## 2021-01-01 RX ADMIN — FERROUS SULFATE TAB 325 MG (65 MG ELEMENTAL FE) 325 MG: 325 (65 FE) TAB at 08:50

## 2021-01-01 RX ADMIN — PRAVASTATIN SODIUM 20 MG: 20 TABLET ORAL at 11:03

## 2021-01-01 RX ADMIN — FINASTERIDE 5 MG: 5 TABLET, FILM COATED ORAL at 22:43

## 2021-01-01 RX ADMIN — EPINEPHRINE: 1 INJECTION, SOLUTION INTRAMUSCULAR; SUBCUTANEOUS at 20:00

## 2021-01-01 RX ADMIN — BUDESONIDE 500 MCG: 0.5 SUSPENSION RESPIRATORY (INHALATION) at 21:22

## 2021-01-01 RX ADMIN — PIPERACILLIN AND TAZOBACTAM 3375 MG: 3; .375 INJECTION, POWDER, LYOPHILIZED, FOR SOLUTION INTRAVENOUS at 08:42

## 2021-01-01 RX ADMIN — IPRATROPIUM BROMIDE AND ALBUTEROL SULFATE 1 AMPULE: 2.5; .5 SOLUTION RESPIRATORY (INHALATION) at 16:39

## 2021-01-01 RX ADMIN — IPRATROPIUM BROMIDE AND ALBUTEROL SULFATE 1 AMPULE: 2.5; .5 SOLUTION RESPIRATORY (INHALATION) at 08:22

## 2021-01-01 RX ADMIN — Medication 10 ML: at 22:04

## 2021-01-01 RX ADMIN — ALBUMIN (HUMAN) 25 G: 0.25 INJECTION, SOLUTION INTRAVENOUS at 13:22

## 2021-01-01 RX ADMIN — PIPERACILLIN AND TAZOBACTAM 3375 MG: 3; .375 INJECTION, POWDER, LYOPHILIZED, FOR SOLUTION INTRAVENOUS at 01:02

## 2021-01-01 RX ADMIN — Medication 10 ML: at 10:22

## 2021-01-01 RX ADMIN — CEFTRIAXONE SODIUM 1000 MG: 1 INJECTION, POWDER, FOR SOLUTION INTRAMUSCULAR; INTRAVENOUS at 07:47

## 2021-01-01 RX ADMIN — DEXTROSE MONOHYDRATE 12.5 G: 25 INJECTION, SOLUTION INTRAVENOUS at 09:09

## 2021-01-01 RX ADMIN — MIDAZOLAM 4 MG: 1 INJECTION INTRAMUSCULAR; INTRAVENOUS at 11:36

## 2021-01-01 RX ADMIN — DEXTROSE AND SODIUM CHLORIDE: 5; 900 INJECTION, SOLUTION INTRAVENOUS at 13:23

## 2021-01-01 RX ADMIN — DIGOXIN 62.5 MCG: 0.25 INJECTION INTRAMUSCULAR; INTRAVENOUS at 05:58

## 2021-01-01 RX ADMIN — ACETAMINOPHEN 650 MG: 325 TABLET ORAL at 01:50

## 2021-01-01 RX ADMIN — ARFORMOTEROL TARTRATE 15 MCG: 15 SOLUTION RESPIRATORY (INHALATION) at 20:23

## 2021-01-01 RX ADMIN — FENTANYL CITRATE 25 MCG: 50 INJECTION, SOLUTION INTRAMUSCULAR; INTRAVENOUS at 21:43

## 2021-01-01 RX ADMIN — AMIODARONE HYDROCHLORIDE 1 MG/MIN: 50 INJECTION, SOLUTION INTRAVENOUS at 20:30

## 2021-01-01 RX ADMIN — IPRATROPIUM BROMIDE AND ALBUTEROL SULFATE 1 AMPULE: 2.5; .5 SOLUTION RESPIRATORY (INHALATION) at 11:53

## 2021-01-01 RX ADMIN — CEFTRIAXONE SODIUM 1000 MG: 1 INJECTION, POWDER, FOR SOLUTION INTRAMUSCULAR; INTRAVENOUS at 08:51

## 2021-01-01 RX ADMIN — IPRATROPIUM BROMIDE AND ALBUTEROL SULFATE 1 AMPULE: 2.5; .5 SOLUTION RESPIRATORY (INHALATION) at 17:55

## 2021-01-01 RX ADMIN — CHLORHEXIDINE GLUCONATE 15 ML: 1.2 RINSE ORAL at 08:12

## 2021-01-01 RX ADMIN — SODIUM CHLORIDE: 9 INJECTION, SOLUTION INTRAVENOUS at 18:06

## 2021-01-01 RX ADMIN — IPRATROPIUM BROMIDE AND ALBUTEROL SULFATE 1 AMPULE: 2.5; .5 SOLUTION RESPIRATORY (INHALATION) at 16:33

## 2021-01-01 RX ADMIN — CEFTRIAXONE SODIUM 1000 MG: 1 INJECTION, POWDER, FOR SOLUTION INTRAMUSCULAR; INTRAVENOUS at 10:37

## 2021-01-01 RX ADMIN — IPRATROPIUM BROMIDE AND ALBUTEROL SULFATE 1 AMPULE: 2.5; .5 SOLUTION RESPIRATORY (INHALATION) at 09:10

## 2021-01-01 RX ADMIN — IPRATROPIUM BROMIDE AND ALBUTEROL SULFATE 1 AMPULE: 2.5; .5 SOLUTION RESPIRATORY (INHALATION) at 13:16

## 2021-01-01 RX ADMIN — BUDESONIDE 500 MCG: 0.5 SUSPENSION RESPIRATORY (INHALATION) at 09:09

## 2021-01-01 RX ADMIN — ISOSORBIDE MONONITRATE 30 MG: 30 TABLET, EXTENDED RELEASE ORAL at 22:42

## 2021-01-01 RX ADMIN — Medication 10 ML: at 20:17

## 2021-01-01 RX ADMIN — Medication 10 ML: at 22:44

## 2021-01-01 RX ADMIN — ALBUMIN (HUMAN) 25 G: 0.25 INJECTION, SOLUTION INTRAVENOUS at 20:16

## 2021-01-01 RX ADMIN — ALLOPURINOL 100 MG: 100 TABLET ORAL at 07:48

## 2021-01-01 RX ADMIN — ANTICOAGULANT SODIUM CITRATE SOLUTION: 4 SOLUTION INTRAVENOUS at 16:59

## 2021-01-01 RX ADMIN — VASOPRESSIN 0.03 UNITS/MIN: 20 INJECTION INTRAVENOUS at 11:56

## 2021-01-01 RX ADMIN — CEFTRIAXONE SODIUM 1000 MG: 1 INJECTION, POWDER, FOR SOLUTION INTRAMUSCULAR; INTRAVENOUS at 11:58

## 2021-01-01 RX ADMIN — PIPERACILLIN AND TAZOBACTAM 3375 MG: 3; .375 INJECTION, POWDER, LYOPHILIZED, FOR SOLUTION INTRAVENOUS at 16:24

## 2021-01-01 RX ADMIN — IPRATROPIUM BROMIDE AND ALBUTEROL SULFATE 1 AMPULE: 2.5; .5 SOLUTION RESPIRATORY (INHALATION) at 16:53

## 2021-01-01 RX ADMIN — PHENYLEPHRINE HYDROCHLORIDE 10 MG: 10 INJECTION INTRAVENOUS at 11:58

## 2021-01-01 RX ADMIN — Medication 75 MCG/HR: at 11:22

## 2021-01-01 RX ADMIN — PHENYLEPHRINE HYDROCHLORIDE 30 MCG/MIN: 10 INJECTION INTRAVENOUS at 23:11

## 2021-01-01 RX ADMIN — DIGOXIN 250 MCG: 0.25 INJECTION INTRAMUSCULAR; INTRAVENOUS at 16:32

## 2021-01-01 RX ADMIN — SODIUM BICARBONATE 50 MEQ: 84 INJECTION, SOLUTION INTRAVENOUS at 05:44

## 2021-01-01 RX ADMIN — DIGOXIN 62.5 MCG: 0.25 INJECTION INTRAMUSCULAR; INTRAVENOUS at 07:48

## 2021-01-01 RX ADMIN — IPRATROPIUM BROMIDE AND ALBUTEROL SULFATE 1 AMPULE: 2.5; .5 SOLUTION RESPIRATORY (INHALATION) at 15:46

## 2021-01-01 RX ADMIN — ALLOPURINOL 100 MG: 100 TABLET ORAL at 21:20

## 2021-01-01 RX ADMIN — PANTOPRAZOLE SODIUM 40 MG: 40 TABLET, DELAYED RELEASE ORAL at 05:44

## 2021-01-01 RX ADMIN — DEXTROSE MONOHYDRATE 12.5 G: 25 INJECTION, SOLUTION INTRAVENOUS at 03:28

## 2021-01-01 RX ADMIN — ARFORMOTEROL TARTRATE 15 MCG: 15 SOLUTION RESPIRATORY (INHALATION) at 09:50

## 2021-01-01 RX ADMIN — BUMETANIDE 1 MG: 0.25 INJECTION, SOLUTION INTRAMUSCULAR; INTRAVENOUS at 07:47

## 2021-01-01 RX ADMIN — Medication 10 ML: at 13:31

## 2021-01-01 RX ADMIN — INSULIN HUMAN 10 UNITS: 100 INJECTION, SOLUTION PARENTERAL at 05:44

## 2021-01-01 RX ADMIN — INSULIN HUMAN 10 UNITS: 100 INJECTION, SOLUTION PARENTERAL at 00:52

## 2021-01-01 RX ADMIN — MIDAZOLAM HYDROCHLORIDE 2 MG: 2 INJECTION, SOLUTION INTRAMUSCULAR; INTRAVENOUS at 11:27

## 2021-01-01 RX ADMIN — IPRATROPIUM BROMIDE AND ALBUTEROL SULFATE 1 AMPULE: 2.5; .5 SOLUTION RESPIRATORY (INHALATION) at 12:36

## 2021-01-01 RX ADMIN — ASPIRIN 81 MG CHEWABLE TABLET 81 MG: 81 TABLET CHEWABLE at 08:11

## 2021-01-01 RX ADMIN — DIGOXIN 62.5 MCG: 0.25 INJECTION INTRAMUSCULAR; INTRAVENOUS at 21:48

## 2021-01-01 RX ADMIN — BUDESONIDE 500 MCG: 0.5 SUSPENSION RESPIRATORY (INHALATION) at 09:30

## 2021-01-01 RX ADMIN — MIDAZOLAM 4 MG: 1 INJECTION INTRAMUSCULAR; INTRAVENOUS at 16:10

## 2021-01-01 RX ADMIN — Medication 100 MCG/HR: at 11:34

## 2021-01-01 RX ADMIN — IPRATROPIUM BROMIDE AND ALBUTEROL SULFATE 1 AMPULE: 2.5; .5 SOLUTION RESPIRATORY (INHALATION) at 21:18

## 2021-01-01 RX ADMIN — FERROUS SULFATE TAB 325 MG (65 MG ELEMENTAL FE) 325 MG: 325 (65 FE) TAB at 10:07

## 2021-01-01 RX ADMIN — LEVOTHYROXINE SODIUM 125 MCG: 0.12 TABLET ORAL at 08:42

## 2021-01-01 RX ADMIN — CHLORHEXIDINE GLUCONATE 15 ML: 1.2 RINSE ORAL at 20:45

## 2021-01-01 RX ADMIN — ACETAMINOPHEN 650 MG: 325 TABLET ORAL at 10:06

## 2021-01-01 RX ADMIN — SODIUM CHLORIDE, PRESERVATIVE FREE 10 ML: 5 INJECTION INTRAVENOUS at 09:00

## 2021-01-01 RX ADMIN — BUMETANIDE 1 MG: 0.25 INJECTION, SOLUTION INTRAMUSCULAR; INTRAVENOUS at 16:00

## 2021-01-01 RX ADMIN — IPRATROPIUM BROMIDE AND ALBUTEROL SULFATE 1 AMPULE: 2.5; .5 SOLUTION RESPIRATORY (INHALATION) at 11:41

## 2021-01-01 RX ADMIN — Medication 50 MCG/MIN: at 11:15

## 2021-01-01 RX ADMIN — SODIUM BICARBONATE: 84 INJECTION, SOLUTION INTRAVENOUS at 23:01

## 2021-01-01 RX ADMIN — LEVOTHYROXINE SODIUM 125 MCG: 0.12 TABLET ORAL at 16:05

## 2021-01-01 RX ADMIN — DEXTROSE MONOHYDRATE 25 G: 25 INJECTION, SOLUTION INTRAVENOUS at 17:04

## 2021-01-01 RX ADMIN — ASPIRIN 81 MG CHEWABLE TABLET 81 MG: 81 TABLET CHEWABLE at 08:41

## 2021-01-01 RX ADMIN — Medication 50 MCG/MIN: at 20:46

## 2021-01-01 RX ADMIN — PRAVASTATIN SODIUM 20 MG: 20 TABLET ORAL at 11:59

## 2021-01-01 RX ADMIN — DOPAMINE HYDROCHLORIDE 20 MCG/KG/MIN: 320 INJECTION, SOLUTION INTRAVENOUS at 20:48

## 2021-01-01 RX ADMIN — METOPROLOL SUCCINATE 75 MG: 50 TABLET, EXTENDED RELEASE ORAL at 22:42

## 2021-01-01 RX ADMIN — ARFORMOTEROL TARTRATE 15 MCG: 15 SOLUTION RESPIRATORY (INHALATION) at 08:22

## 2021-01-01 RX ADMIN — ALLOPURINOL 100 MG: 100 TABLET ORAL at 21:12

## 2021-01-01 RX ADMIN — LEVOTHYROXINE SODIUM 125 MCG: 0.12 TABLET ORAL at 22:43

## 2021-01-01 RX ADMIN — DEXTROSE MONOHYDRATE 25 G: 25 INJECTION, SOLUTION INTRAVENOUS at 00:52

## 2021-01-01 RX ADMIN — METOPROLOL TARTRATE 2.5 MG: 1 INJECTION, SOLUTION INTRAVENOUS at 01:14

## 2021-01-01 RX ADMIN — BUMETANIDE 1 MG/HR: 0.25 INJECTION INTRAMUSCULAR; INTRAVENOUS at 22:04

## 2021-01-01 RX ADMIN — BUDESONIDE 500 MCG: 0.5 SUSPENSION RESPIRATORY (INHALATION) at 20:57

## 2021-01-01 RX ADMIN — MIDAZOLAM 2 MG: 1 INJECTION INTRAMUSCULAR; INTRAVENOUS at 11:27

## 2021-01-01 RX ADMIN — ALLOPURINOL 100 MG: 100 TABLET ORAL at 11:57

## 2021-01-01 RX ADMIN — Medication 24 MCG/MIN: at 21:33

## 2021-01-01 RX ADMIN — BUDESONIDE 500 MCG: 0.5 SUSPENSION RESPIRATORY (INHALATION) at 21:23

## 2021-01-01 RX ADMIN — IPRATROPIUM BROMIDE AND ALBUTEROL SULFATE 1 AMPULE: 2.5; .5 SOLUTION RESPIRATORY (INHALATION) at 08:21

## 2021-01-01 RX ADMIN — DEXTROSE MONOHYDRATE 25 G: 25 INJECTION, SOLUTION INTRAVENOUS at 03:22

## 2021-01-01 RX ADMIN — FUROSEMIDE 20 MG: 10 INJECTION, SOLUTION INTRAMUSCULAR; INTRAVENOUS at 23:18

## 2021-01-01 RX ADMIN — HYDROCORTISONE SODIUM SUCCINATE 100 MG: 100 INJECTION, POWDER, FOR SOLUTION INTRAMUSCULAR; INTRAVENOUS at 20:45

## 2021-01-01 RX ADMIN — Medication: at 16:37

## 2021-01-01 RX ADMIN — Medication 50 MCG/HR: at 14:58

## 2021-01-01 RX ADMIN — INSULIN HUMAN 5 UNITS: 100 INJECTION, SOLUTION PARENTERAL at 17:04

## 2021-01-01 RX ADMIN — PRAVASTATIN SODIUM 20 MG: 20 TABLET ORAL at 22:43

## 2021-01-01 RX ADMIN — ASPIRIN 81 MG CHEWABLE TABLET 81 MG: 81 TABLET CHEWABLE at 11:57

## 2021-01-01 RX ADMIN — ASPIRIN 81 MG CHEWABLE TABLET 81 MG: 81 TABLET CHEWABLE at 10:07

## 2021-01-01 RX ADMIN — IPRATROPIUM BROMIDE AND ALBUTEROL SULFATE 1 AMPULE: 2.5; .5 SOLUTION RESPIRATORY (INHALATION) at 12:08

## 2021-01-01 RX ADMIN — Medication 10 ML: at 21:20

## 2021-01-01 RX ADMIN — ALBUMIN (HUMAN) 25 G: 0.25 INJECTION, SOLUTION INTRAVENOUS at 20:45

## 2021-01-01 RX ADMIN — ARFORMOTEROL TARTRATE 15 MCG: 15 SOLUTION RESPIRATORY (INHALATION) at 19:38

## 2021-01-01 RX ADMIN — IPRATROPIUM BROMIDE AND ALBUTEROL SULFATE 1 AMPULE: 2.5; .5 SOLUTION RESPIRATORY (INHALATION) at 08:16

## 2021-01-01 RX ADMIN — CALCIUM GLUCONATE 1000 MG: 98 INJECTION, SOLUTION INTRAVENOUS at 01:18

## 2021-01-01 RX ADMIN — SODIUM CHLORIDE, PRESERVATIVE FREE 10 ML: 5 INJECTION INTRAVENOUS at 09:09

## 2021-01-01 RX ADMIN — PIPERACILLIN AND TAZOBACTAM 3375 MG: 3; .375 INJECTION, POWDER, LYOPHILIZED, FOR SOLUTION INTRAVENOUS at 16:43

## 2021-01-01 RX ADMIN — Medication 1 MCG/MIN: at 06:06

## 2021-01-01 RX ADMIN — PIPERACILLIN AND TAZOBACTAM 3375 MG: 3; .375 INJECTION, POWDER, LYOPHILIZED, FOR SOLUTION INTRAVENOUS at 00:56

## 2021-01-01 RX ADMIN — FERROUS SULFATE TAB 325 MG (65 MG ELEMENTAL FE) 325 MG: 325 (65 FE) TAB at 08:42

## 2021-01-01 RX ADMIN — BUDESONIDE 500 MCG: 0.5 SUSPENSION RESPIRATORY (INHALATION) at 09:17

## 2021-01-01 RX ADMIN — METOPROLOL TARTRATE 2.5 MG: 5 INJECTION INTRAVENOUS at 22:46

## 2021-01-01 RX ADMIN — ALLOPURINOL 100 MG: 100 TABLET ORAL at 00:18

## 2021-01-01 RX ADMIN — ALLOPURINOL 100 MG: 100 TABLET ORAL at 20:44

## 2021-01-01 RX ADMIN — ACETAMINOPHEN 650 MG: 325 TABLET ORAL at 20:44

## 2021-01-01 RX ADMIN — BUDESONIDE 500 MCG: 0.5 SUSPENSION RESPIRATORY (INHALATION) at 08:21

## 2021-01-01 RX ADMIN — ARFORMOTEROL TARTRATE 15 MCG: 15 SOLUTION RESPIRATORY (INHALATION) at 20:39

## 2021-01-01 RX ADMIN — ARFORMOTEROL TARTRATE 15 MCG: 15 SOLUTION RESPIRATORY (INHALATION) at 21:17

## 2021-01-01 RX ADMIN — FINASTERIDE 5 MG: 5 TABLET, FILM COATED ORAL at 07:47

## 2021-01-01 RX ADMIN — IPRATROPIUM BROMIDE AND ALBUTEROL SULFATE 1 AMPULE: 2.5; .5 SOLUTION RESPIRATORY (INHALATION) at 15:36

## 2021-01-01 RX ADMIN — ASPIRIN 81 MG CHEWABLE TABLET 81 MG: 81 TABLET CHEWABLE at 16:05

## 2021-01-01 RX ADMIN — PIPERACILLIN AND TAZOBACTAM 3375 MG: 3; .375 INJECTION, POWDER, LYOPHILIZED, FOR SOLUTION INTRAVENOUS at 17:12

## 2021-01-01 RX ADMIN — METOPROLOL TARTRATE 2.5 MG: 5 INJECTION INTRAVENOUS at 00:39

## 2021-01-01 RX ADMIN — SODIUM BICARBONATE 50 MEQ: 84 INJECTION, SOLUTION INTRAVENOUS at 23:37

## 2021-01-01 RX ADMIN — METOPROLOL TARTRATE 2.5 MG: 1 INJECTION, SOLUTION INTRAVENOUS at 22:46

## 2021-01-01 RX ADMIN — LEVOTHYROXINE SODIUM 125 MCG: 0.12 TABLET ORAL at 08:11

## 2021-01-01 RX ADMIN — ARFORMOTEROL TARTRATE 15 MCG: 15 SOLUTION RESPIRATORY (INHALATION) at 21:23

## 2021-01-01 RX ADMIN — ALLOPURINOL 100 MG: 100 TABLET ORAL at 08:11

## 2021-01-01 RX ADMIN — IPRATROPIUM BROMIDE AND ALBUTEROL SULFATE 1 AMPULE: 2.5; .5 SOLUTION RESPIRATORY (INHALATION) at 18:17

## 2021-01-01 RX ADMIN — FINASTERIDE 5 MG: 5 TABLET, FILM COATED ORAL at 12:00

## 2021-01-01 RX ADMIN — FINASTERIDE 5 MG: 5 TABLET, FILM COATED ORAL at 08:50

## 2021-01-01 RX ADMIN — SODIUM ZIRCONIUM CYCLOSILICATE 5 G: 5 POWDER, FOR SUSPENSION ORAL at 16:20

## 2021-01-01 RX ADMIN — IPRATROPIUM BROMIDE AND ALBUTEROL SULFATE 1 AMPULE: 2.5; .5 SOLUTION RESPIRATORY (INHALATION) at 13:31

## 2021-01-01 RX ADMIN — VANCOMYCIN HYDROCHLORIDE 2250 MG: 10 INJECTION, POWDER, LYOPHILIZED, FOR SOLUTION INTRAVENOUS at 01:56

## 2021-01-01 RX ADMIN — METOPROLOL TARTRATE 2.5 MG: 1 INJECTION, SOLUTION INTRAVENOUS at 00:39

## 2021-01-01 RX ADMIN — Medication 75 MCG/HR: at 20:23

## 2021-01-01 RX ADMIN — LEVOTHYROXINE SODIUM 125 MCG: 0.12 TABLET ORAL at 08:50

## 2021-01-01 RX ADMIN — BUMETANIDE 2 MG: 0.25 INJECTION, SOLUTION INTRAMUSCULAR; INTRAVENOUS at 14:36

## 2021-01-01 RX ADMIN — CHLORHEXIDINE GLUCONATE 15 ML: 1.2 RINSE ORAL at 10:07

## 2021-01-01 RX ADMIN — ACETAMINOPHEN 650 MG: 325 TABLET ORAL at 15:36

## 2021-01-01 RX ADMIN — PRAVASTATIN SODIUM 20 MG: 20 TABLET ORAL at 16:05

## 2021-01-01 RX ADMIN — FUROSEMIDE 20 MG: 10 INJECTION INTRAMUSCULAR; INTRAVENOUS at 23:18

## 2021-01-01 RX ADMIN — HYDROCORTISONE SODIUM SUCCINATE 100 MG: 100 INJECTION, POWDER, FOR SOLUTION INTRAMUSCULAR; INTRAVENOUS at 12:03

## 2021-01-01 RX ADMIN — Medication 10 ML: at 08:42

## 2021-01-01 RX ADMIN — CHLORHEXIDINE GLUCONATE 15 ML: 1.2 RINSE ORAL at 20:17

## 2021-01-01 RX ADMIN — IPRATROPIUM BROMIDE AND ALBUTEROL SULFATE 1 AMPULE: 2.5; .5 SOLUTION RESPIRATORY (INHALATION) at 20:00

## 2021-01-01 RX ADMIN — IPRATROPIUM BROMIDE AND ALBUTEROL SULFATE 1 AMPULE: 2.5; .5 SOLUTION RESPIRATORY (INHALATION) at 11:48

## 2021-01-01 RX ADMIN — Medication 75 MCG/HR: at 04:38

## 2021-01-01 RX ADMIN — VASOPRESSIN 0.03 UNITS/MIN: 20 INJECTION INTRAVENOUS at 19:43

## 2021-01-01 RX ADMIN — IPRATROPIUM BROMIDE AND ALBUTEROL SULFATE 1 AMPULE: 2.5; .5 SOLUTION RESPIRATORY (INHALATION) at 20:23

## 2021-01-01 RX ADMIN — PIPERACILLIN AND TAZOBACTAM 3375 MG: 3; .375 INJECTION, POWDER, LYOPHILIZED, FOR SOLUTION INTRAVENOUS at 01:11

## 2021-01-01 RX ADMIN — PRAVASTATIN SODIUM 20 MG: 20 TABLET ORAL at 07:48

## 2021-01-01 RX ADMIN — PHYTONADIONE 5 MG: 10 INJECTION, EMULSION INTRAMUSCULAR; INTRAVENOUS; SUBCUTANEOUS at 14:54

## 2021-01-01 RX ADMIN — VANCOMYCIN HYDROCHLORIDE 1750 MG: 10 INJECTION, POWDER, LYOPHILIZED, FOR SOLUTION INTRAVENOUS at 12:39

## 2021-01-01 RX ADMIN — FUROSEMIDE 40 MG: 10 INJECTION, SOLUTION INTRAVENOUS at 22:43

## 2021-01-01 RX ADMIN — IPRATROPIUM BROMIDE AND ALBUTEROL SULFATE 1 AMPULE: 2.5; .5 SOLUTION RESPIRATORY (INHALATION) at 19:38

## 2021-01-01 RX ADMIN — FINASTERIDE 5 MG: 5 TABLET, FILM COATED ORAL at 08:41

## 2021-01-01 RX ADMIN — FINASTERIDE 5 MG: 5 TABLET, FILM COATED ORAL at 16:05

## 2021-01-01 RX ADMIN — Medication 16 MCG/MIN: at 02:30

## 2021-01-01 RX ADMIN — VASOPRESSIN 0.03 UNITS/MIN: 20 INJECTION INTRAVENOUS at 03:35

## 2021-01-01 RX ADMIN — METOPROLOL SUCCINATE 25 MG: 25 TABLET, EXTENDED RELEASE ORAL at 21:12

## 2021-01-01 RX ADMIN — IPRATROPIUM BROMIDE AND ALBUTEROL SULFATE 1 AMPULE: 2.5; .5 SOLUTION RESPIRATORY (INHALATION) at 20:57

## 2021-01-01 RX ADMIN — FUROSEMIDE 40 MG: 10 INJECTION, SOLUTION INTRAMUSCULAR; INTRAVENOUS at 14:10

## 2021-01-01 RX ADMIN — SODIUM CHLORIDE: 9 INJECTION, SOLUTION INTRAVENOUS at 16:32

## 2021-01-01 RX ADMIN — FERROUS SULFATE TAB 325 MG (65 MG ELEMENTAL FE) 325 MG: 325 (65 FE) TAB at 08:12

## 2021-01-01 RX ADMIN — CALCIUM GLUCONATE 1000 MG: 98 INJECTION, SOLUTION INTRAVENOUS at 20:42

## 2021-01-01 RX ADMIN — ASPIRIN 81 MG CHEWABLE TABLET 81 MG: 81 TABLET CHEWABLE at 22:43

## 2021-01-01 RX ADMIN — Medication 100 MCG/HR: at 22:57

## 2021-01-01 RX ADMIN — Medication 10 ML: at 09:01

## 2021-01-01 RX ADMIN — VASOPRESSIN 0.03 UNITS/MIN: 20 INJECTION INTRAVENOUS at 14:58

## 2021-01-01 RX ADMIN — ARFORMOTEROL TARTRATE 15 MCG: 15 SOLUTION RESPIRATORY (INHALATION) at 09:10

## 2021-01-01 RX ADMIN — ALBUMIN (HUMAN) 25 G: 0.25 INJECTION, SOLUTION INTRAVENOUS at 13:09

## 2021-01-01 RX ADMIN — METOPROLOL TARTRATE 5 MG: 1 INJECTION, SOLUTION INTRAVENOUS at 03:49

## 2021-01-01 RX ADMIN — HYDROCORTISONE SODIUM SUCCINATE 100 MG: 100 INJECTION, POWDER, FOR SOLUTION INTRAMUSCULAR; INTRAVENOUS at 11:29

## 2021-01-01 RX ADMIN — IPRATROPIUM BROMIDE AND ALBUTEROL SULFATE 1 AMPULE: 2.5; .5 SOLUTION RESPIRATORY (INHALATION) at 21:22

## 2021-01-01 RX ADMIN — BUDESONIDE 500 MCG: 0.5 SUSPENSION RESPIRATORY (INHALATION) at 21:18

## 2021-01-01 RX ADMIN — ARFORMOTEROL TARTRATE 15 MCG: 15 SOLUTION RESPIRATORY (INHALATION) at 20:57

## 2021-01-01 RX ADMIN — Medication 50 MEQ: at 22:45

## 2021-01-01 RX ADMIN — CHLORHEXIDINE GLUCONATE 15 ML: 1.2 RINSE ORAL at 20:21

## 2021-01-01 RX ADMIN — VASOPRESSIN 0.03 UNITS/MIN: 20 INJECTION INTRAVENOUS at 04:38

## 2021-01-01 RX ADMIN — ROCURONIUM BROMIDE 100 MG: 50 INJECTION, SOLUTION INTRAVENOUS at 11:20

## 2021-01-01 RX ADMIN — PRAVASTATIN SODIUM 20 MG: 20 TABLET ORAL at 08:11

## 2021-01-01 RX ADMIN — FUROSEMIDE 20 MG: 10 INJECTION, SOLUTION INTRAMUSCULAR; INTRAVENOUS at 01:09

## 2021-01-01 RX ADMIN — CALCIUM GLUCONATE 1000 MG: 98 INJECTION, SOLUTION INTRAVENOUS at 00:52

## 2021-01-01 RX ADMIN — HEPARIN SODIUM 8 UNITS/KG/HR: 10000 INJECTION, SOLUTION INTRAVENOUS at 15:04

## 2021-01-01 RX ADMIN — LEVOTHYROXINE SODIUM 125 MCG: 0.12 TABLET ORAL at 07:48

## 2021-01-01 RX ADMIN — DIGOXIN 62.5 MCG: 0.25 INJECTION INTRAMUSCULAR; INTRAVENOUS at 08:50

## 2021-01-01 RX ADMIN — VASOPRESSIN 0.03 UNITS/MIN: 20 INJECTION INTRAVENOUS at 15:04

## 2021-01-01 RX ADMIN — CALCIUM CHLORIDE 8000 MG: 100 INJECTION, SOLUTION INTRAVENOUS at 16:38

## 2021-01-01 RX ADMIN — ALBUMIN (HUMAN) 25 G: 0.25 INJECTION, SOLUTION INTRAVENOUS at 04:38

## 2021-01-01 RX ADMIN — IPRATROPIUM BROMIDE AND ALBUTEROL SULFATE 1 AMPULE: 2.5; .5 SOLUTION RESPIRATORY (INHALATION) at 09:30

## 2021-01-01 RX ADMIN — BUDESONIDE 500 MCG: 0.5 SUSPENSION RESPIRATORY (INHALATION) at 19:38

## 2021-01-01 RX ADMIN — METOPROLOL SUCCINATE 25 MG: 25 TABLET, EXTENDED RELEASE ORAL at 09:07

## 2021-01-01 RX ADMIN — FINASTERIDE 5 MG: 5 TABLET, FILM COATED ORAL at 08:12

## 2021-01-01 RX ADMIN — BUMETANIDE 0.5 MG/HR: 0.25 INJECTION INTRAMUSCULAR; INTRAVENOUS at 05:36

## 2021-01-01 RX ADMIN — VASOPRESSIN 0.03 UNITS/MIN: 20 INJECTION INTRAVENOUS at 07:00

## 2021-01-01 RX ADMIN — EPINEPHRINE 30 MCG/MIN: 1 INJECTION INTRAMUSCULAR; INTRAVENOUS; SUBCUTANEOUS at 20:48

## 2021-01-01 RX ADMIN — IPRATROPIUM BROMIDE AND ALBUTEROL SULFATE 1 AMPULE: 2.5; .5 SOLUTION RESPIRATORY (INHALATION) at 09:20

## 2021-01-01 RX ADMIN — IPRATROPIUM BROMIDE AND ALBUTEROL SULFATE 1 AMPULE: 2.5; .5 SOLUTION RESPIRATORY (INHALATION) at 16:46

## 2021-01-01 RX ADMIN — Medication 10 ML: at 11:59

## 2021-01-01 RX ADMIN — INSULIN HUMAN 10 UNITS: 100 INJECTION, SOLUTION PARENTERAL at 03:22

## 2021-01-01 RX ADMIN — PIPERACILLIN AND TAZOBACTAM 3375 MG: 3; .375 INJECTION, POWDER, LYOPHILIZED, FOR SOLUTION INTRAVENOUS at 10:07

## 2021-01-01 RX ADMIN — IPRATROPIUM BROMIDE AND ALBUTEROL SULFATE 1 AMPULE: 2.5; .5 SOLUTION RESPIRATORY (INHALATION) at 09:50

## 2021-01-01 RX ADMIN — SODIUM BICARBONATE 50 MEQ: 84 INJECTION, SOLUTION INTRAVENOUS at 22:45

## 2021-01-01 RX ADMIN — DEXTROSE MONOHYDRATE 25 G: 25 INJECTION, SOLUTION INTRAVENOUS at 05:44

## 2021-01-01 RX ADMIN — ALLOPURINOL 100 MG: 100 TABLET ORAL at 08:50

## 2021-01-01 RX ADMIN — IPRATROPIUM BROMIDE AND ALBUTEROL SULFATE 1 AMPULE: 2.5; .5 SOLUTION RESPIRATORY (INHALATION) at 08:29

## 2021-01-01 RX ADMIN — PANTOPRAZOLE SODIUM 40 MG: 40 TABLET, DELAYED RELEASE ORAL at 06:01

## 2021-01-01 RX ADMIN — ETOMIDATE 30 MG: 2 INJECTION INTRAVENOUS at 11:15

## 2021-01-01 SDOH — HEALTH STABILITY: MENTAL HEALTH: HOW MANY STANDARD DRINKS CONTAINING ALCOHOL DO YOU HAVE ON A TYPICAL DAY?: NOT ASKED

## 2021-01-01 SDOH — HEALTH STABILITY: MENTAL HEALTH: HOW OFTEN DO YOU HAVE A DRINK CONTAINING ALCOHOL?: NOT ASKED

## 2021-01-01 ASSESSMENT — PULMONARY FUNCTION TESTS
PIF_VALUE: 28
PIF_VALUE: 26
PIF_VALUE: 31
PIF_VALUE: 29
PIF_VALUE: 29
PIF_VALUE: 26
PIF_VALUE: 25
PIF_VALUE: 28
PIF_VALUE: 31
PIF_VALUE: 31
PIF_VALUE: 26
PIF_VALUE: 33
PIF_VALUE: 28
PIF_VALUE: 29
PIF_VALUE: 27
PIF_VALUE: 26
PIF_VALUE: 29
PIF_VALUE: 38
PIF_VALUE: 26
PIF_VALUE: 28
PIF_VALUE: 29
PIF_VALUE: 26
PIF_VALUE: 27
PIF_VALUE: 27
PIF_VALUE: 31
PIF_VALUE: 29
PIF_VALUE: 25
PIF_VALUE: 29
PIF_VALUE: 27
PIF_VALUE: 25
PIF_VALUE: 31
PIF_VALUE: 28
PIF_VALUE: 28
PIF_VALUE: 27
PIF_VALUE: 27
PIF_VALUE: 26
PIF_VALUE: 27
PIF_VALUE: 29
PIF_VALUE: 27
PIF_VALUE: 27
PIF_VALUE: 26
PIF_VALUE: 29
PIF_VALUE: 27
PIF_VALUE: 29
PIF_VALUE: 29
PIF_VALUE: 27
PIF_VALUE: 28
PIF_VALUE: 29
PIF_VALUE: 35
PIF_VALUE: 28
PIF_VALUE: 26
PIF_VALUE: 29
PIF_VALUE: 29
PIF_VALUE: 27
PIF_VALUE: 28

## 2021-01-01 ASSESSMENT — PAIN SCALES - GENERAL
PAINLEVEL_OUTOF10: 0

## 2021-01-01 ASSESSMENT — LIFESTYLE VARIABLES: SMOKING_STATUS: 0

## 2021-02-18 NOTE — PROGRESS NOTES
OUTPATIENT CARDIOLOGY FOLLOW-UP    Name: Krystina Galvan    Age: 66 y.o. Primary Care Physician: Nickolas Cabrera MD    Date of Service: 2/18/2021    Chief Complaint: Nonischemic cardiomyopathy, chronic systolic heart failure, permanent atrial fibrillation, ventricular tachycardia, hypertension, chronic obstructive lung disease, chronic kidney disease    Interim History: Since his most recent evaluation of approximately 18 months earlier, the patient reports symptoms of decompensation particularly over the past 6 months with increasing symptomatology of exertional dyspnea and a reduction of his functional capabilities. He denies interim symptoms of anginal-like chest discomfort or other ischemic equivalents and relates no additional significant manifestations of volume overload beyond that of lower extremity edema. He denies arrhythmia related symptomatology nor discharges of his implantable cardioverter defibrillator with device interrogation demonstrating ongoing atrial fibrillation with approximately 20% ventricular pacing and multiple recurrences of nonsustained ventricular tachyarrhythmias. In addition in the interim, his medical management has been altered with the discontinuation of hydralazine and spironolactone with the patient normotensive at the time of his present assessment and no interim availability of laboratory assessment within the past year. He denies any symptoms of a focal neurologic origin nor bleeding in the face of anticoagulation monitored by his primary care physician. Review of Systems: The remainder of a complete multisystem review including consitutional, central nervous, respiratory, circulatory, gastrointestinal, genitourinary, endocrinologic, hematologic, musculoskeletal and psychiatric are negative.     Past Medical History:  Past Medical History:   Diagnosis Date    Arrhythmia     [facility]rial [facility] ([facility] [address]     CHF (congestive heart failure) (HCC)     CKD (chronic kidney disease)     Hyperlipidemia     Hypertension     Nonischemic cardiomyopathy ([facility] [address] [facility]     Thyroid disease        Past Surgical History:  Past Surgical History:   Procedure Laterality Date    CARDIAC DEFIBRILLATOR PLACEMENT  6/21/11    HERNIA REPAIR         Family History:  Family History   Problem Relation Age of Onset    Cancer Father     Cancer Sister        Social History:  Social History     Socioeconomic History    Marital status: Single     Spouse name: Not on file    Number of children: Not on file    Years of education: Not on file    Highest education level: Not on file   Occupational History    Not on file   Social Needs    Financial resource strain: Not on file    Food insecurity     Worry: Not on file     Inability: Not on file    Transportation needs     Medical: Not on file     Non-medical: Not on file   Tobacco Use    Smoking status: Former Smoker     Packs/day: 2.00     Years: 40.00     Pack years: 80.00     Types: Cigarettes     Quit date: 1/1/2006     Years since quitting: 15.1    Smokeless tobacco: Never Used   Substance and Sexual Activity    Alcohol use: Not Currently    Drug use: Never    Sexual activity: Not on file   Lifestyle    Physical activity     Days per week: Not on file     Minutes per session: Not on file    Stress: Not on file   Relationships    Social connections     Talks on phone: Not on file     Gets together: Not on file     Attends Worship service: Not on file     Active member of club or organization: Not on file     Attends meetings of clubs or organizations: Not on file     Relationship status: Not on file    Intimate partner violence     Fear of current or ex partner: Not on file     Emotionally abused: Not on file     Physically abused: Not on file     Forced sexual activity: Not on file   Other Topics Concern    Not on file   Social History Narrative    Drinks 1-2 Pepsi daily & occ coffee.        Allergies:  No Known Allergies Current Medications:  Current Outpatient Medications   Medication Sig Dispense Refill    metoprolol succinate (TOPROL XL) 50 MG extended release tablet Take 50 mg by mouth daily      finasteride (PROSCAR) 5 MG tablet Take 5 mg by mouth daily      JANTOVEN 2 MG tablet 2mg daily x 5 days per week  3    JANTOVEN 3 MG tablet 3mg 2 days per week  5    isosorbide mononitrate (IMDUR) 30 MG extended release tablet Take 1 tablet by mouth daily 30 tablet 11    acetaminophen (TYLENOL) 500 MG tablet Take 500 mg by mouth as needed for Pain       ammonium lactate (LAC-HYDRIN) 12 % lotion Apply topically as needed       aspirin 81 MG tablet Take 81 mg by mouth daily      furosemide (LASIX) 40 MG tablet Take 20 mg by mouth daily       levothyroxine (SYNTHROID) 125 MCG tablet Take 125 mcg by mouth daily       pravastatin (PRAVACHOL) 20 MG tablet Take 20 mg by mouth daily       OXYGEN Inhale 4 L into the lungs continuous       ferrous sulfate 325 (65 FE) MG tablet Take 325 mg by mouth daily (with breakfast).  allopurinol (ZYLOPRIM) 100 MG tablet Take 100 mg by mouth 2 times daily.  VITAMIN D, CHOLECALCIFEROL, PO Take 2,000 Units by mouth daily       tiotropium (SPIRIVA) 18 MCG inhalation capsule Inhale 18 mcg into the lungs daily. No current facility-administered medications for this visit. Physical Exam:  /60 (Site: Right Upper Arm, Position: Sitting, Cuff Size: Medium Adult)   Pulse 105   Resp 20   Ht 6' 1\" (1.854 m)   Wt 214 lb (97.1 kg)   SpO2 98%   BMI 28.23 kg/m²   Wt Readings from Last 3 Encounters:   02/18/21 214 lb (97.1 kg)   08/27/19 203 lb 9.6 oz (92.4 kg)   08/20/19 202 lb (91.6 kg)     The patient is awake, alert and in no discomfort or distress. No gross musculoskeletal deformity is present. No significant skin or nail changes are present. Gross examination of head, eyes, nose and throat are negative.  Jugular venous pressure is normal and no carotid bruits are present. Normal respiratory effort is noted with no accessory muscle usage present. Lung fields are clear to ascultation. Cardiac examination is notable for an irregular rhythm with no palpable thrill. No gallop rhythm or cardiac murmur are identified. A benign abdominal examination is present with no masses or organomegaly. Intact pulses are present throughout all extremities and mild pretibial edema is present. No focal neurologic deficits are present. Laboratory Tests:  Lab Results   Component Value Date    CREATININE 1.9 (H) 05/03/2018    BUN 40 (H) 05/03/2018     05/03/2018    K 4.5 05/03/2018    CL 97 (L) 05/03/2018    CO2 28 05/03/2018     No results found for: BNP  No results found for: WBC, RBC, HGB, HCT, MCV, MCH, MCHC, RDW, PLT, MPV  No results for input(s): ALKPHOS, ALT, AST, PROT, BILITOT, BILIDIR, LABALBU in the last 72 hours. Lab Results   Component Value Date    MG 2.2 01/18/2018     No results found for: PROTIME, INR  No results found for: TSH  No components found for: CHLPL  No results found for: TRIG  No results found for: HDL  No results found for: [facility]    Cardiac Tests:  ECG: A resting electrocardiogram demonstrates evidence of atrial fibrillation with a mean ventricular response of approximately 105 bpm with evidence of left axis deviation, and intraventricular conduction delay, delayed precordial transition zone and nonspecific ST changes      ASSESSMENT / PLAN: On a clinical basis, the patient presents with symptoms suggesting decompensation of chronic systolic heart failure in the face of a known nonischemic cardiomyopathy and previous severe left ventricular systolic dysfunction potentially in part related to alteration of previous optimal medical therapy with the discontinuation of hydralazine and spironolactone in addition to the potential adverse effects of tachycardia in the face of his permanent atrial fibrillation.   Based on the prolonged duration since his most recent evaluation as well as the absence of interim laboratory assessment of his renal function and electrolytes, repeat serum chemistries will be obtained as well as that of a proBNP level. Following review further optimization of medical management will be indicated as appropriate. In addition, from review of interim device interrogations, his existing implantable cardioverter defibrillator is approaching elective replacement indices with plans of a repeat echocardiogram for reevaluation of left ventricular and right ventricular systolic function to guide additional recommendations including that of the potential upgrade to that of a biventricular device to provide cardiac resynchronization and attempt to further optimize management of his previously documented severe left ventricular systolic dysfunction with needs of appropriate management of his episodes of nonsustained ventricular tachycardia to optimize biventricular pacing. I will provide additional recommendations as appropriate following the lesion and review of his objective assessments. He additionally will benefit from the consideration of enrollment at the heart failure clinic to further assist in heart failure management. Additional management of his chronic obstructive lung disease and chronic hypoxic respiratory failure will be deferred to primary care and that of his pulmonologist.  Appropriate monitoring of anticoagulation with goals of maintaining prothrombin times in the range of 2.0-2.5 times INR control will be necessary to reduce risk of embolic events in the face of his permanent atrial fibrillation. Ongoing aggressive risk factor modification of blood pressure and serum lipids will remain essential to reducing risk of future atherosclerotic development. I presently plan clinical reevaluation in approximately 3 months and would happily reevaluate him in the interim should additional cardiovascular difficulties or concerns arise.     The patient's current medication list, allergies, problem list and results of all previously ordered testing were reviewed at today's visit. Follow-up office visit in 3 months     The duration of review of interim records and discussion and counseling in conjunction with the present encounter exceeded 40 minutes      Note: This report was completed using computerized voice recognition software. Every effort has been made to ensure accuracy, however; and invert and computerized transcription errors may be present. uLc Alfaro.  Shanaarash Jimenez, [address] [place] [address]    An electronic copy of this follow-up progress note was forwarded to Dr. Vish Santiago

## 2021-03-19 PROBLEM — J96.20 ACUTE ON CHRONIC RESPIRATORY FAILURE (HCC): Status: ACTIVE | Noted: 2021-01-01

## 2021-03-19 PROBLEM — I50.23 ACUTE ON CHRONIC SYSTOLIC CONGESTIVE HEART FAILURE (HCC): Status: ACTIVE | Noted: 2021-01-01

## 2021-03-19 NOTE — PROGRESS NOTES
Date: 3/19/2021    Time: 1:53 PM    Patient Placed On BIPAP/CPAP/ Non-Invasive Ventilation? Yes    If no must comment. Facial area red/color change? No           If YES are Blister/Lesion present? No   If yes must notify nursing staff  BIPAP/CPAP skin barrier?   Yes    Skin barrier type:mepilexlite       Comments:     03/19/21 1350   NIV Type   Skin Assessment Clean, dry, & intact   Skin Protection for O2 Device Yes   Orientation Middle   Location Nose   NIV Started/Stopped On  (placed pt on at this time)   Equipment Type v60   Mode Bilevel   Mask Type Full face mask   Mask Size Medium   Settings/Measurements   IPAP 12 cmH20   CPAP/EPAP 6 cmH2O   Rate Ordered 16  (BUR)   Resp 26   FiO2  50 %   I Time/ I Time % 0.9 s   Vt Exhaled 569 mL   Minute Volume 15.9 Liters   Mask Leak (lpm) 27 lpm   Comfort Level Good   Using Accessory Muscles No   SpO2 98           [facility]

## 2021-03-19 NOTE — ED NOTES
Called down to [facility] regarding rapid covid result, per micro test was negative and unsure why it didn't cross into computer.  She will try and resend result to computer     iNest RealtySTANFORD Galvan, RN  03/19/21 9005

## 2021-03-19 NOTE — H&P
ago. His smoking use included cigarettes. He has a 80.00 pack-year smoking history. He has never used smokeless tobacco. He reports previous alcohol use. He reports that he does not use drugs. Family History:   family history includes Cancer in his father and sister. PHYSICAL EXAM:    Vitals:  /83   Pulse 89   Temp 98 °F (36.7 °C)   Resp 25   Ht 6' 1\" (1.854 m)   Wt 217 lb (98.4 kg)   SpO2 98%   BMI 28.63 kg/m²       General appearance: NAD, conversant, ill-appearing  Eyes: Sclerae  , PERRLA  HEENT: AT/NC, MMM  Neck: FROM, supple, no thyromegaly  Lymph: No cervical / supraclavicular lymphadenopathy  Lungs: Diminished bilaterally on auscultation, BiPAP FiO2 50%  CV: Irregular, no MRGs, left lower extremity edema +2, right lower extremity edema +1, 2+ pedal pulses bilaterally  Abdomen: Soft, non-tender; no masses or HSM, +BS active  Extremities: FROM without synovitis. No clubbing or cyanosis of the hands. 2+ left lower extremity edema, 1+ right lower extremity edema  Skin: no rash, induration, lesions, or ulcers  Psych: Calm and cooperative. Normal judgement and insight. Normal mood and affect. Neuro: Alert and interactive, face symmetric, speech fluent. LABS:  All labs reviewed.   Of note:  CBC with Differential:    Lab Results   Component Value Date    WBC 5.1 03/19/2021    RBC 4.81 03/19/2021    HGB 14.9 03/19/2021    HCT 47.8 03/19/2021     03/19/2021    MCV 99.4 03/19/2021    MCH 31.0 03/19/2021    MCHC 31.2 03/19/2021    RDW 18.9 03/19/2021     CMP:    Lab Results   Component Value Date     03/19/2021    K 4.9 03/19/2021     03/19/2021    CO2 29 03/19/2021    BUN 30 03/19/2021    CREATININE 1.8 03/19/2021    GFRAA 44 03/19/2021    LABGLOM 44 03/19/2021    GLUCOSE 87 03/19/2021    PROT 6.6 03/19/2021    LABALBU 3.4 03/19/2021    CALCIUM 9.4 03/19/2021    BILITOT 0.9 03/19/2021    ALKPHOS 63 03/19/2021    AST 23 03/19/2021    ALT 15 03/19/2021       Imaging:  CXR:

## 2021-03-19 NOTE — ED PROVIDER NOTES
ED Attending  CC: No      HPI:  3/19/21, Time: 11:48 AM BOUCHRA Garcia is a 66 y.o. male presenting to the ED for shortness of breath, beginning pta ago. The complaint has been persistent, moderate in severity, and worsened by nothing. Patient presents from urology office where he was being evaluated for his chronic BPH for complaints of increased shortness of breath. He does have a past medical history of congestive heart failure, COPD and does wear oxygen at 4 L nasal cannula continuously. States he had worsening shortness of breath over the last few days. He denies any chest pain currently. He does have chronic kidney disease as well as a history of atrial fibrillation and is on anticoagulation. He does have a defibrillator in place. He does follow with Dr. Coby Ryder on a regular basis. He denies any recent cough or cold symptoms. He denies any fevers. He denies any chest pain. States he has received both Covid vaccines. He denies any orthopnea. No evidence of JVD. ROS:   Pertinent positives and negatives are stated within HPI, all other systems reviewed and are negative.  --------------------------------------------- PAST HISTORY ---------------------------------------------  Past Medical History:  has a past medical history of Arrhythmia, Atrial fibrillation ([facility] [address] CHF (congestive heart failure) ([facility] [address] CKD (chronic kidney disease), Hyperlipidemia, Hypertension, Nonischemic cardiomyopathy ([facility] [address] and Thyroid disease. Past Surgical History:  has a past surgical history that includes Cardiac defibrillator placement (6/21/11) and hernia repair. Social History:  reports that he quit smoking about 15 years ago. His smoking use included cigarettes. He has a 80.00 pack-year smoking history. He has never used smokeless tobacco. He reports previous alcohol use. He reports that he does not use drugs. Family History: family history includes Cancer in his father and sister.      The patients home medications have been reviewed. Allergies: Patient has no known allergies. ---------------------------------------------------PHYSICAL EXAM--------------------------------------    Constitutional/General: Alert and oriented x3, ill appearing, non toxic in NAD  Head: Normocephalic and atraumatic  Eyes: PERRL, EOMI  Mouth: Oropharynx clear, handling secretions, no trismus  Neck: Supple, full ROM, non tender to palpation in the midline, no stridor, no crepitus, no meningeal signs, no evidence of JVD. Pulmonary: Lungs diminished to auscultation bilaterally, no wheezes,  or rhonchi. Not in respiratory distress, but he is tachypneic crackles in the bases bilaterally. Cardiovascular:  Regular rate. irregular rhythm. No murmurs, gallops, or rubs. 2+ distal pulses  Chest: no chest wall tenderness  Abdomen: Soft. Non tender. Non distended. +BS. No rebound, guarding, or rigidity. No pulsatile masses appreciated. Musculoskeletal: Moves all extremities x 4. Warm and well perfused, no clubbing, cyanosis, trace edema bilateral lower extremities. Capillary refill <3 seconds  Skin: warm and dry. No rashes. Neurologic: GCS 15, CN 2-12 grossly intact, no focal deficits, symmetric strength 5/5 in the upper and lower extremities bilaterally  Psych: Normal Affect    -------------------------------------------------- RESULTS -------------------------------------------------  I have personally reviewed all laboratory and imaging results for this patient. Results are listed below.      LABS:  Results for orders placed or performed during the hospital encounter of 03/19/21   COVID-19, Rapid    Specimen: Nasopharyngeal Swab   Result Value Ref Range    SARS-CoV-2, NAAT Not Detected Not Detected   Troponin   Result Value Ref Range    Troponin 0.04 (H) 0.00 - 0.03 ng/mL   CBC Auto Differential   Result Value Ref Range    WBC 5.1 4.5 - 11.5 E9/L    RBC 4.81 3.80 - 5.80 E12/L    Hemoglobin 14.9 12.5 - 16.5 g/dL Hematocrit 47.8 37.0 - 54.0 %    MCV 99.4 80.0 - 99.9 fL    MCH 31.0 26.0 - 35.0 pg    MCHC 31.2 (L) 32.0 - 34.5 %    RDW 18.9 (H) 11.5 - 15.0 fL    Platelets 231 998 - 405 E9/L    MPV 12.0 7.0 - 12.0 fL    Neutrophils % 75.4 43.0 - 80.0 %    Immature Granulocytes % 1.0 0.0 - 5.0 %    Lymphocytes % 11.3 (L) 20.0 - 42.0 %    Monocytes % 11.5 2.0 - 12.0 %    Eosinophils % 0.2 0.0 - 6.0 %    Basophils % 0.6 0.0 - 2.0 %    Neutrophils Absolute 3.81 1.80 - 7.30 E9/L    Immature Granulocytes # 0.05 E9/L    Lymphocytes Absolute 0.57 (L) 1.50 - 4.00 E9/L    Monocytes Absolute 0.58 0.10 - 0.95 E9/L    Eosinophils Absolute 0.01 (L) 0.05 - 0.50 E9/L    Basophils Absolute 0.03 0.00 - 0.20 E9/L    Anisocytosis 3+     Poikilocytes 1+     Ovalocytes 1+     Target Cells 1+    Comprehensive Metabolic Panel   Result Value Ref Range    Sodium 144 132 - 146 mmol/L    Potassium 4.9 3.5 - 5.0 mmol/L    Chloride 106 98 - 107 mmol/L    CO2 29 22 - 29 mmol/L    Anion Gap 9 7 - 16 mmol/L    Glucose 87 74 - 99 mg/dL    BUN 30 (H) 8 - 23 mg/dL    CREATININE 1.8 (H) 0.7 - 1.2 mg/dL    GFR Non-African American 44 >=60 mL/min/1.73    GFR African American 44     Calcium 9.4 8.6 - 10.2 mg/dL    Total Protein 6.6 6.4 - 8.3 g/dL    Albumin 3.4 (L) 3.5 - 5.2 g/dL    Total Bilirubin 0.9 0.0 - 1.2 mg/dL    Alkaline Phosphatase 63 40 - 129 U/L    ALT 15 0 - 40 U/L    AST 23 0 - 39 U/L   Protime-INR   Result Value Ref Range    Protime 29.8 (H) 9.3 - 12.4 sec    INR 2.7    APTT   Result Value Ref Range    aPTT 43.8 (H) 24.5 - 35.1 sec   Brain Natriuretic Peptide   Result Value Ref Range    Pro-BNP 12,394 (H) 0 - 450 pg/mL   Magnesium   Result Value Ref Range    Magnesium 2.3 1.6 - 2.6 mg/dL   Blood Gas, Arterial   Result Value Ref Range    Date Analyzed [id number]     Time Analyzed 1254     Source: Blood Arterial     pH, Blood Gas 7.369 7.350 - 7.450    PCO2 46.8 (H) 35.0 - 45.0 mmHg    PO2 87.6 75.0 - 100.0 mmHg    HCO3 26.4 (H) 22.0 - 26.0 mmol/L    B.E. 0. 5 -3.0 - 3.0 mmol/L    O2 Sat 96.1 92.0 - 98.5 %    O2Hb 94.8 94.0 - 97.0 %    COHb 1.0 0.0 - 1.5 %    MetHb 0.4 0.0 - 1.5 %    O2 Content 20.4 mL/dL    HHb 3.8 0.0 - 5.0 %    tHb (est) 15.3 11.5 - 16.5 g/dL    Mode NRB 15L     Date Of Collection      Time Collected      Pt Temp 37.0 C     ID [id number]     Lab [id number]     Critical(s) Notified . No Critical Values    EKG 12 Lead   Result Value Ref Range    Ventricular Rate 93 BPM    Atrial Rate 104 BPM    QRS Duration 94 ms    Q-T Interval 352 ms    QTc Calculation (Bazett) 437 ms    R Axis -130 degrees    T Axis 116 degrees       RADIOLOGY:  Interpreted by Radiologist.  XR CHEST PORTABLE   Final Result   Patchy bilateral lower lobe airspace disease. Given the marked cardiomegaly   findings could represent CHF or pneumonia. EKG Interpretation  Interpreted by emergency department physician    Rhythm: atrial fibrillation - controlled  Rate: normal  Axis: normal  Conduction: normal  ST Segments: no acute change  T Waves: no acute change    Clinical Impression: no acute changes  Comparison to prior EKG: stable as compared to patient's most recent EKG      ------------------------- NURSING NOTES AND VITALS REVIEWED ---------------------------   The nursing notes within the ED encounter and vital signs as below have been reviewed by myself. BP (!) 119/93   Pulse 90   Temp 98 °F (36.7 °C)   Resp 24   Ht 6' 1\" (1.854 m)   Wt 217 lb (98.4 kg)   SpO2 98%   BMI 28.63 kg/m²   Oxygen Saturation Interpretation: Normal    The patients available past medical records and past encounters were reviewed.         ------------------------------ ED COURSE/MEDICAL DECISION MAKING----------------------  Medications   perflutren lipid microspheres (DEFINITY) injection 1.65 mg (has no administration in time range)   furosemide (LASIX) injection 40 mg (40 mg Intravenous Given 3/19/21 1410)   aspirin chewable tablet 324 mg (324 mg Oral Given 3/19/21 1507) Medical Decision Makin-patient was examined by  Dr. Liborio Patton, we will continue to monitor and reevaluate. 1330-patient was placed on BiPAP we will continue to monitor states he feels improved once on the BiPAP. 1500-call to Dr. Rodriguez Davila, update on patient's clinical presentation physical exam diagnostic results admit the patient under his service to a telemetry bed with diagnosis of congestive heart failure exacerbation with respiratory failure and hypoxemia. Vital signs remained stable. Pulse oximetry 98%. Patient remains tachypneic. Re-Evaluations:             Re-evaluation. Patients symptoms show no change      Consultations:                 Critical Care: This patient's ED course included: a personal history and physicial examination, re-evaluation prior to disposition, multiple bedside re-evaluations, IV medications, cardiac monitoring, continuous pulse oximetry, complex medical decision making and emergency management and a personal history and physicial eaxmination    This patient has remained hemodynamically stable and remained unchanged during their ED course. Counseling: The emergency provider has spoken with the patient and discussed todays results, in addition to providing specific details for the plan of care and counseling regarding the diagnosis and prognosis. Questions are answered at this time and they are agreeable with the plan.       --------------------------------- IMPRESSION AND DISPOSITION ---------------------------------    IMPRESSION  1. Acute on chronic congestive heart failure, unspecified heart failure type ([address]    2. Acute respiratory failure with hypoxia (HCC)        DISPOSITION  Disposition: Admit to telemetry  Patient condition is stable        NOTE: This report was transcribed using voice recognition software.  Every effort was made to ensure accuracy; however, inadvertent computerized transcription errors may be present         Genesis L

## 2021-03-19 NOTE — ED PROVIDER NOTES
ATTENDING PROVIDER ATTESTATION:     Chuck Layton presented to the emergency department for evaluation of Shortness of Breath (usually wears 3-4L. Arrived on NRB @ 99% SPo2)    I have reviewed and discussed the case, including pertinent history (medical, surgical, family and social) and exam findings with the Midlevel and the Nurse assigned to Chuck Layton. I have personally performed and/or participated in the history, exam, medical decision making, and procedures and agree with all pertinent clinical information. I performed a face to face evaluation or the patient as well as guided the management and care of this patient. I have reviewed my findings and recommendations with Chuck Layton and members of family present at the time of disposition. History of Present Illness:      Chuck Layton is a 66 y.o. male presenting to the ED for shortness of breath and hypoxemia, beginning prior to arrival.  The complaint has been persistent, moderate in severity, and worsened by nothing. Worsening shortness of breath for a few days. Says he feels congested. Some orthopnea. No fever or chills. No cough. No URI sx. Hx of HFrEF. He arrives in some respiratory distress with tachypnea. Denies covid 19 exposure. Review of Systems:   A complete review of systems was performed and pertinent positives and negatives are stated within HPI, all other systems reviewed and are negative.    --------------------------------------------- PAST HISTORY ---------------------------------------------  Past Medical History:  has a past medical history of Arrhythmia, Atrial fibrillation ([address] CHF (congestive heart failure) ([address] CKD (chronic kidney disease), Hyperlipidemia, Hypertension, Nonischemic cardiomyopathy ([facility] [address] and Thyroid disease. Past Surgical History:  has a past surgical history that includes Cardiac defibrillator placement (6/21/11) and hernia repair.     Social History:  reports that he quit smoking about 15 years ago. His smoking use included cigarettes. He has a 80.00 pack-year smoking history. He has never used smokeless tobacco. He reports previous alcohol use. He reports that he does not use drugs. Family History: family history includes Cancer in his father and sister. Unless otherwise noted, family history is non contributory    The patients home medications have been reviewed. Allergies: Patient has no known allergies. My findings/plan:   Constitutional/General: Alert and oriented x3  Head: Normocephalic and atraumatic  Eyes: PERRL, EOMI, conjunctiva normal, sclera non icteric  Mouth: Oropharynx clear, handling secretions,  Neck: Supple, full ROM, no stridor, no meningeal signs  Respiratory: Lungs with decreased BS bilaterally and rales bilaterally. Tachypnea and in mild respiratory distress. Cardiovascular:  Irregularly irregular, No murmurs, no aortic murmurs, no gallops, no rubs. 2+ distal pulses. Equal extremity pulses. Chest: No chest wall tenderness  GI:  Abdomen Soft, Non tender, Non distended. +BS. No rebound, guarding, or rigidity. No pulsatile masses. Musculoskeletal: Moves all extremities x 4. Warm and well perfused, no clubbing, no cyanosis, no edema. Capillary refill <3 seconds  Integument: skin warm and dry. No rashes. Neurologic: GCS 15, no focal deficits      I, Dr. Shoaib Crisostomo, am the primary provider of record  I was asked to see, evaluated and directly manage the care of this patient based on the complexity of the case         Acute respiratory failure with hypoxemic and hypercarbia secondary to acute heart failure  Initially in some respiratory distress, placed on bipap to prevent life threatening deterioration. Improving on bipap. Lasix also ordered. No infectious sx. Already anticoagulated so PE less likely. He denies chest pain. He is hemodynamically stable. EKG with stable a fib. No ST elevation.     Medicine consulted for admission    CRITICAL CARE:  Please note that the withdrawal or failure to initiate urgent interventions for this patient would likely result in a life threatening deterioration or permanent disability. Accordingly this patient received 30 minutes of critical care time, including coordination of care, and direct bedside care and excluding separately billable procedures. 1. Acute on chronic congestive heart failure, unspecified heart failure type ([facility] [address]    2.  Acute respiratory failure with hypoxia [facility])                 Candi Owen MD  03/19/21 4613

## 2021-03-20 NOTE — PROGRESS NOTES
Message sent Tasia Jones NP  Via perfect serve to clarify which cardiologist would be seeing the patient.

## 2021-03-20 NOTE — PROGRESS NOTES
Date: 3/20/2021    Time: 8:49 AM    Patient Placed On BIPAP/CPAP/ Non-Invasive Ventilation? Yes    If no must comment. Facial area red/color change? No           If YES are Blister/Lesion present? No   If yes must notify nursing staff  BIPAP/CPAP skin barrier?   Yes    Skin barrier type:mepilexlite       Comments:        Kati Yang

## 2021-03-20 NOTE — PROGRESS NOTES
Patient hypotensive throughout the morning, multiple anti-hypertensive medication was ordered. NP with cardiology was notified and asked which medications were to be given. NP stated that the cardiologist would be seeing the patient.

## 2021-03-20 NOTE — PROGRESS NOTES
Date: 3/20/2021    Time: 1:15 AM    Patient Placed On BIPAP/CPAP/ Non-Invasive Ventilation? Yes    If no must comment. Facial area red/color change? No           If YES are Blister/Lesion present? No   If yes must notify nursing staff  BIPAP/CPAP skin barrier?   Yes    Skin barrier type:mepilexlite       Comments:        Bria Lawrence

## 2021-03-20 NOTE — PROGRESS NOTES
Date: 3/19/2021    Time: 8:14 PM    Patient Placed On BIPAP/CPAP/ Non-Invasive Ventilation? No    If no must comment. Facial area red/color change? No           If YES are Blister/Lesion present? No   If yes must notify nursing staff  BIPAP/CPAP skin barrier?   Yes    Skin barrier type:mepilexlite       Comments:    Patient refused to wear the bipap    Theola Grit

## 2021-03-20 NOTE — PROGRESS NOTES
Date: 3/20/2021    Time: 3:38 PM    Patient Placed On BIPAP/CPAP/ Non-Invasive Ventilation? No    If no must comment. Facial area red/color change? No           If YES are Blister/Lesion present? No   If yes must notify nursing staff  BIPAP/CPAP skin barrier? Yes    Skin barrier type:mepilexlite       Comments: found patient on bipap already, has been wearing continuous all day. Will switch to total FM tonight at 12 hour marcella. Patient tolerating bipap well, fio2 weaned to 80% from 100%.  Spo2 96%        [facility]

## 2021-03-20 NOTE — PROGRESS NOTES
Patient was on side of bed without oxygen at 0745 and stated that he did not feel well. Patients oxygen was 53 percent on room air. Patient placed back in bed placed on high flow of 15Liters. Oxygen then increased to 80 percent. Placed on Non rebreather in addition to high flow. Patients oxygen was then 84 percent. I notified respiratory and the patient was placed back on BIPAP. Oxygen is now 99%. Patient is resting comfortable and is not complaining of any shortness of breath.

## 2021-03-20 NOTE — PLAN OF CARE
Problem: OXYGENATION/RESPIRATORY FUNCTION  Goal: Patient will maintain patent airway  Outcome: Met This Shift     Problem: OXYGENATION/RESPIRATORY FUNCTION  Goal: Patient will achieve/maintain normal respiratory rate/effort  Description: Respiratory rate and effort will be within normal limits for the patient  Outcome: Met This Shift     Problem: ACTIVITY INTOLERANCE/IMPAIRED MOBILITY  Goal: Mobility/activity is maintained at optimum level for patient  Outcome: Met This Shift

## 2021-03-20 NOTE — CONSULTS
Inpatient Cardiology Consultation      Reason for Consult:  CHF    Consulting Physician: Dr. Sade Krishna    Requesting Physician:  Dr. Traci Reyes    Date of Consultation: 3/20/2021    HISTORY OF PRESENT ILLNESS:   Mr. Bo Travis is a 66year old  male who follows with Dr. Kimmy Gutierrez and Dr. Steve Saha. He was most recently seen in the office with Dr. Kimmy Gutierrez on 02/18/2021. His medical history includes Nonischemic Cardiomyopathy s/p ICD, permanent Atrial Fibrillation, chronic anticoagulation with Coumadin, Chronic HFrEF, CKD, COPD, HTN, HLD, remote tobacco abuse, hypothyroidism, and morbid obesity. Mr. Bo Travis presented to [facility] ED on 03/19/2021 with complaints of dyspnea. He states that as he was walking up a flight of stairs he had sudden onset of KING \"that wouldn't go away and I couldn't catch my breath\". He denies orthopnea, PND, and chest pain. Upon arrival to the ED his VS were 11-73-/75-99% on 15 liters via NRB. Covid negative. Troponin 0.04. WBC 5.1. H&H 14.9/47.8. BUN/SCr 30/1.8. ProBNP [id number]. ABG on 15 liters NRB with pH 7.369. PCO2 46.8. PO2 87.6. CXR with CHF vs pneumonia. He received Lasix 40mg IV and ASA 324mg. He was admitted to a telemetry monitored unit. Echocardiogram was ordered. Cardiology was consulted by Dr. Traci Reyes for management of CHF. Please note: past medical records were reviewed per electronic medical record (EMR) - see detailed reports under Past Medical/ Surgical History. Past Medical and Surgical History:    1. Presumed Nonischemic Cardiomyopathy  2. Nonischemic Lexiscan MPS 04/22/2011  3. S/p ICD 07/01/2011 [facility])  4. Echocardiogram 01/19/2018 (Dr. Jennie Burns): Severely dilated LV. Severe FLAKITA. Stage III Diastolic Dysfunction. EF 35%. Moderate MR. Moderate TR. Moderate PHTN  5. Limited Echocardiogram 03/13/2018: EF 25%. 6. Permanent Atrial Fibrillation   7. Chronic anticoagulation with Coumadin  8.  Prior Amiodarone use (discontinued due to \"pulmonary issues\" in the past)  9. Chronic HFrEF  10. COPD  6. Remote tobacco abuse  12. CKD  13. HTN  14. HLD  15. Morbid Obesity  16. Hypothyroidism  17. S/p Hernia repair      Medications Prior to admit:  Prior to Admission medications    Medication Sig Start Date End Date Taking? Authorizing Provider   metoprolol succinate (TOPROL XL) 50 MG extended release tablet Take 75 mg by mouth daily Pt to take 50mg in the morning and 25mg in the evening    Historical Provider, MD   finasteride (PROSCAR) 5 MG tablet Take 5 mg by mouth daily    Historical Provider, MD   JANTOVEN 2 MG tablet 2mg daily x 5 days per week 7/7/19   Historical Provider, MD   JANTOVEN 3 MG tablet 3mg 2 days per week 8/5/19   Historical Provider, MD   isosorbide mononitrate (IMDUR) 30 MG extended release tablet Take 1 tablet by mouth daily 5/8/19   Dayami Avelar MD   acetaminophen (TYLENOL) 500 MG tablet Take 500 mg by mouth as needed for Pain     Historical Provider, MD   ammonium lactate (LAC-HYDRIN) 12 % lotion Apply topically as needed  5/17/17   Historical Provider, MD   aspirin 81 MG tablet Take 81 mg by mouth daily    Historical Provider, MD   furosemide (LASIX) 40 MG tablet Take 20 mg by mouth daily     Historical Provider, MD   levothyroxine (SYNTHROID) 125 MCG tablet Take 125 mcg by mouth daily  4/18/16   Historical Provider, MD   pravastatin (PRAVACHOL) 20 MG tablet Take 20 mg by mouth daily  2/26/16   Historical Provider, MD   OXYGEN Inhale 4 L into the lungs continuous     Historical Provider, MD   ferrous sulfate 325 (65 FE) MG tablet Take 325 mg by mouth daily (with breakfast). Historical Provider, MD   allopurinol (ZYLOPRIM) 100 MG tablet Take 100 mg by mouth 2 times daily. Historical Provider, MD   VITAMIN D, CHOLECALCIFEROL, PO Take 2,000 Units by mouth daily     Historical Provider, MD   tiotropium (SPIRIVA) 18 MCG inhalation capsule Inhale 18 mcg into the lungs daily.       Historical Provider, MD       Current Medications: Current Facility-Administered Medications: lactulose (CHRONULAC) 10 GM/15ML solution 20 g, 20 g, Oral, TID  allopurinol (ZYLOPRIM) tablet 100 mg, 100 mg, Oral, BID  aspirin chewable tablet 81 mg, 81 mg, Oral, Daily  ferrous sulfate (IRON 325) tablet 325 mg, 325 mg, Oral, Daily with breakfast  finasteride (PROSCAR) tablet 5 mg, 5 mg, Oral, Daily  isosorbide mononitrate (IMDUR) extended release tablet 30 mg, 30 mg, Oral, Daily  levothyroxine (SYNTHROID) tablet 125 mcg, 125 mcg, Oral, Daily  metoprolol succinate (TOPROL XL) extended release tablet 75 mg, 75 mg, Oral, Daily  pravastatin (PRAVACHOL) tablet 20 mg, 20 mg, Oral, Daily  ipratropium-albuterol (DUONEB) nebulizer solution 1 ampule, 1 ampule, Inhalation, Q4H WA  sodium chloride flush 0.9 % injection 10 mL, 10 mL, Intravenous, 2 times per day  sodium chloride flush 0.9 % injection 10 mL, 10 mL, Intravenous, PRN  promethazine (PHENERGAN) tablet 12.5 mg, 12.5 mg, Oral, Q6H PRN **OR** ondansetron (ZOFRAN) injection 4 mg, 4 mg, Intravenous, Q6H PRN  magnesium hydroxide (MILK OF MAGNESIA) 400 MG/5ML suspension 30 mL, 30 mL, Oral, Daily PRN  acetaminophen (TYLENOL) tablet 650 mg, 650 mg, Oral, Q6H PRN **OR** acetaminophen (TYLENOL) suppository 650 mg, 650 mg, Rectal, Q6H PRN  furosemide (LASIX) injection 40 mg, 40 mg, Intravenous, BID  perflutren lipid microspheres (DEFINITY) injection 1.65 mg, 1.5 mL, Intravenous, ONCE PRN  [START ON 3/23/2021] warfarin (COUMADIN) tablet 3 mg, 3 mg, Oral, Once per day on Tue Thu  warfarin (COUMADIN) tablet 2 mg, 2 mg, Oral, Once per day on Sun Mon Wed Fri Sat    Allergies:  Patient has no known allergies. Social History:   Denies tobacco and illicit drug use. States that he stopped drinking alcohol \"heavily and all together 1-2 years ago. Drinks Pepsi on occasion. Family History:   Please note this information was not obtained at this time as it is limited in nature due to the patient's advanced age.        REVIEW OF SYSTEMS:     Remains on Bipap--> See HPI. PHYSICAL EXAM:   BP 99/66   Pulse 93   Temp 97.4 °F (36.3 °C) (Temporal)   Resp 26   Ht 6' 1\" (1.854 m)   Wt 233 lb (105.7 kg)   SpO2 90%   BMI 30.74 kg/m²   CONST:  Well developed, obese, elderly male who appears stated age. Awake, alert, cooperative, on Bipap in no apparent distress  HEENT:   Head- Normocephalic, atraumatic   Eyes- Conjunctivae pink, anicteric  Throat- Oral mucosa pink and moist  Neck-  No stridor, trachea midline, no jugular venous distention. No adenopathy   CHEST: Chest symmetrical and non-tender to palpation. No accessory muscle use or intercostal retractions  RESPIRATORY: Lung sounds -diminished throughout fields   CARDIOVASCULAR:     No carotid bruit  Heart Inspection- shows no noted pulsations  Heart Palpation- no heaves or thrills; PMI is non-displaced   Heart Ausculation- Irregular rate and rhythm, no murmur. No s3, or rub   PV: No lower extremity edema. No varicosities. Pedal pulses palpable, no clubbing or cyanosis   ABDOMEN: Soft, obese, non-tender to light palpation. Bowel sounds present. No palpable masses no organomegaly; no abdominal bruit  MS: Good muscle strength and tone. No atrophy or abnormal movements. : Deferred  SKIN: Warm and dry no statis dermatitis or ulcers   NEURO / PSYCH: Oriented to person, place and time. Speech clear and appropriate. Follows all commands.  Pleasant affect     DATA:    ECG: A Fib with CVR   Tele strips: Currently A Fib with HR 80s with 7 beats of NSVT  Diagnostic:      Intake/Output Summary (Last 24 hours) at 3/20/2021 1028  Last data filed at 3/20/2021 0931  Gross per 24 hour   Intake 120 ml   Output --   Net 120 ml       Labs:   CBC:   Recent Labs     03/19/21  1210   WBC 5.1   HGB 14.9   HCT 47.8        BMP:   Recent Labs     03/19/21  1210 03/20/21  0659    140   K 4.9 5.8*   CO2 29 30*   BUN 30* 39*   CREATININE 1.8* 2.1*   LABGLOM 44 37   CALCIUM 9.4 9.0     Mag:   Recent CHF.   Currently on BiPAP, denies CP. No Orthopnea. No palpitations. Family, sister, at bed side. He states he is complaint with his medications. Review of systems:  Review of 10 systems,  limited since on BiPAP, negative except as mentioned in the HPI    Medical History: Reviewed    Surgical history: Reviewed    FamilyHistory: Reviewed    Allergies:  Reviewed    Social Hx: Reviewed. Scheduled Meds:   lactulose  20 g Oral TID    allopurinol  100 mg Oral BID    aspirin  81 mg Oral Daily    ferrous sulfate  325 mg Oral Daily with breakfast    finasteride  5 mg Oral Daily    isosorbide mononitrate  30 mg Oral Daily    levothyroxine  125 mcg Oral Daily    metoprolol succinate  75 mg Oral Daily    pravastatin  20 mg Oral Daily    ipratropium-albuterol  1 ampule Inhalation Q4H WA    sodium chloride flush  10 mL Intravenous 2 times per day    furosemide  40 mg Intravenous BID    [START ON 3/23/2021] warfarin  3 mg Oral Once per day on Tue Thu    warfarin  2 mg Oral Once per day on Sun Mon Wed Fri Sat     Continuous Infusions:  PRN Meds:.sodium chloride flush, promethazine **OR** ondansetron, magnesium hydroxide, acetaminophen **OR** acetaminophen, perflutren lipid microspheres      Labs/imaging studies: Reviewed. Above BIANCA exam, assessment reviewed and reflect my work. I personally saw, examined, and evaluated the patient today. I personally reviewed the medications, rhythm strips, pertinent labs and test reports. I directly participated in the medical-decision making, ordering pertinent tests and medication adjustment. Physical exam:     Vitals:    03/20/21 1646   BP: 97/67   Pulse: 89   Resp:    Temp:    SpO2:        In general, this is a well developed, well nourished who appears stated age. awake, alert, cooperative, mild respiratory distress    HEENT: eyes -conjunctivae pink,   Neck-  no stridor, no carotid bruit.  no jugular venous distention   RESPIRATORY: Chest symmetrical and non-tender to palpation. No accessory muscles use. Lung auscultation -  few rhonchi  CARDIOVASCULAR:     Heart Inspection shows no noted pulsations  Heart Palpation - no palpable thrills  Heart Ausculation - Irregular rate and rhythm, 2/6 systolic murmur, No s3 or rub.   + lower extremity edema, no varicosities. Distal pulses palpable, no clubbing or cyanosis   ABDOMEN: Soft, nontender,  Bowel sounds present. MS: n/a. : Deferred  Rectal Exam: Deferred  SKIN: warm and dry  NEURO / PSYCH: oriented to person, place        Impression/Recommendations:    Acute on chronic HFrEF - Continue diuretics, monitor wts, I/Os, BP and renal Fn    Acute respiratory failure - On BiPAP, Being transferred to ICU when bed available    Hypotension - Hold Metoprolol, Isosorbide    Borderline elevated Troponin - Due to CKD, hypoxemia    A fib - On Coumadin for [address], INR 2.6    Nonischemic CMP - On Metoprolol, Isosorbide, Add Hydralazine as his Franck tolerates - No ACE/ARN/Entresto due to CKD    S/p ICD 2011    VHD    Pulmonary HTN    CKD              Above recommendations discussed with him and his sister. Thank you for the consult.         Agustin Echeverria MD  3/20/2021  [facility]) Cardiology

## 2021-03-20 NOTE — DISCHARGE INSTR - DIET
saturated fat is right for you. There are many foods that do not contain large amounts of saturated fats. Swapping these foods to replace foods high in saturated fats will help you limit the saturated fat you eat and improve your cholesterol levels. You can also try eating more plant-based or vegetarian meals. Instead of Try:   Whole milk, cheese, yogurt, and ice cream 1%, ½%, or skim milk, low-fat cheese, non-fat yogurt, and low-fat ice cream   Fatty, marbled beef and pork Lean beef, pork, or venison   Poultry with skin Poultry without skin   Butter, stick margarine Reduced-fat, whipped, or liquid spreads   Coconut oil, palm oil Liquid vegetable oils: corn, canola, olive, soybean and safflower oils   Avoid trans fats. Trans fats increase levels of LDL-cholesterol. Hydrogenated fat in processed foods is the main source of trans fats in foods. Trans fats can be found in stick margarine, shortening, processed sweets, baked goods, some fried foods, and packaged foods made with hydrogenated oils. Avoid foods with partially hydrogenated oil on the ingredient list such as: cookies, pastries, baked goods, biscuits, crackers, microwave popcorn, and frozen dinners. Choose foods with heart healthy fats. Polyunsaturated and monounsaturated fat are unsaturated fats that may help lower your blood cholesterol level when used in place of saturated fat in your diet. Ask your RDN about taking a dietary supplement with plant sterols and stanols to help lower your cholesterol level. Research shows that substituting saturated fats with unsaturated fats is beneficial to cholesterol levels. Try these easy swaps:   Instead of Try:   Butter, stick margarine, or solid shortening Reduced-fat, whipped, or liquid spreads   Beef, pork, or poultry with skin Fish and seafood   Chips, crackers, snack foods Raw or unsalted nuts and seeds or nut butters  Hummus with vegetables  Avocado on toast   Coconut oil, palm oil Liquid vegetable oils: corn, canola, olive, soybean and safflower oils   Limit the amount of cholesterol you eat to less than 200 milligrams per day. Cholesterol is a substance carried through the bloodstream via lipoproteins, which are known as transporters of fat. Some body functions need cholesterol to work properly, but too much cholesterol in the bloodstream can damage arteries and build up blood vessel linings (which can lead to heart attack and stroke). You should eat less than 200 milligrams cholesterol per day. People respond differently to eating cholesterol. There is no test available right now that can figure out which people will respond more to dietary cholesterol and which will respond less. For individuals with high intake of dietary cholesterol, different types of increase (none, small, moderate, large) in LDL-cholesterol levels are all possible. Food sources of cholesterol include egg yolks and organ meats such as liver, gizzards. Limit egg yolks to two to four per week and avoid organ meats like liver and gizzards to control cholesterol intake. Tips for Choosing Heart-Healthy Carbohydrates  Consume foods rich in viscous (soluble) fiber  Viscous, or soluble, fiber is found in the walls of plant cells. Viscous fiber is found only in plant-based foods--animal-based foods like meat or dairy products do not contain fiber. In the stomach, viscous fibers absorb water and swell to form a thick, jelly-like mass. This helps to lower your unhealthy cholesterol   Rich sources of viscous fiber include asparagus, [place] sprouts, sweet potatoes, turnips, apricots, mangoes, oranges, legumes, barley, oats, and oat bran. Eat at least 5 to 10 grams of viscous fiber each day. As you increase your fiber intake gradually, also increase the amount of water you drink. This will help prevent constipation. If you have difficulty achieving this goal, ask your RDN about fiber laxatives.  Choose fiber supplements made with viscous fibers such as psyllium seed husks or methylcellulose to help lower unhealthy cholesterol. Limit refined carbohydrates   There are three types of carbohydrates: starches, sugar, and fiber. Some carbohydrates occur naturally in food, like the starches in rice or corn or the sugars in fruits and milk. Refined carbohydrates--foods with high amounts of simple sugars--can raise triglyceride levels. High triglyceride levels are associated with coronary heart disease. Some examples of refined carbohydrate foods are table sugar, sweets, and beverages sweetened with added sugar. Tips for Reducing Sodium (Salt)  Although sodium is important for your body to function, too much sodium can be harmful for people with high blood pressure. As sodium and fluid buildup in your tissues and bloodstream, your blood pressure increases. High blood pressure may cause damage to other organs and increase your risk for a stroke. Even if you take a pill for blood pressure or a water pill (diuretic) to remove fluid, it is still important to have less salt in your diet. Ask your doctor and RDN what amount of sodium is right for you. Avoid processed foods. Eat more fresh foods. Fresh fruits and vegetables are naturally low in sodium, as well as frozen vegetables and fruits that have no added juices or sauces. Fresh meats are lower in sodium than processed meats, such as almeida, sausage, and hotdogs. Read the nutrition label or ask your  to help you find a fresh meat that is low in sodium. Eat less salt--at the table and when cooking. A single teaspoon of table salt has 2,300 mg of sodium. Leave the salt out of recipes for pasta, casseroles, and soups. Ask your RDN how to cook your favorite recipes without sodium  Be a smart . Look for food packages that say salt-free or sodium-free.  These items contain less than 5 milligrams of sodium per serving.    Very low-sodium products contain less than 35 milligrams of sodium per serving. Low-sodium products contain less than 140 milligrams of sodium per serving. Beware of reduced salt or reduced sodium products. These items may still be high in sodium. Check the nutrition label. Add flavors to your food without adding sodium. Try lemon juice, lime juice, fruit juice or vinegar. Dry or fresh herbs add flavor. Try basil, bay leaf, dill, rosemary, parsley, oscar, dry mustard, nutmeg, thyme, and paprika. Pepper, red pepper flakes, and cayenne pepper can add spice to your meals without adding sodium. Hot sauce contains sodium, but if you use just a drop or two, it will not add up to much. Buy a sodium-free seasoning blend or make your own at home. Additional Lifestyle Tips  Achieve and maintain a healthy weight. Talk with your RDN or your doctor about what is a healthy weight for you. Set goals to reach and maintain that weight. To lose weight, reduce your calorie intake along with increasing your physical activity. A weight loss of 10 to 15 pounds could reduce LDL-cholesterol by 5 milligrams per deciliter. Participate in physical activity. Talk with your health care team to find out what types of physical activity are best for you. Set a plan to get about 30 minutes of exercise on most days.     Foods Recommended  Food Group Foods Recommended   Grains Grain foods including whole grains: whole wheat, barley, rye, buckwheat, corn, teff, quinoa, millet, amaranth, brown or wild rice, sorghum, and oats  Processed whole grains such as pasta, rice, hot and cold cereals, and snacks that contain less than 300 mg sodium per serving  Whole grain bread, crackers, rolls, or simone with <29 mg sodium per slice (Note: yeast breads usually have less sodium than those made with baking soda),   Home-made bread made with reduced-sodium baking soda   Protein Foods Fresh red meat: lean, trimmed cuts of beef, pork, or lamb   Fresh poultry: skinless chicken or turkey  Fresh seafood: fish (particularly fatty fish: salmon, herring), shrimp, lobster, clams, and scallops   Eggs (2-4 per week), eggwhites or egg substitute  Nuts and seeds (unsalted): peanuts, almonds, pistachios, and sunflower seeds, unsalted; peanut butter, almond butter, and sunflower seed butter, reduced sodium. Soy foods: tofu, tempeh, or soynuts  Meat alternatives: veggie burgers and sausages from plant protein without added sodium  Legumes: such as dried beans, lentils, or peas at least a few times per week in place of other protein sources, unsalted   Dairy Skim, ½% or 1% milk, low-fat yogurt, low-sodium cheeses (Swiss cheese, ricotta cheese, and fresh mozzarella, low sodium cottage cheese)  Fortified soymilk, almond milk, rice milk, hemp milk  Frozen desserts (½ cup) made from low-fat milk   Vegetables Fresh, frozen, and canned (unsalted) whole vegetables, including dark-green, red and orange vegetables, legumes (beans and peas), and starchy vegetables without added sauces, salt, or sodium; low-sodium vegetable juices   Fruits Fresh, frozen, canned and dried whole unsweetened fruits canned fruit packed in water or fruit juice without added sugar; 100% fruit juice    Oils Unsaturated vegetable oils: [place], avocado, canola, cashew, corn, grapeseed, olive, safflower, sesame, soybean, sunflower  Margarines and spreads which list liquid vegetable oil as the first ingredient and does not contain trans fats (partially hydrogenated oil)  Salad dressings made from oil and low in sodium (salt)   Avocado   Other Prepared foods, including soups, casseroles, and salads made from recommended ingredients and contain <600 mg sodium. Homemade soups, casseroles, entrees, and side dishes typically contain less sodium that prepared alternatives.    Homemade soups and sauces such as gravy  Low-sodium crackers, chips, pretzels  Low-sodium seasonings (ketchup, BBQ)  Spices, herbs, Salt-free seasoning mixes and marinades  Vinegar   Lemon or lime juice     Foods Not Recommended  Food Group Foods Not Recommended   Grains Breakfast cereals, packaged baked goods, snack crackers, and prepared grains with more than 300 mg sodium per serving    Biscuits, cornbread, and other quick breads prepared with baking soda  Breads or crackers topped with salt  Bakery products, such as doughnuts, biscuits, croissants, [place] pastries, pies, cookies  Instant potatoes, noodles, rice, stuffing mix, or macaroni and cheese  Snacks made with partially hydrogenated oils, including chips, cheese puffs, snack mixes, regular crackers, butter-flavored popcorn  Prepackaged bread crumbs    Self-rising flours   Protein Foods Meats high in saturated fat such as ribs, t-bone steak, regular 70/30 hamburger  Processed red meats, such as almeida, sausage, ham, pepperoni, hot dogs, corned beef, cured or smoked meats, canned meat, chili, parisa sausage, sardines, and spam with added sodium  Deli meats, such as pastrami, bologna, or salami (made of meat or poultry) with added sodium  Organ meat such as liver, gizzards, or sweetbread  Preseasoned and precooked meats  Poultry with skin or processed poultry (chicken and turkey) with skin, breading, or high-sodium marinades or sauces  Whole eggs or egg yolks (greater than 5 per week)  [facility], poultry, or fish  Smoked fish and meats  Salted legumes, nuts, seeds, or nut/seed butters  Meat alternatives with high levels of sodium (>300 mg per serving) or saturated fat (>5 g per serving)   Dairy Whole milk,?2% fat milk, or Buttermilk  Cream, half-&-half  Cream cheese, sour cream  Regular and processed cheese or sauces  Regular-sodium cottage cheese   Vegetables Canned or frozen vegetables with salt, fresh vegetables prepared with salt, butter, cheese, or cream sauce  Pickled vegetables such as olives, pickles, or sauerkraut  Tomato or pasta sauce with high levels of salt (>300 mg per serving)  Brand [place] vegetables Fruits None   Oils Solid shortening or partially hydrogenated oils  Solid margarine made with hydrogenated or partially hydrogenated oils or trans fat  Salted margarine that contains trans fats  Butter (salted or unsalted)  Salad dressings with trans fat or high levels of sodium ([place], blue cheese, [place], [place])  Tropical oils (coconut, palm, palm kernel oils)   Other Sugary and/or fatty desserts, candy, and other sweets  Canned soups that are >600 mg of sodium  Frozen meals and prepared sides that are >600 mg of sodium  Store-bought egg beaters (with added sodium)  Salts: sea salt, kosher salt, onion salt, and garlic salt, seasoning mixes containing salt  Flavorings: bouillon cubes, catsup or ketchup, barbeque sauce, Worcestershire sauce, soy sauce, salsa, relish, teriyaki sauce     Heart-Healthy - Reduced Sodium Vegan 1-Day Sample Menu View Nutrient Info   Breakfast 1 slice whole wheat toast   2 tablespoons peanut butter without salt   Tofu scramble made with: ½ cup calcium-set tofu   ½ cup green pepper   ½ cup spinach   ½ cup tomatoes   ½ cup white mushrooms   1 teaspoon canola oil   1 cup soymilk fortified with calcium, vitamin B12, and vitamin D   Lunch 1 cup reduced sodium split pea soup   1 whole wheat dinner roll   1 medium apple    Dinner Salad made with: 1 cup lentils   ½ cup cooked broccoli   ½ cup cooked carrots   2 tablespoons hummus   1 cup sliced strawberries   1 cup soymilk fortified with calcium, vitamin B12, and vitamin D   Evening Snack 1 cup soy yogurt   ¼ cup mixed nuts   Daily Sum   Nutrient Unit Value   Macronutrients   Energy kcal 1672   Energy kJ 7000   Protein g 86   Total lipid (fat) g 65   Carbohydrate, by difference g 205   Fiber, total dietary g 47   Sugars, total g 73   Minerals   Calcium, Ca mg 1443   Iron, Fe mg 19   Sodium, Na mg 1148   Vitamins   Vitamin C, total ascorbic acid mg 253   Vitamin A, IU IU 61892   Vitamin D    Lipids   Fatty acids, total saturated g 11 Fatty acids, total monounsaturated g 29   Fatty acids, total polyunsaturated g 19   Cholesterol mg 5     Heart-Healthy - Reduced Sodium Vegetarian (Lacto-Ovo) Sample 1-Day Menu View Nutrient Info   Breakfast 1 cup cooked oatmeal   1 tablespoon ground flaxseed   1 cup blueberries   2 scrambled egg whites with 1 teaspoon canola oil   2 tablespoons salsa   1 cup fat-free milk   1 cup coffee   Oil, canola   Lunch 2 slices whole wheat bread   2 tablespoons peanut butter without salt   1 small banana   6 ounces fat-free plain yogurt   1 cup sliced red pepper   2 tablespoons hummus   1 cup fat-free milk   Evening Meal Stir rizzo made with: ½ cup tofu   1 cup brown rice   ½ cup cooked broccoli   ½ cup cooked carrots   ½ cup cooked green beans   1 teaspoon peanut oil   Evening Snack 1 slice low-fat mozzarella cheese   1 medium apple   Daily Sum   Nutrient Unit Value   Macronutrients   Energy kcal 1764   Energy kJ 7375   Protein g 87   Total lipid (fat) g 53   Carbohydrate, by difference g 254   Fiber, total dietary g 35   Sugars, total g 98   Minerals   Calcium, Ca mg 1609   Iron, Fe mg 12   Sodium, Na mg 1434   Vitamins   Vitamin C, total ascorbic acid mg 210   Vitamin A, IU IU 43623   Vitamin D    Lipids   Fatty acids, total saturated g 12   Fatty acids, total monounsaturated g 21   Fatty acids, total polyunsaturated g 15   Cholesterol mg 31     Heart-Healthy Eating Sample 1-Day Menu View Nutrient Info   Breakfast 1 cup oatmeal   1 cup fat-free milk   1 cup blueberries   1 cup brewed coffee   1 ounce almonds   Lunch 2 slices whole-wheat bread   2 oz lean deli turkey breast   1 oz low-fat Swiss cheese   2 slices tomato   2 lettuce leaves   1 pear   1 cup skim milk   Afternoon Snack 1 oz trail mix (with nuts, seeds, raisins)   Evening Meal 3 oz broiled salmon   2/3 cup brown rice   1 tsp margarine   1/2 cup cooked broccoli   1/2 cup cooked carrots   1 cup tossed salad   1 teaspoon olive oil and vinegar dressing   1 small whole-wheat roll   1 tsp margarine   1 cup tea   Evening Snack 1 banana   Daily Sum   Nutrient Unit Value   Macronutrients   Energy kcal 2069   Energy kJ 8655   Protein g 146   Total lipid (fat) g 69   Carbohydrate, by difference g 228   Fiber, total dietary g 33   Sugars, total g 85   Minerals   Calcium, Ca mg 1292   Iron, Fe mg 14   Sodium, Na mg 1751   Vitamins   Vitamin C, total ascorbic acid mg 85   Vitamin A, IU IU 04194   Vitamin D    Lipids   Fatty acids, total saturated g 12   Fatty acids, total monounsaturated g 27   Fatty acids, total polyunsaturated g 24   Cholesterol mg 270

## 2021-03-20 NOTE — PROGRESS NOTES
Called report to Vikram Wynn RN in MICU. Patients sister is updated with his condition and the plan of care.

## 2021-03-20 NOTE — PROGRESS NOTES
Subjective: The patient is awake and alert. He remains on BiPAP FiO2 100%, desaturates quickly once BiPAP taken off  Clinically denies any acute complaints    Objective:    BP 99/63   Pulse 98   Temp 97 °F (36.1 °C) (Temporal)   Resp 29   Ht 6' 1\" (1.854 m)   Wt 233 lb (105.7 kg)   SpO2 91%   BMI 30.74 kg/m²     In: 120 [P.O.:120]  Out: -   In: 120   Out: -     General appearance: NAD, conversant  HEENT: AT/NC, MMM  Neck: FROM, supple  Lungs: Diminished breath sounds diffusely  CV: RRR, no MRGs  Vasc: Radial pulses 2+  Abdomen: Soft, non-tender; no masses or HSM  Extremities: No peripheral edema or digital cyanosis  Skin: no rash, lesions or ulcers  Psych: Alert and oriented to person, place and time  Neuro: Alert and interactive     Recent Labs     03/19/21  1210   WBC 5.1   HGB 14.9   HCT 47.8          Recent Labs     03/19/21  1210 03/20/21  0659    140   K 4.9 5.8*    103   CO2 29 30*   BUN 30* 39*   CREATININE 1.8* 2.1*   CALCIUM 9.4 9.0       Assessment:    Principal Problem:    Acute on chronic systolic congestive heart failure (HCC)  Active Problems:    Nonischemic cardiomyopathy (HCC)    S/P ICD (internal cardiac defibrillator) procedure    Essential hypertension    Chronic anticoagulation    Permanent atrial fibrillation (HCC)    Stage 3 chronic kidney disease    Chronic obstructive pulmonary disease (HCC)    Acute on chronic respiratory failure (HCC)  Resolved Problems:    * No resolved hospital problems. *      Plan:  Admitted for acute hypoxemic respiratory failure     he arrived to ED 99%/15 L NRB, ? Actual O2 requirements, still 98%/BiPAP breathing 100% FiO2.   Desaturate significantly into the 70s once BiPAP was removed  Unable to to check CTA secondary to renal function however will get VQ scan and check D-dimer-as PE is high on the differential given his hypotension and hypoxemia  Decrease diuresis with IV Lasix given ANKUR -repeat BnP tomorrow     Wean O2 and BiPAP

## 2021-03-21 NOTE — CONSULTS
[facility]  Internal Medicine Residency Program  History and Physical  MICU    Patient:  Gabriele Silva 66 y.o. male MRN: [id number]     Date of Service: 3/21/2021    Hospital Day: 3      Chief complaint: Transfer from floor; Concern for hypoxia and hypotension  History of Present Illness   Gabriele Silva is a 66 y.o. male who is a transfer from the general floor for concerns regarding hypotension of hypoxia. Patient is unable to tolerate weaning oxygen and he drops down to 70% when taken off oxygen for a few minutes to eat. On arrival to the MICU patient is on BiPAP and maintaining saturations at 96%. When BiPAP is taken off and patient is placed on 3 L nasal cannula (his home oxygen) patient desaturates. Patient was admitted on 3/19/2021 with chief complaint of shortness of breath. Patient reports he has been feeling shortness of breath and dyspnea on exertion worsened in the last 1 to 2 weeks. He reports he has been feeling constant shortness breath, especially when he gets in and out of his truck. He reports he has been limiting his walking because of symptoms. He reports he has been waking up in the middle the night feeling short of breath recently. He reports he does have shortness of breath chronically however it has worsened in the last 1 to 2 weeks. Denies orthopnea. Patient is also noted that his legs are more swollen than usual.  Denies any pain. Denies any fevers or chills. Denies any sick contacts. Of note, patient is a vague historian and his answers often change. Patient reports he does not follow any particular diet. Typically does not follow a low-sodium diet. He cooks for himself. Lives by himself. He reports he eats whatever he can make. He notes that he is compliant with his medications.     Patient currently denies any chest pain, palpitations, nausea/vomiting, change in bowel habits, urinary symptoms including dysuria, frequency, hesitancy or paresthesias of bilateral upper and lower extremities. Patient has a significant past medical history concerning for essential hypertension and pulmonary hypertension, paroxysmal atrial fibrillation on warfarin with current INR of 2.7, history of episodes of V. tach, HFrEF, nonischemic cardiomyopathy, with the last EF in 2017 to 25%, status post ICD in 2011, chronic respiratory failure with history of COPD on 3 L supplemental oxygen at baseline and CKD. Past Medical History:      Diagnosis Date    Arrhythmia     Atrial fibrillation ([facility] [address]     CHF (congestive heart failure) (HCC)     CKD (chronic kidney disease)     Hyperlipidemia     Hypertension     Nonischemic cardiomyopathy ([facility] [address]     Thyroid disease        Past Surgical History:        Procedure Laterality Date    CARDIAC DEFIBRILLATOR PLACEMENT  6/21/11    HERNIA REPAIR         Medications Prior to Admission:    Prior to Admission medications    Medication Sig Start Date End Date Taking?  Authorizing Provider   metoprolol succinate (TOPROL XL) 50 MG extended release tablet Take 75 mg by mouth daily Pt to take 50mg in the morning and 25mg in the evening    Historical Provider, MD   finasteride (PROSCAR) 5 MG tablet Take 5 mg by mouth daily    Historical Provider, MD MENARDVEN 2 MG tablet 2mg daily x 5 days per week 7/7/19   Historical Provider, MD GREENE 3 MG tablet 3mg 2 days per week 8/5/19   Historical Provider, MD   isosorbide mononitrate (IMDUR) 30 MG extended release tablet Take 1 tablet by mouth daily 5/8/19   Chauncey Malhotra MD   acetaminophen (TYLENOL) 500 MG tablet Take 500 mg by mouth as needed for Pain     Historical Provider, MD   ammonium lactate (LAC-HYDRIN) 12 % lotion Apply topically as needed  5/17/17   Historical Provider, MD   aspirin 81 MG tablet Take 81 mg by mouth daily    Historical Provider, MD   furosemide (LASIX) 40 MG tablet Take 20 mg by mouth daily     Historical Provider, MD   levothyroxine (SYNTHROID) 125 MCG tablet Take 125 mcg by mouth daily  4/18/16   Historical Provider, MD   pravastatin (PRAVACHOL) 20 MG tablet Take 20 mg by mouth daily  2/26/16   Historical Provider, MD   OXYGEN Inhale 4 L into the lungs continuous     Historical Provider, MD   ferrous sulfate 325 (65 FE) MG tablet Take 325 mg by mouth daily (with breakfast). Historical Provider, MD   allopurinol (ZYLOPRIM) 100 MG tablet Take 100 mg by mouth 2 times daily. Historical Provider, MD   VITAMIN D, CHOLECALCIFEROL, PO Take 2,000 Units by mouth daily     Historical Provider, MD   tiotropium (SPIRIVA) 18 MCG inhalation capsule Inhale 18 mcg into the lungs daily. Historical Provider, MD       Allergies:  Patient has no known allergies. Social History:   TOBACCO:   reports that he quit smoking about 15 years ago. His smoking use included cigarettes. He has a 80.00 pack-year smoking history. He has never used smokeless tobacco.  ETOH:   reports previous alcohol use. Family History:       Problem Relation Age of Onset    Cancer Father     Cancer Sister        REVIEW OF SYSTEMS:    · Constitutional: No fever, no chills, no change in weight; good appetite  · HEENT: No blurred vision, no ear problems, no sore throat, no rhinorrhea. · Respiratory: No cough, no sputum production, no pleuritic chest pain, no shortness of breath  · Cardiology: No angina, +dyspnea on exertion, +paroxysmal nocturnal dyspnea, no orthopnea, no palpitation, +leg swelling. · Gastroenterology: No dysphagia, no reflux; no abdominal pain, no nausea or vomiting; no constipation or diarrhea.  No hematochezia   · Genitourinary: No dysuria, no frequency, hesitancy; no hematuria  · Musculoskeletal: no joint pain, no myalgia, no change in range of movement  · Neurology: no focal weakness in extremities, no slurred speech, no double vision, no tingling or numbness sensation  · Endocrinology: no temperature intolerance, no polyphagia, polydipsia or polyuria  · Hematology: no increased bleeding, no bruising, no lymphadenopathy  · Skin: no skin changes noticed by patient  · Psychology: no depressed mood, no suicidal ideation    Physical Exam   · Vitals: /81   Pulse 100   Temp 96.7 °F (35.9 °C) (Temporal)   Resp 24   Ht 6' 1\" (1.854 m)   Wt 233 lb (105.7 kg)   SpO2 93%   BMI 30.74 kg/m²   ·    ·    · General Appearance: alert and oriented to person, place and time, well developed and well- nourished, in mild to moderate acute distress  · Skin: cold and wet, no rash or erythema  · Head: normocephalic and atraumatic  · Eyes: pupils equal, round, and reactive to light, extraocular eye movements intact, conjunctivae normal  · ENT: tympanic membrane, external ear and ear canal normal bilaterally, nose without deformity, nasal mucosa and turbinates normal without polyps  · Neck: supple and non-tender without mass, no thyromegaly or thyroid nodules, no cervical lymphadenopathy  · Pulmonary/Chest: distant lung sounds, no wheezing, mild rhonchi. On BIPAP. · Cardiovascular: irregular rate, irregular rhythm, Grade 2 JOHN, normal S1 and S2, no rubs, clicks, or gallops, distal pulses intact, no carotid bruits, +JVD  · Abdomen: soft, non-tender, +distended, normal bowel sounds, no masses or organomegaly   · Extremities: no cyanosis, clubbing, +2 pitting edema b/l LE from ankles to mid thigh.    · Musculoskeletal: normal range of motion, no joint swelling, deformity or tenderness  · Neurologic: reflexes normal and symmetric, no cranial nerve deficit, gait, coordination and speech normal     Lines     site day    Art line   L Radial 3/21/2021   TLC None    PICC None    Hemoaccess None    Oxygen:     BIPAP  ABG:     Lab Results   Component Value Date    PH 7.331 03/21/2021    PCO2 49.2 03/21/2021    PO2 68.7 03/21/2021    HCO3 25.4 03/21/2021    BE -1.2 03/21/2021    THB 16.1 03/21/2021    O2SAT 92.1 03/21/2021     Labs and Imaging Studies   Basic Labs  CBC:   Lab Results   Component Value Date    WBC 5.3 03/20/2021    RBC 4.84 03/20/2021    HGB 14.6 03/20/2021    HCT 46.7 03/20/2021    MCV 96.5 03/20/2021    RDW 18.4 03/20/2021     03/20/2021     CMP:  Lab Results   Component Value Date     03/20/2021    K 5.7 03/20/2021     03/20/2021    CO2 26 03/20/2021    BUN 45 03/20/2021    PROT 6.4 03/20/2021       Imaging Studies:     Xr Chest Portable    Result Date: 3/20/2021  EXAMINATION: ONE XRAY VIEW OF THE CHEST 3/20/2021 10:45 pm COMPARISON: March 19 HISTORY: ORDERING SYSTEM PROVIDED HISTORY: sob TECHNOLOGIST PROVIDED HISTORY: Reason for exam:->sob What reading provider will be dictating this exam?->CRC FINDINGS: Cardiac silhouette is enlarged. Pacemaker-AICD in place. Contour bulge in the left suprahilar region suspicious for mass or adenopathy. Increased interstitial markings, likely chronic. No evidence of airspace consolidation or pleural effusions. The pulmonary vasculature is within normal limits. Contour bulge in the left suprahilar region suspicious for mass or adenopathy. Recommend further evaluation with CT of the chest. Cardiomegaly with dual-chamber pacemaker-AICD in place. Increased interstitial markings, likely chronic. Xr Chest Portable    Result Date: 3/19/2021  EXAMINATION: ONE XRAY VIEW OF THE CHEST 3/19/2021 12:21 pm COMPARISON: None. HISTORY: ORDERING SYSTEM PROVIDED HISTORY: sob TECHNOLOGIST PROVIDED HISTORY: Reason for exam:->sob What reading provider will be dictating this exam?->CRC FINDINGS: The heart is enlarged. Note is made of a dual lead cardiac pacer. Patchy bilateral lower lobe airspace disease is noted. The findings could represent CHF or pneumonia. There is no pleural effusion. Patchy bilateral lower lobe airspace disease. Given the marked cardiomegaly findings could represent CHF or pneumonia.        EKG: atrial fibrillation, rate 117    Resident's Assessment and Plan     Becky Nelson is a 66 y.o. male with significant past medical history concerning for essential hypertension and pulmonary hypertension, paroxysmal atrial fibrillation on warfarin with current INR of 2.7, history of episodes of V. tach, HFrEF, nonischemic cardiomyopathy, with the last EF in 2017 to 25%, status post ICD in 2011, chronic respiratory failure with history of COPD on 3 L supplemental oxygen at baseline and CKD Stage 3b currently in the MICU being managed for:     Hospital Day: 3  MICU Day: 1-2    Neurology: AAOx3   Cardiology:   ADHF on chronic HFrEF vs Cardiogenic Shock   Hx of Valvular heart disease   History of hypertension, pulmonary hypertension  History of permanent atrial fibrillation, on warfarin INR 2.7. Hx of episodes of V.tach  Nonischemic cardiomyopathy S/p ICD 7/1/2011 [employer])  Hypercholesterolemia   Elevated Troponin likely 2/2 CKD Stage 3b (will trend x2)     · On initial physical exam, pt fits 'cold and wet' picture (associated with Todd Class IV). Extremities are cold. Significant pitting edema from mid thigh to ankles b/l +2. CXR with mild pulmonary edema (pronounced vasculature) despite no pleural effusions. · proBNP 14,608. · Lasix trial with 20 mg IV, patient made >400 cc UO. · Started on Bumex ggt. Monitor daily weight, I/Os, BP and renal Fx. · Pt also had to be started on Levophed to maintain MAP>65. Wean Levophed as tolerated. · Recommend decreasing MAP goal to MAP >60. · Pt may benefit from digoxin as he is in Afib w/ RVR + hypotension. Currently HR is <120; however if increase, will consider. · Hold home metoprolol and isosorbide in setting of Hypotension requiring Levophed. · Continue Warfarin to INR goal 2- 2.5. Pharmacy to dose warfarin. Help appreciated. · Of note, pt hydralazine and spironolactone has been on hold last few months in the setting of being normotensive. No ACE/ARN/Entresto 2/2 pt having CKD. · Continue pravachol 20 mg daily. · Cardiology following.  Hear failure Nurse/ Coordinator following. Recs appreciated. Pulmonary:   Acute on chronic respiratory failure 2/2 pulmonary edema vs cardiogenic shock  COPD on 3 L NC chronically   · Unlikely PE causing patients symptoms as his D-dimer is <300 and on presentation he was not been tachycardic and more significantly he has an INR of 2.7 on warfarin. · Will hold V/Q scan. Pt CXR is significant for   Contour bulge in the left suprahilar region suspicious for mass or   adenopathy.  Recommend further evaluation with CT of the chest.   · Recommend CT chest wo contrast to evaluate. Pt currently unable to lay flat at this moment (orthopnea). Will diurese patient first.   · Pt had PFTs done in 2016 with FEV1 1.46. Nephrology (Fluids/ Electrolytes & Nutrition): ANKUR on CKD Stage 3b   · Baseline Cr 1.7. · Fe Urea 20.7% suggesting prerenal ANKUR. · This AM Cr 2.1  · Pt is making urine. · Balance between diuresis and fluids. Currently will diuresis with Bumex ggt. Hyperkalemia  · Will treat with Calcium gluconate, insulin and Lasix. · Pt will be diuresed, so K will shift. · BMP q8h. Gastroenterology:   Distended abdomen   KUB with Nonspecific nonobstructive bowel gas pattern. Genitourinary:   Elevated PSA   · 12/2/2020 with PSA 27.01   · Will obtain diagnostic PSA. Traumatic Cohen 3/20/2021  · Urology consulted. Reccs appreciated. Infectious Disease:   Doubt sepsis/septic shock  Leukocytosis  · WBC has increased from 5 -->12   · Will follow Blood Cx x2. UA + dark yellow urine, Large blood, Modrate Bacteria (WBC 1-3). F/u Urine Cx. F/u Resp panel. F/u legionella antigen, strep pneumoniae antigen. COVID -.   · ESR 1. CRP 2.7. Procalcitonin 0.46. · Will discuss starting Abx. Next of Kin/ POA:   [place] Brother/Sister [phone] REL1     Code Status:   Full Code     PT/OT evaluation: not indicated at this time.    DVT prophylaxis/ GI prophylaxis: Warfarin/ ppi  Disposition: MICU [facility], PGY-1   Attending physician: Dr. Luz Marina Hernandez    I personally saw, examined and provided care for the patient. Radiographs, labs and medication list were reviewed by me independently. Review of Residents documentation was conducted and revisions were made as appropriate. I agree with the above documented exam, problem list and plan of care.         CCT excluding procedures 37 minutes    Sammy Almazan

## 2021-03-21 NOTE — PROGRESS NOTES
Date: 3/21/2021    Time: 12:07 AM    Patient Placed On BIPAP/CPAP/ Non-Invasive Ventilation? No    If no must comment. Facial area red/color change? no          If YES are Blister/Lesion present? No   If yes must notify nursing staff  BIPAP/CPAP skin barrier?   No    Skin barrier type:on total face mask      Comments:        Clara Glover

## 2021-03-21 NOTE — PROGRESS NOTES
[facility]        CARDIOLOGY                 INPATIENT PROGRESS NOTE          PATIENT SEEN IN FOLLOW UP FOR: CHF    Hospital Day: [address] is a 66year old patient known to Dr. Corinna Langley, Dr Valencia Gene: Denies any chest pain; Still SOB, No Orthopnea. No dizzy     ROS: Review of rest of 10 systems negative except as mentioned above    OBJECTIVE: No acute distress. See Assessment     Diagnostics:       Telemetry: Reviewed          Intake/Output Summary (Last 24 hours) at 3/21/2021 0752  Last data filed at 3/21/2021 0600  Gross per 24 hour   Intake 360 ml   Output 1150 ml   Net -790 ml       Labs:   CBC:   Recent Labs     03/20/21 2140 03/21/21  0454   WBC 5.3 12.1*   HGB 14.6 14.3   HCT 46.7 45.1    136     BMP:   Recent Labs     03/20/21  2140 03/21/21  0454    138   K 5.7* 5.4*   CO2 26 23   BUN 45* 44*   CREATININE 2.2* 2.1*   LABGLOM 35 37   CALCIUM 9.4 9.6     Mag:   Recent Labs     03/20/21  0659 03/21/21  0454   MG 2.6 2.3     Phos:   Recent Labs     03/21/21  0454   PHOS 3.8     TSH: No results for input(s): TSH in the last 72 hours. HgA1c:     BNP: No results for input(s): BNP in the last 72 hours.   PT/INR:   Recent Labs     03/20/21  1706 03/21/21  0630   PROTIME 28.7* 31.5*   INR 2.6 2.9     APTT:  Recent Labs     03/19/21  1210   APTT 43.8*     CARDIAC ENZYMES:  Recent Labs     03/19/21  1210 03/21/21  0454   CKTOTAL  --  63   CKMB  --  4.2   TROPONINI 0.04* 0.04*     FASTING LIPID PANEL:  Lab Results   Component Value Date    CHOL 99 03/20/2021    HDL 39 03/20/2021    LDLCALC 50 03/20/2021    TRIG 48 03/20/2021     LIVER PROFILE:  Recent Labs     03/19/21  1210 03/20/21  2140   AST 23 21   ALT 15 15   LABALBU 3.4* 3.1*       Current Inpatient Medications:   metoprolol        lactulose  20 g Oral TID    allopurinol  100 mg Oral BID    aspirin  81 mg Oral Daily    ferrous sulfate  325 mg Oral Daily with breakfast    finasteride  5 mg Oral Daily    isosorbide mononitrate  30 mg Oral Daily    levothyroxine  125 mcg Oral Daily    [Held by provider] metoprolol succinate  75 mg Oral Daily    pravastatin  20 mg Oral Daily    ipratropium-albuterol  1 ampule Inhalation Q4H WA    sodium chloride flush  10 mL Intravenous 2 times per day    [Held by provider] furosemide  40 mg Intravenous BID    [START ON 3/23/2021] warfarin  3 mg Oral Once per day on     warfarin  2 mg Oral Once per day on Sun Mon Wed Fri Sat       IV Infusions (if any):   dextrose      bumetanide 0.1 mg/mL infusion 0.5 mg/hr (21 0536)    norepinephrine 1 mcg/min (21 0606)         PHYSICAL EXAM:     CONSTITUTIONAL:   BP 98/64   Pulse 113   Temp 96.7 °F (35.9 °C) (Temporal)   Resp 30   Ht 6' 1\" (1.854 m)   Wt 233 lb (105.7 kg)   SpO2 98%   BMI 30.74 kg/m²   Pulse  Av.9  Min: 82  Max: 361  Systolic (52KSB), NTC:02 , Min:83 , KEF:330    Diastolic (36ZUN), VYO:16, Min:56, Max:85        In general, this is a well developed, well nourished who appears stated age. awake, On BiPAP, alert, cooperative, mild respiratory distress     HEENT: eyes -conjunctivae pink,   Neck-  no stridor, no carotid bruit. no jugular venous distention   RESPIRATORY: Chest symmetrical and non-tender to palpation. No accessory muscles use. Lung auscultation -  few rhonchi  CARDIOVASCULAR:     Heart Inspection shows no noted pulsations  Heart Palpation - no palpable thrills  Heart Ausculation - Irregular rate and rhythm, 2/6 systolic murmur, No s3 or rub.   + lower extremity edema, no varicosities. Distal pulses palpable, no clubbing or cyanosis   ABDOMEN: Soft, nontender,  Bowel sounds present. MS: n/a.   : Deferred  Rectal Exam: Deferred  SKIN: warm and dry  NEURO / PSYCH: oriented to person, place           Impression/Recommendations:     Acute on chronic HFrEF - Continue diuretics, monitor wts, I/Os, BP and renal Fn     Acute respiratory failure - On BiPAP, Being transferred to ICU when bed available     Hypotension - Hold Metoprolol, Isosorbide     Borderline elevated Troponin - Due to CKD, hypoxemia -No raise or fall     A fib - On Coumadin for [address], INR therapeutic     Nonischemic CMP - On Metoprolol, Isosorbide, Add Hydralazine if his BP tolerates - No ACE/ARN/Entresto due to CKD     S/p ICD in 2011     VHD     Pulmonary HTN           Above recommendations discussed with him.             Electronically signed by Sena Tran MD on 3/21/2021   [facility]) Cardiology

## 2021-03-21 NOTE — CONSULTS
3/21/2021 9:47 AM  Service: Urology  Group: [facility] ([facility])    Natalie Johnson  [id number]     Chief Complaint:    Evaluate for possible malpositioned catheter versus trauma     History of Present Illness: The patient is a 66 y.o. male patient who presented to the hospital on 3/19/21 with complaints of dyspnea. He was admitted to the ICU on 3/20/21 for acute hypoxic respiratory failure. Per nursing this morning they noticed bloody urine with minimal output. Nursing attempted to irrigate Cohen catheter and began leaking around the Cohen catheter site. They were instructed to remove the Cohen catheter at that time. To my knowledge no frequency urgency or incomplete bladder emptying sensation no gross hematuria or dysuria prior to admission  Patient had a Cohen catheter inserted last night he was leaking blood around at the catheter was not draining well it was deemed a malpositioned Cohen      He is a patient with [facility]. He follows with Dr Elo Sumner. He has a history of elevated PSA. His most recent PSA In December was 27.  I am unsure if he has ever undergone biopsy of the prostate   Past Medical History:   Diagnosis Date    Arrhythmia     Atrial fibrillation ([address]     CHF (congestive heart failure) (HCC)     CKD (chronic kidney disease)     Hyperlipidemia     Hypertension     [facility]     Thyroid disease          Past Surgical History:   Procedure Laterality Date    CARDIAC DEFIBRILLATOR PLACEMENT  6/21/11    HERNIA REPAIR         Medications Prior to Admission:    Medications Prior to Admission: metoprolol succinate (TOPROL XL) 50 MG extended release tablet, Take 75 mg by mouth daily Pt to take 50mg in the morning and 25mg in the evening  finasteride (PROSCAR) 5 MG tablet, Take 5 mg by mouth daily  JANTOVEN 2 MG tablet, 2mg daily x 5 days per week  JANTOVEN 3 MG tablet, 3mg 2 days per week  isosorbide mononitrate (IMDUR) 30 MG extended release tablet, Take 1 tablet by mouth daily  acetaminophen (TYLENOL) 500 MG tablet, Take 500 mg by mouth as needed for Pain   ammonium lactate (LAC-HYDRIN) 12 % lotion, Apply topically as needed   aspirin 81 MG tablet, Take 81 mg by mouth daily  furosemide (LASIX) 40 MG tablet, Take 20 mg by mouth daily   levothyroxine (SYNTHROID) 125 MCG tablet, Take 125 mcg by mouth daily   pravastatin (PRAVACHOL) 20 MG tablet, Take 20 mg by mouth daily   OXYGEN, Inhale 4 L into the lungs continuous   ferrous sulfate 325 (65 FE) MG tablet, Take 325 mg by mouth daily (with breakfast). allopurinol (ZYLOPRIM) 100 MG tablet, Take 100 mg by mouth 2 times daily. VITAMIN D, CHOLECALCIFEROL, PO, Take 2,000 Units by mouth daily   tiotropium (SPIRIVA) 18 MCG inhalation capsule, Inhale 18 mcg into the lungs daily. Allergies:    Patient has no known allergies. Social History:    reports that he quit smoking about 15 years ago. His smoking use included cigarettes. He has a 80.00 pack-year smoking history. He has never used smokeless tobacco. He reports previous alcohol use. He reports that he does not use drugs. Family History:   Non-contributory to this Urological problem  family history includes Cancer in his father and sister. Review of Systems:  Constitutional: No fever or chills   Respiratory: negative for cough and hemoptysis. COPD bronchospastic lung disease. .  Current acute on chronic respiratory failure cardiovascular: negative for chest pain and dyspnea chronic atrial fibrillation with anticoagulation and an internal cardiac defibrillator. Stable coronary artery disease. Nonischemic cardiomyopathy. History of arrhythmias treated with medication  Gastrointestinal: negative for abdominal pain, diarrhea, nausea and vomiting.   Previous inguinal hernia repair  Derm: negative for rash and skin lesion(s)  Neurological: negative for seizures and tremors  Musculoskeletal: Negative    Psychiatric: Negative   Endocrine hyperlipidemia hypertension and hypothyroidism  Nephrology; chronic renal insufficiency      Physical Exam:     Vitals:  /61   Pulse 115   Temp 97.2 °F (36.2 °C) (Temporal)   Resp 29   Ht 6' 1\" (1.854 m)   Wt 233 lb (105.7 kg)   SpO2 96%   BMI 30.74 kg/m²     General:  Awake, alert, oriented X 3. No apparent distress. HEENT:  Normocephalic, atraumatic. Lungs:  Respirations symmetric and non-labored. Abdomen:  soft, nontender, no masses  Extremities:  No clubbing, cyanosis, or edema  Skin:  Warm and dry, no open lesions or rashes  Neuro: There are no motor or sensory deficits in the 4 quadrant extremities   Rectal: deferred  Genitourinary: Circumcised testes epididymis and cord normal    Labs:     Recent Labs     03/19/21  1210 03/20/21  2140 03/21/21  0454   WBC 5.1 5.3 12.1*   RBC 4.81 4.84 4.60   HGB 14.9 14.6 14.3   HCT 47.8 46.7 45.1   MCV 99.4 96.5 98.0   MCH 31.0 30.2 31.1   MCHC 31.2* 31.3* 31.7*   RDW 18.9* 18.4* 18.0*    153 136   MPV 12.0 12.2* 12.2*         Recent Labs     03/20/21  0659 03/20/21  2140 03/21/21  0454   CREATININE 2.1* 2.2* 2.1*       Imaging:       Assessment/plan:  Gross hematuria  Hx of Elevated PSA     I was able to place a Glidewire into the bladder and then a [place] tip catheter over this the urine was obtained for culture of the residual however was only about 100 cc. There was no gross bleeding. I think his gross hematuria was from the malpositioned Cohen.   Certainly a later time cystoscopy could be done

## 2021-03-21 NOTE — PLAN OF CARE
Problem: Falls - Risk of:  Goal: Will remain free from falls  Description: Will remain free from falls  Outcome: Met This Shift  Goal: Absence of physical injury  Description: Absence of physical injury  Outcome: Met This Shift     Problem: OXYGENATION/RESPIRATORY FUNCTION  Goal: Patient will maintain patent airway  Outcome: Met This Shift  Goal: Patient will achieve/maintain normal respiratory rate/effort  Description: Respiratory rate and effort will be within normal limits for the patient  Outcome: Met This Shift     Problem: ACTIVITY INTOLERANCE/IMPAIRED MOBILITY  Goal: Mobility/activity is maintained at optimum level for patient  Outcome: Met This Shift     Problem: Skin Integrity:  Goal: Will show no infection signs and symptoms  Description: Will show no infection signs and symptoms  Outcome: Met This Shift  Goal: Absence of new skin breakdown  Description: Absence of new skin breakdown  Outcome: Met This Shift

## 2021-03-21 NOTE — PROGRESS NOTES
Adela rounding on patient, noticed he had minimal bloody urine output with bloody drainage around insertion site. Discussed with Dr. Lb Vail. Roman cath flushed with 60 ml NS with return of 40 ml bloody urine, with significant leakage around insertion site. Deflated balloon and attempted to repostion roman without success. Dr. Lb Vail notified; order received to consult urology. Dr. Lashon Contreras [place] notified of consult via perfect serve phone notification/answering service. Gaurang May RN  3/21/2021  9:01 AM    Dr. Jenna Katz returned call with verbal orders to remove roman, and he will be here as soon as able.  cart ordered for bedside. Roman removed, pt VS remain stable. Advised/Educated patient on use of urinal in the mean time and for some bleeding to be normal at this time.      Gaurang May RN  3/21/2021  9:29 AM

## 2021-03-21 NOTE — PROGRESS NOTES
Subjective: The patient is awake and alert. Looks and feels better today  Still on BiPAP, unable to wean as of now  IV Bumex drip underway  Clinically feels much better    Objective:    /61   Pulse 114   Temp 97.2 °F (36.2 °C) (Temporal)   Resp 24   Ht 6' 1\" (1.854 m)   Wt 233 lb (105.7 kg)   SpO2 93%   BMI 30.74 kg/m²     In: -   Out: 1150   In: -   Out: 1150 [Urine:1150]    General appearance: NAD, conversant through BiPAP  HEENT: Remains on BiPAP AT/NC, MMM  Neck: FROM, supple  Lungs: Diminished breath sounds diffusely, rales bilateral lung fields  CV: RRR, no MRGs  Vasc: Radial pulses 2+  Abdomen: Soft, non-tender; no masses or HSM  Extremities: Bilateral lower extremity edema  Skin: no rash, lesions or ulcers  Psych: Alert and oriented to person, place and time  Neuro: Alert and interactive     Recent Labs     03/19/21  1210 03/20/21  2140 03/21/21  0454   WBC 5.1 5.3 12.1*   HGB 14.9 14.6 14.3   HCT 47.8 46.7 45.1    153 136       Recent Labs     03/20/21  0659 03/20/21  2140 03/21/21  0454    140 138   K 5.8* 5.7* 5.4*    105 105   CO2 30* 26 23   BUN 39* 45* 44*   CREATININE 2.1* 2.2* 2.1*   CALCIUM 9.0 9.4 9.6       Assessment:    Principal Problem:    Acute on chronic systolic congestive heart failure (HCC)  Active Problems:    Nonischemic cardiomyopathy (HCC)    S/P ICD (internal cardiac defibrillator) procedure    Essential hypertension    Chronic anticoagulation    Permanent atrial fibrillation (HCC)    Stage 3 chronic kidney disease    Chronic obstructive pulmonary disease (HCC)    Acute on chronic respiratory failure (HCC)  Resolved Problems:    * No resolved hospital problems.  *      Plan:  Admitted for acute hypoxemic respiratory failure secondary to massive volume overload  Transferred to medical intensive care unit yesterday late afternoon/early evening secondary to hypotension/hypoxemia  Currently 93%/ on BiPAP with 90% FiO2  Desaturate significantly into the 70s once BiPAP was removed  D-dimer is only 287-discontinue VQ scan ordered, patient is therapeutically anticoagulated with INR of 2.6  On Bumex drip per ICU team -agree  On pressor support     R/o COVID-negative  Pancultures-negative to date, on empiric antibiotics secondary to elevated procalcitonin     Echo (none since 2018)-pending     Cards following     Continue metoprolol and other cardiac meds -with parameters secondary to low BP     Continue warfarin     Check orthostatics once practical    PSA elevated at 7.05    DVT Prophylaxis   PT/OT  Discharge planning     Shawn Caruso MD  11:32 AM  3/21/2021

## 2021-03-21 NOTE — PROCEDURES
Arterial line placement    Procedure: Left radial arterial line placement. Indications: Continuous monitoring of blood pressure in a patient with hypotension +/- shock, on Levophed. Anesthesia: Local infiltration of 1% lidocaine. Consent:  The patient was counseled regarding the procedure, its indications, risks, potential complications and alternatives, and any questions were answered. Consent was obtained to proceed. Technique: Time Out: Immediately prior to the procedure a \"timeout\" was called to verify the correct patient and procedure. Procedure was done using strict aseptic technique. Ramon's test was performed and was normal. left radial site was cleaned with chloraprep and draped. Radial artery was identified, then Lidocaine 1% was infiltrated locally. Radial arterial line was inserted, a good blood flow was obtained, after which guidewire was inserted all the way with no resistance. Then the canula was inserted and needle with guidewire was withdrawn. Pulsatile bright red blood flow was observed. The canula was connected to BP monitoring apparatus and a good quality waveform was noted. Then the canula was secured with 2 stay sutures of 3-0 silk after Lidocaine infiltration, following which dressing was applied. Number of sticks: 4. Number of Kits used: 4. Complications: No immediate complication. Estimated blood loss: About 1 ml. Comment: Patient tolerated the procedure well.      Vicki Almodovar DO PGY-1  3/21/2021 6:01 AM     Nataly Patel MD PGY-3  3/21/2021 6:01 AM    Attending: Dr. Lazaro Villa

## 2021-03-21 NOTE — PROGRESS NOTES
Date: 3/21/2021    Time: 3:23 AM    Patient Placed On BIPAP/CPAP/ Non-Invasive Ventilation? No    If no must comment. Facial area red/color change? No           If YES are Blister/Lesion present? No   If yes must notify nursing staff  BIPAP/CPAP skin barrier?   Yes    Skin barrier type:mepilexlite       Comments:    Pt changed over from total face mask to a full face mask per pt request, Dr Domi Talamantes at [address] [place] [address] [place]

## 2021-03-21 NOTE — PROGRESS NOTES
Pharmacy Consultation Note  (Anticoagulant Dosing and Monitoring)    Initial consult date: 3/21/21  Consulting physician: Dr Bo Hickman    Allergies:  Patient has no known allergies. 66 y.o. male      Ht Readings from Last 1 Encounters:   03/19/21 6' 1\" (1.854 m)     Wt Readings from Last 1 Encounters:   03/20/21 233 lb (105.7 kg)         Warfarin Indication Target   INR [place] Dose  (if applicable) Diet/Feeding Tube   Afib 2 - 3 2 mg five times weekly  3 mg two times weekly NPO     Vitamin K or Blood product  Administration Date               Warfarin drug-drug interactions  Start  Stop Home Med? Comments    Allopurinol 100 mg PO BID 3/20  Yes    ASA 81 mg PO daily 3/19  Yes    Levothyroxine 125 mcg PO daily 3/19  Yes            Hepatic Function Panel:                            Lab Results   Component Value Date    ALKPHOS 58 03/20/2021    ALT 15 03/20/2021    AST 21 03/20/2021    PROT 6.4 03/20/2021    BILITOT 0.6 03/20/2021    LABALBU 3.1 03/20/2021       Date Warfarin Dose INR Heparin or LMWH HBG/HCT PLT Comment   3/20 2 mg (0018)  2 mg (1606) 2.6 -- 14.6/46.7 153    3/21 Hold dose 2.9 -- 14.3/45.1 136 Pharmacy consulted                                Assessment:  · 67 yo M admitted with SOB and acute decompensated HF  · Pt on chronic anticoagulation with Warfarin for Afib - home regimen is Warfarin 3 mg on Tu/Th and 2 mg the rest of the week   · Pharmacy consulted on 3/21 per Dr Bo Hickman to dose/manage Warfarin  · INR today is 2.9; INR goal 2 - 3    Plan:  · Due to INR of 2.9 and acute decompensated HF, will hold Warfarin tonight  · Daily PT/INR until the INR is stable within the therapeutic range. Will continue to follow. Thank you for the consult.     Kitty Shen, PharmD, BCPS, BCCCP  3/21/2021  10:07 AM  Pager: [phone]

## 2021-03-22 NOTE — PROGRESS NOTES
Exam:  General Appearance: alert, appears stated age and cooperative  Neck: no adenopathy, no carotid bruit, no JVD, supple, symmetrical, trachea midline and thyroid not enlarged, symmetric, no tenderness/mass/nodules  Lung: rhonchi bilaterally  Heart: regular rate and rhythm, S1, S2 normal, no murmur, click, rub or gallop  Abdomen: soft, non-tender; bowel sounds normal; no masses,  no organomegaly  Extremities:  extremities normal, atraumatic, no cyanosis or edema  Musculoskeletal: No joint swelling, no muscle tenderness. ROM normal in all joints of extremities.    Neurologic: Mental status: Alert, oriented, thought content appropriate    Lines     site day    Art line   L Radial    TLC None    PICC None    Hemoaccess None    Oxygen:    BIPAP and high flow nasal canula   Mechanical Ventilation:    ABG:     Lab Results   Component Value Date    PH 7.331 03/21/2021    PCO2 49.2 03/21/2021    PO2 68.7 03/21/2021    HCO3 25.4 03/21/2021    BE -1.2 03/21/2021    THB 16.1 03/21/2021    O2SAT 92.1 03/21/2021        Medications     Infusions: (Fluid, Sedation, Vasopressors)  IVF:     Vasopressors  Off pressers     Sedation  Off sedation     Nutrition:     General diet     ATB:   Antibiotics  Days   ceftraixone  2           Cultures:   negative     Consults:   Cardiology     Labs     CBC with Differential:    Lab Results   Component Value Date    WBC 12.1 03/21/2021    RBC 4.60 03/21/2021    HGB 14.3 03/21/2021    HCT 45.1 03/21/2021     03/21/2021    MCV 98.0 03/21/2021    MCH 31.1 03/21/2021    MCHC 31.7 03/21/2021    RDW 18.0 03/21/2021    NRBC 0.9 03/21/2021    LYMPHOPCT 0.9 03/21/2021    MONOPCT 5.2 03/21/2021    BASOPCT 0.3 03/21/2021    MONOSABS 0.60 03/21/2021    LYMPHSABS 0.12 03/21/2021    EOSABS 0.00 03/21/2021    BASOSABS 0.00 03/21/2021     CMP:    Lab Results   Component Value Date     03/22/2021    K 5.1 03/22/2021    K 5.7 03/20/2021     03/22/2021    CO2 22 03/22/2021    BUN 53 03/22/2021 CREATININE 2.3 03/22/2021    GFRAA 33 03/22/2021    LABGLOM 33 03/22/2021    GLUCOSE 83 03/22/2021    PROT 6.4 03/20/2021    LABALBU 3.1 03/20/2021    CALCIUM 9.5 03/22/2021    BILITOT 0.6 03/20/2021    ALKPHOS 58 03/20/2021    AST 21 03/20/2021    ALT 15 03/20/2021     Warfarin PT/INR:  No components found for: PTPATWAR, PTINRWAR  ABG:    Lab Results   Component Value Date    PH 7.331 03/21/2021    PCO2 49.2 03/21/2021    PO2 68.7 03/21/2021    HCO3 25.4 03/21/2021    BE -1.2 03/21/2021    O2SAT 92.1 03/21/2021       Imaging Studies:  CXR:   Chest x ray march 22   Heart is significantly enlarged.  Some prominence of the pulmonary arteries   suggest pulmonary hypertension. Ana Luisa Finders is worsening infiltrate in the right   lower lobe.  Evaluation left retrocardiac region is limited.  There are no   obvious effusions. ICT chest, 21 march 2020    No evidence of a pulmonary nodule in the area of abnormality noted on recent   chest radiograph.  The radiographic finding was reflective of the severely   central pulmonary arteries.  The main pulmonary artery is dilated up to 6 cm,   consistent with pulmonary artery hypertension.  Additionally, there is severe   cardiomegaly with a small pericardial effusion and small, partially loculated   bilateral pleural effusions (right greater than left).     Moderate centrilobular emphysema.       2-3 mm subpleural pulmonary nodule in the right lower lobe.  If the patient   is at high risk for development of lung carcinoma, a follow-up CT of the   chest could be considered in 1 year to document stability.       Mildly enlarged mediastinal lymph nodes, of uncertain etiology or clinical   significance.        Resident's Assessment and Plan     Dario Finney   , 66 y.o. , male came with CC : Hypoxia and hypotension     has a past medical history of Arrhythmia, Atrial fibrillation ([address] CHF (congestive heart failure) ([address] CKD (chronic kidney disease), Hyperlipidemia, Hypertension, Nonischemic cardiomyopathy ([facility] and Thyroid disease. SUMMARY OF HOSPITAL STAY: Briefly, Mr. Cal Goldberg with past medical history of nonischemic cardiomyopathy status post ICD in 2011, permanent atrial fibrillation on warfarin, heart failure with reduced ejection fraction EF 25% (2018): Chronic kidney disease stage IIIb, COPD on 3 L nasal cannula at home, benign prostatic hyperplasia, hypertension, hyperlipidemia, hypothyroidism, morbid obesity. Initially presented to the ED on 3/19/2021 chief complaint of dyspnea on exertion, and hypoxia. Initial assessment vital signs are stable, satting 99% on high flow nasal cannula. on examination he was fluid overload. proBNP were 12,394. Chest x-ray noted for pulmonary congestion. rest of the labs were unremarkable. He received 40 mg Lasix and aspirin and was admitted to the telemetry floor. On the floor he was unable to wean off high flow nasal cannula required BiPAP. He also had episodes of hypotension and V. Tach. For further care and management he was transferred to the medical ICU. (March 21)    Days since Admission: 4  Days on Ventilator :     Currently being managed for :    NEURO  Patient is alert and oriented x3    CARDIO  Acute decompensated heart failure on chronic heart failure with reduced ejection fraction. Patient has history of heart failure with reduced ejection fraction EF 25% presented with worsening shortness of breath for last 3 weeks. Chest x-ray noted for pulmonary congestion. proBNP is elevated 14,608.   Initially admitted to the floor later transferred to the medical ICU for worsening hypoxia and hypotension  Cardiology following, appreciate recommendations  Continue Bumex infusion  Monitor daily weight, input and output record  Echo notes for severe right sided Chambers dilations, PAH and EF 35%   Wean of presser   Hold home medication  Unlikely septic shock    History of atrial fibrillation, hyperlipidemia, hypertension, nonischemic cardiomyopathy status post ICD in 2011  Hold metoprolol in the setting of shock  Continue warfarin with INR goal of 2-2.5  Pharmacy to dose warfarin, appreciate input  Telemetry monitoring  Cardiology following  Continue atorvastatin 20 mg, aspirin 81 mg  Continue digoxin 62.5 mcg daily        PULM  Acute on chronic hypoxic respiratory failure secondary to ADHF versus cardiogenic shock  Patient has a history of COPD requiring 3 L of nasal cannula at home. He was found to have hypoxic and put on high flow nasal cannula at urologist office. Patient desatted on the floor and put on BiPAP. Tolerating BiPAP well. No signs of infection or COPD exacerbation at this point. Chest x-ray noted for bulge in the left suprahilar region suspicious for mass or adenopathy  Unlikely PE as patient is on warfarin. D-dimer less than 2000  Not a candidate for CTA due to CKD  May need V/Q scan  Rule out sarcoidosis  Continue to diurese with Bumex  Continue BiPAP wean as appropriate  Breathing treatment with Suha Macleod and Trace    NEPHRO(Fluids/ Electrolytes & Nutrition):   Acute kidney injury stage I prerenal on chronic kidney disease stage IIIb  Patient has baseline creatinine 1.7, FeUrea is 20.7 suggesting prerenal cause. Continue gentle diuresis  Keep input and output record       Gross hematuria due to malposition Cohen catheter  Urology consulted, appreciate recommendations  Monitor urine output     History of elevated PSA 27, follows with Dr. Layla Amaral  Continue finasteride 5 mg    HEME/ONC  Leukocytosis likely reactionary  White count jumped from 5-12. Inflammatory marker and procalcitonin negative  Negative panculture, Legionella, respiratory panel and strep pneumo antigen    Hyperkalemia  Patient potassium is consistently above 5.5.   Status post calcium gluconate and insulin  Repeat BMP  Repeat calcium gluconate      ENDOCRINE  History of hyperlipidemia and hypothyroidism  Continue pravastatin and Synthroid 125 mcg    Next of Kin/ POA:   Kitty Crouch  Sister  [phone]    Code Status:   Full code    PT/OT: Will consult once patient is stable  DVT ppx: Warfarin  GI ppx: Protonix  Disposition: Continue ICU care     Hermelinda Young MD, PGY-1  Internal Medicine   Attending physician: Dr. Summer Ledesma     I personally saw, examined and provided care for the patient. Radiographs, labs and medication list were reviewed by me independently. I spoke with bedside nursing, therapists and consultants. Critical care services and times documented are independent of procedures and multidisciplinary rounds with Residents. Additionally comprehensive, multidisciplinary rounds were conducted with the MICU team. The case was discussed in detail and plans for care were established. Review of Residents documentation was conducted and revisions were made as appropriate. I agree with the above documented exam, problem list and plan of care. Wean neosynephrine   Check cortisol   Blue Padgett M.D.    Pulmonary/Critical Care Medicine   35 min cct excluding procedures

## 2021-03-22 NOTE — CARE COORDINATION
3/22 Care Coordination: Marissa Lundy came to ED for SOB,He does have a past medical history of congestive heart failure, COPD and does wear oxygen at 4 L nasal cannula continuous. He does have a defibrillator in place. He does follow with Dr. Emily Orr on a regular basis. Admit to MICU, Acute CHF,On Bipap. IV Dario. Spoke with him in his room. States he gets his O2 from the [facility], goes to one on [facility]. Also gets his methadone from [facility]. They have also set up [facility] in the past for him. His regular PCP is Dr. Mohinder Barnes. He doesn't remember the [facility] doctors name. Plan is to return Home Lives alone but will have help from family. His Nephew is in room and did validate information. He does not want to go to [facility]. He states has an apt about once a year there. In fact he has an apt 3/31. CM/SW will continue to follow for discharge planning.    Stephany Warren BSN,RN--[phone]

## 2021-03-22 NOTE — PROGRESS NOTES
Pharmacy Consultation Note  (Anticoagulant Dosing and Monitoring)    Initial consult date: 3/21/21  Consulting physician: Dr Astrid Leal    Allergies:  Patient has no known allergies. 66 y.o. male      Ht Readings from Last 1 Encounters:   03/19/21 6' 1\" (1.854 m)     Wt Readings from Last 1 Encounters:   03/20/21 233 lb (105.7 kg)         Warfarin Indication Target   INR Range Home Dose  (if applicable) Diet/Feeding Tube   Afib 2 - 3 2 mg five times weekly  3 mg two times weekly NPO     Vitamin K or Blood product  Administration Date               Warfarin drug-drug interactions  Start  Stop Home Med? Comments    Allopurinol 100 mg PO BID 3/20  Yes    ASA 81 mg PO daily 3/19  Yes    Levothyroxine 125 mcg PO daily 3/19  Yes            Hepatic Function Panel:                            Lab Results   Component Value Date    ALKPHOS 58 03/20/2021    ALT 15 03/20/2021    AST 21 03/20/2021    PROT 6.4 03/20/2021    BILITOT 0.6 03/20/2021    LABALBU 3.1 03/20/2021       Date Warfarin Dose INR Heparin or LMWH HBG/HCT PLT Comment   3/20 2 mg (0018)  2 mg (1606) 2.6 -- 14.6/46.7 153    3/21 Hold dose 2.9 -- 14.3/45.1 136 Pharmacy consulted   3/22 Hold 4.4 -- -- --                        Assessment:  · 65 yo M admitted with SOB and acute decompensated HF  · Pt on chronic anticoagulation with Warfarin for Afib - home regimen is Warfarin 3 mg on Tu/Th and 2 mg the rest of the week   · Pharmacy consulted on 3/21 per Dr Astrid Leal to dose/manage Warfarin  · INR today is 4.4; INR goal 2 - 3    Plan:  · Hold warfarin tonight  · Daily PT/INR until the INR is stable within the therapeutic range. Will continue to follow. Thank you for the consult.   Teressa Wells, PharmD, BCPS 3/22/2021 3:36 PM

## 2021-03-22 NOTE — PLAN OF CARE
Problem: Falls - Risk of:  Goal: Will remain free from falls  Description: Will remain free from falls  3/22/2021 1114 by Solitario Snell RN  Outcome: Met This Shift  3/22/2021 0657 by Maximino Back RN  Outcome: Met This Shift  Goal: Absence of physical injury  Description: Absence of physical injury  3/22/2021 1114 by Solitario Snell RN  Outcome: Met This Shift  3/22/2021 0657 by Maximino Back RN  Outcome: Met This Shift     Problem: OXYGENATION/RESPIRATORY FUNCTION  Goal: Patient will maintain patent airway  3/22/2021 1114 by Solitario Snell RN  Outcome: Met This Shift  3/22/2021 0657 by Maximino Back RN  Outcome: Met This Shift  Goal: Patient will achieve/maintain normal respiratory rate/effort  Description: Respiratory rate and effort will be within normal limits for the patient  3/22/2021 1114 by Solitario Snell RN  Outcome: Met This Shift  3/22/2021 0657 by Maximino Back RN  Outcome: Met This Shift     Problem: ACTIVITY INTOLERANCE/IMPAIRED MOBILITY  Goal: Mobility/activity is maintained at optimum level for patient  3/22/2021 8445 by Maximino Back RN  Outcome: Met This Shift     Problem: Skin Integrity:  Goal: Will show no infection signs and symptoms  Description: Will show no infection signs and symptoms  3/22/2021 0657 by Maximino Back RN  Outcome: Met This Shift  Goal: Absence of new skin breakdown  Description: Absence of new skin breakdown  3/22/2021 0657 by Maximino Back RN  Outcome: Met This Shift

## 2021-03-22 NOTE — PROGRESS NOTES
Date: 3/22/2021    Time: 5:21 AM    Patient Placed On BIPAP/CPAP/ Non-Invasive Ventilation? No    If no must comment. Remains on  Facial area red/color change? No           If YES are Blister/Lesion present? No   If yes must notify nursing staff  BIPAP/CPAP skin barrier?   Yes    Skin barrier type:mepilexlite       Comments:        Austen Chino

## 2021-03-22 NOTE — PROGRESS NOTES
Date: 3/22/2021    Time: 1:04 AM    Patient Placed On BIPAP/CPAP/ Non-Invasive Ventilation? No    If no must comment. Remains on  Facial area red/color change? No           If YES are Blister/Lesion present? No   If yes must notify nursing staff  BIPAP/CPAP skin barrier?   Yes    Skin barrier type:mepilexlite       Comments:        Colby West

## 2021-03-22 NOTE — PROGRESS NOTES
Inpatient Cardiology Progress note     PATIENT IS BEING FOLLOWED FOR: CHF     Eva Wan  is a 66year old  male who follows with Dr. Berhane Moseley and Dr. Brennen Cerna. SUBJECTIVE: Denies chest pain and dyspnea. Remains on Bipap. OBJECTIVE: No apparent distress     ROS:  Consist: Denies fevers, chills or night sweats  Heart: Denies chest pain, palpitations, lightheadedness, dizziness or syncope  Lungs: Denies SOB, cough, wheezing, orthopnea or PND  GI: Denies abdominal pain, vomiting or diarrhea    PHYSICAL EXAM:   BP (!) 107/57   Pulse 111   Temp 97.8 °F (36.6 °C) (Temporal)   Resp 23   Ht 6' 1\" (1.854 m)   Wt 233 lb (105.7 kg)   SpO2 98%   BMI 30.74 kg/m²    CONST: Well developed, obese, elderly male who appears stated age. Awake, alert and cooperative. No apparent distress  HEENT:   Head- Normocephalic, atraumatic   Eyes- Conjunctivae pink, anicteric  Throat- Oral mucosa pink and moist  Neck-  No stridor, trachea midline, no jugular venous distention. No carotid bruit  CHEST: Chest symmetrical and non-tender to palpation. No accessory muscle use or intercostal retractions  RESPIRATORY:  Lung sounds - diminished throughout fields   CARDIOVASCULAR:     Heart Inspection- shows no noted pulsations  Heart Palpation- no heaves or thrills; PMI is non-displaced   Heart Ausculation- Irregular rate and rhythm, 2/6 systolic murmur. No s3, or rub   PV: 1+ pitting bilateral lower extremity edema. No varicosities. Pedal pulses palpable, no clubbing or cyanosis   ABDOMEN: Soft, non-tender to light palpation. Bowel sounds present. No palpable masses no organomegaly; no abdominal bruit  MS: Good muscle strength and tone. No atrophy or abnormal movements. : Cohen catheter with clear yellow urine. SKIN: Warm and dry no statis dermatitis or ulcers   NEURO / PSYCH: Oriented to person, place and time. Speech clear and appropriate. Follows all commands.  Pleasant affect       Intake/Output Summary (Last 24 hours) at 3/22/2021 1220  Last data filed at 3/22/2021 1218  Gross per 24 hour   Intake 606.51 ml   Output 1325 ml   Net -718.49 ml       Weight:   Wt Readings from Last 3 Encounters:   03/20/21 233 lb (105.7 kg)   02/18/21 214 lb (97.1 kg)   08/27/19 203 lb 9.6 oz (92.4 kg)     Current Inpatient Medications:   digoxin  62.5 mcg Intravenous Daily    cefTRIAXone (ROCEPHIN) IV  1,000 mg Intravenous Q24H    warfarin (COUMADIN) daily dosing (placeholder)   Other RX Placeholder    Arformoterol Tartrate  15 mcg Nebulization BID    allopurinol  100 mg Oral BID    aspirin  81 mg Oral Daily    ferrous sulfate  325 mg Oral Daily with breakfast    finasteride  5 mg Oral Daily    [Held by provider] isosorbide mononitrate  30 mg Oral Daily    levothyroxine  125 mcg Oral Daily    [Held by provider] metoprolol succinate  75 mg Oral Daily    pravastatin  20 mg Oral Daily    ipratropium-albuterol  1 ampule Inhalation Q4H WA    sodium chloride flush  10 mL Intravenous 2 times per day    [Held by provider] furosemide  40 mg Intravenous BID       IV Infusions (if any):   dextrose      phenylephrine (NEAL-SYNEPHRINE) 50mg/250mL infusion 15 mcg/min (03/22/21 0604)       DIAGNOSTIC/ LABORATORY DATA:  Labs:   CBC:   Recent Labs     03/20/21 2140 03/21/21 0454   WBC 5.3 12.1*   HGB 14.6 14.3   HCT 46.7 45.1    136     BMP:   Recent Labs     03/21/21 2247 03/22/21 0437    137   K 5.6* 4.7   CO2 22 23   BUN 52* 53*   CREATININE 2.4* 2.2*   LABGLOM 32 35   CALCIUM 9.5 9.5     Mag:   Recent Labs     03/21/21 2247 03/22/21 0437   MG 2.1 2.3     Phos:   Recent Labs     03/21/21 2247 03/22/21 0437   PHOS 4.5 4.7*   PT/INR:   Recent Labs     03/21/21  0630 03/22/21 0437   PROTIME 31.5* 48.9*   INR 2.9 4.4   CARDIAC ENZYMES:  Recent Labs     03/21/21 0454 03/21/21  1033   CKTOTAL 63  --    CKMB 4.2  --    TROPONINI 0.04* 0.04*     FASTING LIPID PANEL:  Lab Results   Component Value Date    CHOL 99 03/20/2021 4. Hypotension: Improved on Dario  5. Borderline elevated Troponin - Due to CKD, hypoxemia -->  Not ACS pattern as there is not a rise and fall pattern. 6. Known Hx of Nonischemic Cardiomyopathy  7. S/p ICD in 2011  8. Mild MR, Moderate to Severe TR, Moderate to Severe PHTN per echo this admission. 9. Permanent AF, on Digoxin   10. Chronic anticoagulation with Coumadin with INR now 4.4   11. CKD (reason not on ACE I/ARB) with baseline SCr 1.7  12. Hypothyroidism, on replacement therapy   13. Hematuria: Resolved; Urology on case   15. Leukocytosis/Afebrile          PLAN:  1. Monitor daily weight, I&O, BMP; Continue Bumex gtt  2. Monitor BP; Wean Dario as tolerated. 3. Continue Digoxin, monitor Digoxin level   4. Hold Coumadin and monitor INR;  INR goal  2-3  5.  Case discussed with Dr. Marcel Cuevas     Electronically signed by RIVERA Bills CNP on 3/22/2021 at 12:20 PM

## 2021-03-22 NOTE — PROGRESS NOTES
[facility]                                                            PROGRESS NOTE                                                                       3/22/2021          SUBJECTIVE:   Roman now draining well  Urine clear  Pt comfortale with roman     OBJECTIVE:    /70   Pulse 114   Temp 97.3 °F (36.3 °C) (Temporal)   Resp 26   Ht 6' 1\" (1.854 m)   Wt 233 lb (105.7 kg)   SpO2 90%   BMI 30.74 kg/m²     PHYSICAL EXAMINATION:  Skin: dry, without rashes  Respirations: non-labored, intact  Abdomen: soft, non-tender, non-distended  Roman in good position      Lab Results   Component Value Date    WBC 12.1 (H) 03/21/2021    HGB 14.3 03/21/2021    HCT 45.1 03/21/2021    MCV 98.0 03/21/2021     03/21/2021       Lab Results   Component Value Date    CREATININE 2.2 (H) 03/22/2021       Lab Results   Component Value Date    PSA 7.05 (H) 03/21/2021    PSA 27.01 (H) 12/02/2020    PSA 18.92 (H) 06/09/2020       REVIEW OF SYSTEMS:    CONSTITUTIONAL: negative  HEENT: negative  HEMATOLOGIC: negative  ENDOCRINE: negative  RESPIRATORY: negative  CV: negative  GI: negative  NEURO: negative  ORTHOPEDICS: negative  PSYCHIATRIC: negative  : as above    PAST FAMILY HISTORY:    Family History   Problem Relation Age of Onset    Cancer Father     Cancer Sister      PAST SOCIAL HISTORY:    Social History     Socioeconomic History    Marital status: Single     Spouse name: Not on file    Number of children: Not on file    Years of education: Not on file    Highest education level: Not on file   Occupational History    Not on file   Social Needs    Financial resource strain: Not on file    Food insecurity     Worry: Not on file     Inability: Not on file    Transportation needs     Medical: Not on file     Non-medical: Not on file   Tobacco Use    Smoking status: Former Smoker     Packs/day: 2.00     Years: 40.00     Pack years: 80.00     Types: **OR** acetaminophen, perflutren lipid microspheres    ASSESSMENT:    Patient Active Problem List   Diagnosis    Nonischemic cardiomyopathy ([facility] [address]    Arrhythmia    Essential hypertension    Hyperlipidemia    Hypothyroid    S/P ICD (internal cardiac defibrillator) procedure    Chronic anticoagulation    Permanent atrial fibrillation (HCC)    Chronic systolic heart failure (HCC)    Stage 3 chronic kidney disease    Chronic obstructive pulmonary disease (HCC)    Acute on chronic systolic congestive heart failure (HCC)    Acute on chronic respiratory failure ([facility] [address]       PLAN:  Continue with roman until ambulating         Audie Duffy M.D.  3/22/2021  7:28 AM

## 2021-03-22 NOTE — PROGRESS NOTES
Date: 3/21/2021    Time: 8:43 PM    Patient Placed On BIPAP/CPAP/ Non-Invasive Ventilation? Yes    If no must comment. Facial area red/color change? No           If YES are Blister/Lesion present? No   If yes must notify nursing staff  BIPAP/CPAP skin barrier?   Yes    Skin barrier type:mepilexlite       Comments:        Marion Hawley

## 2021-03-22 NOTE — PROGRESS NOTES
Chief Complaint:  Chief Complaint   Patient presents with    Shortness of Breath     usually wears 3-4L. Arrived on NRB @ 99% SPo2     Acute on chronic systolic congestive heart failure (HCC)     Subjective:    Still with severe dyspnea. Still on BiPAP saturating high 80's. Objective:    BP (!) 107/57   Pulse 114   Temp 97.8 °F (36.6 °C) (Temporal)   Resp (!) 45   Ht 6' 1\" (1.854 m)   Wt 233 lb (105.7 kg)   SpO2 (!) 87%   BMI 30.74 kg/m²     Current medications that patient is taking have been reviewed.     General appearance: Chronically ill, fatigued appearing  HEENT: AT/NC, MMM  Neck: FROM, supple  Lungs: Clear to auscultation, somewhat distant lung sounds, WOB normal  CV: RRR, no MRGs  Abdomen: Soft, non-tender; no masses or HSM, +BS  Extremities: No peripheral edema or digital cyanosis  Skin: no rash, lesions or ulcers  Psych: Calm and cooperative  Neuro: Alert and interactive, face symmetric, moving all extremities, speech fluent    Labs:  CBC with Differential:    Lab Results   Component Value Date    WBC 12.1 03/21/2021    RBC 4.60 03/21/2021    HGB 14.3 03/21/2021    HCT 45.1 03/21/2021     03/21/2021    MCV 98.0 03/21/2021    MCH 31.1 03/21/2021    MCHC 31.7 03/21/2021    RDW 18.0 03/21/2021    NRBC 0.9 03/21/2021    LYMPHOPCT 0.9 03/21/2021    MONOPCT 5.2 03/21/2021    BASOPCT 0.3 03/21/2021    MONOSABS 0.60 03/21/2021    LYMPHSABS 0.12 03/21/2021    EOSABS 0.00 03/21/2021    BASOSABS 0.00 03/21/2021     CMP:    Lab Results   Component Value Date     03/22/2021    K 5.1 03/22/2021    K 5.7 03/20/2021     03/22/2021    CO2 22 03/22/2021    BUN 53 03/22/2021    CREATININE 2.3 03/22/2021    GFRAA 33 03/22/2021    LABGLOM 33 03/22/2021    GLUCOSE 83 03/22/2021    PROT 6.4 03/20/2021    LABALBU 3.1 03/20/2021    CALCIUM 9.5 03/22/2021    BILITOT 0.6 03/20/2021    ALKPHOS 58 03/20/2021    AST 21 03/20/2021    ALT 15 03/20/2021        Imaging:  I've personally reviewed the patient's CT chest:   Relatively clear lung fields    Small R and tiny L pleural effusion    Emphysematous changes    Impressively dilated pulmonary artery    Assessment/Plan:  Principal Problem:    Acute on chronic systolic congestive heart failure (HCC)  Active Problems:    Nonischemic cardiomyopathy (HCC)    S/P ICD (internal cardiac defibrillator) procedure    Essential hypertension    Chronic anticoagulation    Permanent atrial fibrillation (HCC)    Stage 3 chronic kidney disease    Chronic obstructive pulmonary disease (HCC)    Acute on chronic respiratory failure (HCC)  Resolved Problems:    * No resolved hospital problems. *       Multifactorial, very severe respiratory failure    Pulmonary HTN, R sided CHF, mild L sided failure, COPD all contributing. Prognosis guarded. Continue IV diuretics    Consider RHC or more advanced diagnostics - his severity of respiratory failure seems disproportional to his lung findings.     CKD stable    Requires continued inpatient level of care     Osbaldo Matos    4:00 PM  3/22/2021

## 2021-03-23 PROBLEM — R79.1 SUPRATHERAPEUTIC INR: Status: ACTIVE | Noted: 2021-01-01

## 2021-03-23 NOTE — PROGRESS NOTES
Date: 3/23/2021    Time: 1:11 AM    Patient Placed On BIPAP/CPAP/ Non-Invasive Ventilation? Patient continues on bipap    If no must comment. Facial area red/color change? No           If YES are Blister/Lesion present? No   If yes must notify nursing staff  BIPAP/CPAP skin barrier?   Yes    Skin barrier type:mepilexlite       Comments:        Genie Palacios, RRT

## 2021-03-23 NOTE — PROGRESS NOTES
Pharmacy Consultation Note  (Anticoagulant Dosing and Monitoring)    Initial consult date: 3/21/21  Consulting physician: Dr Markel Curtis    Allergies:  Patient has no known allergies. 66 y.o. male      Ht Readings from Last 1 Encounters:   03/19/21 6' 1\" (1.854 m)     Wt Readings from Last 1 Encounters:   03/20/21 233 lb (105.7 kg)         Warfarin Indication Target   INR Range Home Dose  (if applicable) Diet/Feeding Tube   Afib 2 - 3 2 mg five times weekly  3 mg two times weekly NPO     Vitamin K or Blood product  Administration Date     Phytonadione 5 mg x1 3/23         Warfarin drug-drug interactions  Start  Stop Home Med? Comments    Allopurinol 100 mg PO BID 3/20  Yes    ASA 81 mg PO daily 3/19  Yes    Levothyroxine 125 mcg PO daily 3/19  Yes            Hepatic Function Panel:                            Lab Results   Component Value Date    ALKPHOS 58 03/20/2021    ALT 15 03/20/2021    AST 21 03/20/2021    PROT 6.4 03/20/2021    BILITOT 0.6 03/20/2021    LABALBU 3.1 03/20/2021       Date Warfarin Dose INR Heparin or LMWH HBG/HCT PLT Comment   3/20 2 mg (0018)  2 mg (1606) 2.6 -- 14.6/46.7 153    3/21 Hold dose 2.9 -- 14.3/45.1 136 Pharmacy consulted   3/22 Hold 4.4 -- -- --    3/23 Hold 7.1 -- 14.5/45 133 Phytonadione 5 mg x1 for procedure tomorrow              Assessment:  · 65 yo M admitted with SOB and acute decompensated HF  · Pt on chronic anticoagulation with Warfarin for Afib - home regimen is Warfarin 3 mg on Tu/Th and 2 mg the rest of the week   · Pharmacy consulted on 3/21 per Dr Markel Curtis to dose/manage Warfarin  · INR goal 2 - 3    Plan:  · Hold warfarin tonight  · Daily PT/INR until the INR is stable within the therapeutic range. Will continue to follow. Thank you for the consult.   Lauren Muller, PharmD, BCPS 3/23/2021 2:19 PM

## 2021-03-23 NOTE — PROGRESS NOTES
Chief Complaint:  Chief Complaint   Patient presents with    Shortness of Breath     usually wears 3-4L. Arrived on NRB @ 99% SPo2     Acute on chronic systolic congestive heart failure (HCC)     Subjective:    Dyspnea slightly better today, but still on BiPAP    Objective:    /81   Pulse 112   Temp 98 °F (36.7 °C) (Temporal)   Resp (!) 34   Ht 6' 1\" (1.854 m)   Wt 233 lb (105.7 kg)   SpO2 91%   BMI 30.74 kg/m²     Current medications that patient is taking have been reviewed.     General appearance: Chronically ill, fatigued appearing  HEENT: AT/NC, MMM  Neck: FROM, supple  Lungs: Clear to auscultation, somewhat distant lung sounds, WOB normal  CV: RRR, no MRGs  Abdomen: Soft, non-tender; no masses or HSM, +BS  Extremities: No peripheral edema or digital cyanosis  Skin: no rash, lesions or ulcers  Psych: Calm and cooperative  Neuro: Alert and interactive, face symmetric, moving all extremities, speech fluent    Labs:  CBC with Differential:    Lab Results   Component Value Date    WBC 11.1 03/23/2021    RBC 4.65 03/23/2021    HGB 14.5 03/23/2021    HCT 45.0 03/23/2021     03/23/2021    MCV 96.1 03/23/2021    MCH 30.3 03/23/2021    MCHC 31.5 03/23/2021    RDW 18.3 03/23/2021    NRBC 0.9 03/23/2021    LYMPHOPCT 2.6 03/23/2021    MONOPCT 11.3 03/23/2021    BASOPCT 0.2 03/23/2021    MONOSABS 1.22 03/23/2021    LYMPHSABS 0.33 03/23/2021    EOSABS 0.10 03/23/2021    BASOSABS 0.00 03/23/2021     CMP:    Lab Results   Component Value Date     03/23/2021    K 5.1 03/23/2021    K 5.7 03/20/2021     03/23/2021    CO2 24 03/23/2021    BUN 57 03/23/2021    CREATININE 2.1 03/23/2021    GFRAA 37 03/23/2021    LABGLOM 37 03/23/2021    GLUCOSE 102 03/23/2021    PROT 6.4 03/20/2021    LABALBU 3.1 03/20/2021    CALCIUM 9.6 03/23/2021    BILITOT 0.6 03/20/2021    ALKPHOS 58 03/20/2021    AST 21 03/20/2021    ALT 15 03/20/2021          Assessment/Plan:  Principal Problem:    Acute on chronic systolic congestive heart failure (HCC)  Active Problems:    Nonischemic cardiomyopathy (HCC)    S/P ICD (internal cardiac defibrillator) procedure    Essential hypertension    Chronic anticoagulation    Permanent atrial fibrillation (HCC)    Stage 3 chronic kidney disease    Chronic obstructive pulmonary disease (HCC)    Acute on chronic respiratory failure (HCC)    Supratherapeutic INR  Resolved Problems:    * No resolved hospital problems. *       Multifactorial, very severe respiratory failure    Pulmonary HTN, R sided CHF, mild L sided failure, COPD all contributing. Prognosis guarded. Continue IV diuretics    Consider RHC or more advanced diagnostics - his severity of respiratory failure seems disproportional to his lung findings.     CKD stable    No active bleeding, can allow INR to drift down; check LFT's    Requires continued inpatient level of care     Little Coyne    5:07 PM  3/23/2021

## 2021-03-23 NOTE — PROGRESS NOTES
[address] [facility]  Department of Internal Medicine   Internal Medicine Residency   MICU Progress Note    Patient:  Chuck Layton 66 y.o. male  MRN: [id number]     Date of Service: 3/23/2021    Allergy: Patient has no known allergies. Subjective     Overnight Events:    Pt in Afib RVR ovn, also having 17 beats NSVT - s/p Lopressor 2.5mg x3 and Digoxin 250mcg x2. Pt seen and examined at bedside. Appears in NAD. Endorses intermittent chest palpitations. Denies lightheadedness, chest pain, dyspnea, abdominal pain, nausea, or vomiting. No other acute complaints or concerns at this time. Case discussed with ICU nursing and attending physician. Objective     VS: /81   Pulse 112   Temp 98 °F (36.7 °C) (Temporal)   Resp (!) 34   Ht 6' 1\" (1.854 m)   Wt 233 lb (105.7 kg)   SpO2 91%   BMI 30.74 kg/m²   ABP (Arterial line BP): (!) 75/51  ABP mean (Arterial line mean): (!) 63 mmHg    I & O - 24hr:     Intake/Output Summary (Last 24 hours) at 3/23/2021 1656  Last data filed at 3/23/2021 1600  Gross per 24 hour   Intake 180 ml   Output 1340 ml   Net -1160 ml     Physical Exam:    Constitutional: Alert, conversational, follows commands. In no apparent distress. Head: Normocephalic and atraumatic. Eyes: Conjunctiva normal, (-) scleral icterus. Mucus membranes moist.  Mouth: Mucus membranes moist. Oropharynx clear. No deviation of the tongue or uvula. Neck: (-) swelling, trachea midline, neck supple. Respiratory: BiPAP in place, lung sounds diminished bilaterally, L>R. (-) wheezes, (-)  rales, (-)  Rhonchi. Visible tachypnea and mild recruitment of accessory muscles of respiration. Cardiovascular: Irregularly irregular rhythm, tachycardic rate. (-)  murmurs, (-) gallops,  (-) rubs. S1 and S2 were normal.   GI:  Abdomen soft, non-tender, non-distended. (+) BS. (-) guarding, (-) rigidity. Extremities: Warm and well perfused. (-) clubbing, (-) cyanosis. (-) peripheral edema.  Moving all extremities spontaneously. Neurologic:  No focal neurological deficits. No speech slurring, facial droop, or tremor.     Lines     site date    Art line   L Radial 3/22/2021   TLC None    PICC None    Hemoaccess None      ABG:     Lab Results   Component Value Date    PH 7.347 03/22/2021    PCO2 46.0 03/22/2021    PO2 74.1 03/22/2021    HCO3 24.7 03/22/2021    BE -1.3 03/22/2021    THB 14.9 03/22/2021    O2SAT 93.4 03/22/2021        Medications     Current Facility-Administered Medications   Medication Dose Route Frequency Provider Last Rate Last Admin    bumetanide (BUMEX) injection 1 mg  1 mg Intravenous BID Neha Valerio MD   1 mg at 03/23/21 1600    metoprolol succinate (TOPROL XL) extended release tablet 25 mg  25 mg Oral Daily Neha Valerio MD   25 mg at 03/23/21 1454    digoxin (LANOXIN) injection 62.5 mcg  62.5 mcg Intravenous Daily Bubba Dejesus MD   62.5 mcg at 03/23/21 0850    pantoprazole (PROTONIX) tablet 40 mg  40 mg Oral QAM AC Eddie Hays MD   40 mg at 03/23/21 0544    budesonide (PULMICORT) nebulizer suspension 500 mcg  0.5 mg Nebulization BID Neha Valerio MD   500 mcg at 03/23/21 0930    glucose (GLUTOSE) 40 % oral gel 15 g  15 g Oral PRN Tonio Velasco, DO        dextrose 50 % IV solution  12.5 g Intravenous PRN Tonio Velasco, DO   12.5 g at 03/23/21 0909    glucagon (rDNA) injection 1 mg  1 mg Intramuscular PRN Tonio Velasco, DO        dextrose 5 % solution  100 mL/hr Intravenous PRN Tonio Velasco, DO        cefTRIAXone (ROCEPHIN) 1,000 mg in sterile water 10 mL IV syringe  1,000 mg Intravenous Q24H Dwight Come., DO   1,000 mg at 03/23/21 0941    warfarin (COUMADIN) daily dosing (placeholder)   Other RX Placeholder Alejaja Velasco, DO        Arformoterol Tartrate (BROVANA) nebulizer solution 15 mcg  15 mcg Nebulization BID Dwight Come., DO   15 mcg at 03/23/21 0930    allopurinol (ZYLOPRIM) tablet 100 mg  100 mg Oral BID Ishmael Heredia MD   100 mg at 03/23/21 0850    aspirin chewable tablet 81 mg  81 mg Oral Daily Veverly MD Rebecca   81 mg at 03/22/21 1157    ferrous sulfate (IRON 325) tablet 325 mg  325 mg Oral Daily with breakfast Veverly MD Rebecca   325 mg at 03/23/21 0850    finasteride (PROSCAR) tablet 5 mg  5 mg Oral Daily Veverly MD Rebecca   5 mg at 03/23/21 0850    [Held by provider] isosorbide mononitrate (IMDUR) extended release tablet 30 mg  30 mg Oral Daily Mary Yoon MD   Stopped at 03/22/21 1200    levothyroxine (SYNTHROID) tablet 125 mcg  125 mcg Oral Daily Veverly MD Rebecca   125 mcg at 03/23/21 0850    pravastatin (PRAVACHOL) tablet 20 mg  20 mg Oral Daily Veverly MD Rebecca   20 mg at 03/23/21 1103    ipratropium-albuterol (DUONEB) nebulizer solution 1 ampule  1 ampule Inhalation Q4H WA Velauryn Fernandez MD   1 ampule at 03/23/21 1653    sodium chloride flush 0.9 % injection 10 mL  10 mL Intravenous 2 times per day Edi Fernandez MD   10 mL at 03/23/21 0851    sodium chloride flush 0.9 % injection 10 mL  10 mL Intravenous PRN Edi Fernandez MD   10 mL at 03/23/21 1600    magnesium hydroxide (MILK OF MAGNESIA) 400 MG/5ML suspension 30 mL  30 mL Oral Daily PRN Edi Fernandez MD        acetaminophen (TYLENOL) tablet 650 mg  650 mg Oral Q6H PRN Edi Fernandez MD        Or   [employer] acetaminophen (TYLENOL) suppository 650 mg  650 mg Rectal Q6H PRN Velauryn Fernandez MD        perflutren lipid microspheres (DEFINITY) injection 1.65 mg  1.5 mL Intravenous ONCE PRN Flakita Lopez MD         Labs     CBC with Differential:    Lab Results   Component Value Date    WBC 11.1 03/23/2021    RBC 4.65 03/23/2021    HGB 14.5 03/23/2021    HCT 45.0 03/23/2021     03/23/2021    MCV 96.1 03/23/2021    MCH 30.3 03/23/2021    MCHC 31.5 03/23/2021    RDW 18.3 03/23/2021    NRBC 0.9 03/23/2021    LYMPHOPCT 2.6 03/23/2021    MONOPCT 11.3 03/23/2021    BASOPCT 0.2 03/23/2021    MONOSABS 1.22 03/23/2021 LYMPHSABS 0.33 03/23/2021    EOSABS 0.10 03/23/2021    BASOSABS 0.00 03/23/2021     BMP:    Lab Results   Component Value Date     03/23/2021    K 5.1 03/23/2021    K 5.7 03/20/2021     03/23/2021    CO2 24 03/23/2021    BUN 57 03/23/2021    LABALBU 2.9 03/23/2021    CREATININE 2.1 03/23/2021    CALCIUM 9.6 03/23/2021    GFRAA 37 03/23/2021    LABGLOM 37 03/23/2021    GLUCOSE 102 03/23/2021     Hepatic Function Panel:    Lab Results   Component Value Date    ALKPHOS 52 03/23/2021    ALT 25 03/23/2021    AST 25 03/23/2021    PROT 5.9 03/23/2021    BILITOT 0.9 03/23/2021    BILIDIR 0.5 03/23/2021    IBILI 0.4 03/23/2021    LABALBU 2.9 03/23/2021     Magnesium:    Lab Results   Component Value Date    MG 2.3 03/23/2021     Phosphorus:    Lab Results   Component Value Date    PHOS 3.9 03/23/2021     PT/INR:    Lab Results   Component Value Date    PROTIME 79.5 03/23/2021    INR 7.1 03/23/2021     Pro-BNP  Pro-BNP 33,120High   0 - 450 pg/mL Final 03/23/2021  4:34 AM  [facility]     Imaging Studies:  CXR 3/23/2021    Impression   1. Stable pneumonia or interstitial pulmonary edema in perihilar locations in   lung bases. 2. Stable cardiomegaly. Resident's Assessment and Plan     Cardiology: 1. Acute decompensation of chronic HFrEF in setting of known ischemic cardiomyopathy   Echo showing EF 35% w/ global hypokinesis and severe RA and RV dilation, moderately dilated LA, moderate-severe TR, moderate-severe pulmonary HTN, and moderate pericardial effusion w/o signs of tamponade   Pro-BNP increased significantly from 3/21/2021  Pt successfully weaned off of vasopressors   Continue Bumex 1mg IV BID and f/u U/O and renal function   Daily CXR   Cardiology signing off    2. Afib RVR w/ NSVT   EP consulted, plan for ICD replacement and cardiac ablation 3/24/2021 if INR wnl   Pt on long-term Coumadin for chronic Afib - INR 7.1 this AM. Coumadin held and 5mg Vit K given.  Will repeat INR in AM  Continue to monitor ovn   Increase Digoxin to 125mcg daily   Continue Atorvastatin and ASA daily   BMP, Mg, and Phos q8hrs, will replete electrolytes as necessary  Appreciate further EP recs     Pulmonary:     1. Acute on chronic hypoxic respiratory failure likely 2/2 exacerbation of HFrEF +/- cardiogenic shock vs less likely PE   Pt is on anticoagulation chronically, less likely PE - may pursue V/Q scan when pt stabilized   Not candidate for CTA d/t CKD   Continue diuresis w/ Bumex 1mg IV BID   Continue BiPAP   F/u repeat ABGs   Daily CXR   Continue breathing treatments     Nephrology (Fluids/ Electrolytes & Nutrition):     1. Nonoliguric ANKUR stage I on CKD stage IIIb - likely prerenal in setting of hypotension +/- cardiogenic shock initially requiring pressors   FeUrea 20.7  Baseline Cr 1.7 - now stable at 2.1   Continue diuresis - monitor renal function and U/O   Avoid nephrotoxic medications     2. Hyperkalemia - resolved     :     1. Gross hematuria 2/2 malpositioned Cohen catheter - resolved     Endocrine:     1. PMHx hypothyroidism   Continue Synthroid 125mcg     DVT ppx: PCDs   GI ppx: Protonix     Megan Menchaca DO, PGY-1    Attending physician: Dr. Glenda Lazo     I personally saw, examined and provided care for the patient. Radiographs, labs and medication list were reviewed by me independently. I spoke with bedside nursing, therapists and consultants. Critical care services and times documented are independent of procedures and multidisciplinary rounds with Residents. Additionally comprehensive, multidisciplinary rounds were conducted with the MICU team. The case was discussed in detail and plans for care were established. Review of Residents documentation was conducted and revisions were made as appropriate. I agree with the above documented exam, problem list and plan of care. For ablation in am   Increase dig  Restart bb   Ailyn James M.D.    Pulmonary/Critical Care Medicine   36 min cct excluding procedures

## 2021-03-23 NOTE — CONSULTS
CARDIAC ELECTROPHYSIOLOGY CONSULTATION    PATIENT: [address] [facility] RECORD NUMBER: [id number]  DATE OF SERVICE:  3/23/2021  CONSULTING ELECTROPHYSIOLOGIST: Kamla Banks  PHYSICIAN REQUESTING CONSULTATION: Osbaldo Matos  REASON FOR CONSULTATION: Atrial fibrillation     PRESENTATION  3/19 via ED c/o sudden onset dyspnea. SYNOPSIS OF RELEVANT MEDICAL ISSUES  1. Poor metabolic health incurring multiple co-morbid conditions. 2. Advanced atherosclerosis, thus far subclinical.  3. Parenchymal pulmonary disease (includes emphysema): advanced. Current respiratory failure requiring BIPAP support. 4. Heart failure with reduced ejection fraction: etiology non-ischemic. Presently decompensated. 5. Atrial fibrillation: persistent. Long-standing. Rapid ventricular response, resistant to attempts at medicinal control. Prior amiodarone discontinued, possibly due to pulmonary toxicity. Likely contributing significantly to current decompensated condition. 6. Ventricular tachycardia: non-sustained. 7. Normal intraventricular conduction. 8. Implanted cardioverter-defibrillator system (ICD): normal interim function (see below). Pulse generator is at end of life. 9. Echocardiogram 3/19/21:  a. Left ventricular ejection fraction moderately diminished  b. Right ventricle severely dilated, and with severely reduced ejection fraction  c. Severe tricuspid regurgitation  d. Severe pulmonary arterial hypertension  e. Hemodynamically insignificant pericardial effusion  10. CHADS-VASC > 2: compliant with and tolerant of warfarin and \"low dose\" aspirin. INR 7.1                 THOUGHTS  1. Despite low yield given circumstances (chronic anticoagulation, low DD), an evaluation for recent pulmonary embolism may be prudent. 2.   There is no good option here for restoring sinus rhythm. 3.    Ventricular rate and regularity control would likely be helpful.  Given that this is not achievable with tolerated medicines, would recommend AV node ablation. 4.    Given echocardiogram (in particular right heart) and normal intraventricular conduction, I am concerned about the repercussions of rendering him ventricular pacing-dependent. Nonetheless, I think it is warranted given the circumstances. 5.     In that his ICD pulse generator requires replacement now, will revise his system to a rsynchronization system in tandem. RECOMMENDATIONS              1.    Consider evaluation for pulmonary arterial thrombosis. 2.    Perform catheter ablation of AV node and revision of ICD system to resynchronization system. Will schedule for tomorrow if INR is rendered appropriate. Ngozi Zamora MD, [facility]  Cardiac Electrophysiology  [phone]    3/23/21   10:56 AM EDT    I spent 90 minutes with this patient, >50% of which was dedicated to counseling/coordination of care pertaining to the above. CARDIAC IMPLANTABLE ELECTRICAL SYSTEM INTERROGATION    Pulse generator : [employer] (Intern[employer])    Pulse generator type: E110    Pulse generator battery: At end of life    Programmed bradycardia pacing mode: DDDR 70/120     Presenting rhythm: Atrial fibrillation with rapid ventricular response.     Underlying rhythm: Same     Interim pacing (%):   - Atrium: 0  - Ventricle: 0    Lead function:   - Atrium: wnl   - Ventricle: wnl    Interim tachycardia events/burden:   - Atrium: 100% fibrillation    - Ventricle: several detections +/- ATP, all for AF-induced RVR    Programming changes:  DDDR to VVI 70  Two tachycardia zones to single zone (220/min)

## 2021-03-23 NOTE — PROGRESS NOTES
Inpatient Cardiology Progress Note     We are following Mr. Corinne Ennis for HFrEF, nonischemic cardiomyopathy, and atrial fibrillation     Herman Akers is a 66 y.o. male who is known to Dr. Magdaleno Rowley service. He was seen in consult by Dr. Sade Krishna on 2021. SUBJECTIVE: He denies chest discomfort or palpitations and reports some improvement in his breathing. OBJECTIVE: Sick looking but in no acute distress. ROS:  Consist: Denies fevers, chills or night sweats  Heart: Denies chest pain, palpitations, lightheadedness, dizziness or syncope  Lungs: Denies SOB, cough, wheezing, orthopnea or PND  GI: Denies abdominal pain, vomiting or diarrhea    PHYSICAL EXAM:   /85   Pulse 119   Temp 98.1 °F (36.7 °C) (Temporal)   Resp (!) 33   Ht 6' 1\" (1.854 m)   Wt 233 lb (105.7 kg)   SpO2 (!) 87%   BMI 30.74 kg/m²    B/P Range last 24 hours: Systolic (16LZZ), GUR:509 , Min:92 , HO    Diastolic (31MRF), NQD:48, Min:55, Max:85    CONST: Well developed, well nourished elderly gentleman who appears of stated age. Awake, alert, and cooperative. Sick looking but in no acute distress. HEENT:   Head- Normocephalic, atraumatic   Throat- Oral mucosa pink and moist  Neck-  No stridor or carotid bruit. + JVD   CHEST: Chest symmetrical and non-tender to palpation. + accessory muscle use   RESPIRATORY:  Bilateral rhonchi   CARDIOVASCULAR:     Heart Ausculation-  Irregular and tachycardic, no murmur, s3,  or rub   PV: 1+ bilateral lower extremity edema. No varicosities. Pedal pulses palpable  ABDOMEN: Soft, non-tender to light palpation. Bowel sounds present. No palpable masses  MS: Good muscle strength and tone. No atrophy or abnormal movements. : Cohen catheter draining dark yellow urine   SKIN: Warm and dry   NEURO / PSYCH: Oriented to person, place and time. Speech clear and appropriate. Follows all commands.  Pleasant affect     Telemetry: AF with ventricular rates 110s, 16 beats results found for: EAG    BNP: No results for input(s): BNP in the last 72 hours. PT/INR:   Recent Labs     03/22/21  0437 03/23/21  0434   PROTIME 48.9* 79.5*   INR 4.4 7.1*     APTT:No results for input(s): APTT in the last 72 hours. CARDIAC ENZYMES:  Recent Labs     03/21/21  0454 03/21/21  1033   CKTOTAL 63  --    CKMB 4.2  --    TROPONINI 0.04* 0.04*     FASTING LIPID PANEL:  Lab Results   Component Value Date    CHOL 99 03/20/2021    HDL 39 03/20/2021    LDLCALC 50 03/20/2021    TRIG 48 03/20/2021     LIVER PROFILE:  Recent Labs     03/20/21  2140   AST 21   ALT 15   LABALBU 3.1*       CXR 2/23/201:   FINDINGS:   Heart is significantly enlarged.  Some prominence of the pulmonary arteries suggest pulmonary hypertension.  There is worsening infiltrate in the right lower lobe.  Evaluation left retrocardiac region is limited.  There are no obvious effusions. Echo 3/22/2021 (Dr. Estephania New):   Technically limited study. Moderately dilated left atrium. Severely dilated right-sided chambers with severe systolic dysfunction of the right ventricle. AICD/pacer wires noted inside the right-sided chambers. Mild mitral regurgitation. Moderate-severe tricuspid regurgitation. Moderate-severe pulmonary hypertension. EF 35+/-5%. The mildly dilated right ventricle noted on a study done on 1/18/2018 is  now severely dilated. ASSESSMENT:   1. Acute on chronic biventricular failure with evidence for severe pulmonary hypertension and severe tricuspid regurgitation   · Not on ACE-I/ARB/ARNI due to CKD  · Toprol XL 25 mg daily   2. Acute on chronic respiratory failure   3. Nonischemic cardiomyopathy  · S/p ICD implant in 2011  4. Longstanding persistent atrial fibrillation  · Chronic anticoagulation with Coumadin: today's INR 7.1  · Amiodarone stopped, possibly due to pulmonary toxicity  5. NSVT   6. CKD stage 3 with ANKUR  7. History of hypertension with episodes of hypotension since admission   8. Hyperkalemia    PLAN:  1. EP input appreciated: AV node ablation and upgrade to CRT-D tomorrow pending INR  2. Continue IV Bumex : consider changing to oral after procedure   3. Unable to add spironolactone due to hyperkalemia  4. Resume Imdur as blood pressure allows  5. Titrate Toprol XL to control VR. 6. If stable after adding above, will trial low dose hydralazine  7. CHF clinic referral as outpatient. 8. Will sign-off. May call if needed. Follow-up with Dr Charise Aase after hospital discharge.        Electronically signed by RIVERA Murdock on 3/23/2021 at 3:24 PM

## 2021-03-24 NOTE — PROGRESS NOTES
03/23/2021   CARDIAC ELECTROPHYSIOLOGY CONSULTATION    PATIENT: [facility] RECORD NUMBER: [id number]  DATE OF SERVICE:  3/24/2021  CONSULTING ELECTROPHYSIOLOGIST: Janes Betts  PHYSICIAN REQUESTING CONSULTATION: Osbaldo Matos  REASON FOR CONSULTATION: Atrial fibrillation     PRESENTATION  3/19 via ED c/o sudden onset dyspnea. SYNOPSIS OF RELEVANT MEDICAL ISSUES  1. Poor metabolic health incurring multiple co-morbid conditions. 2. Advanced atherosclerosis, thus far subclinical.  3. Parenchymal pulmonary disease (includes emphysema): advanced. Current respiratory failure requiring BIPAP support. 4. Heart failure with reduced ejection fraction: etiology non-ischemic. Presently decompensated. 5. Atrial fibrillation: persistent. Long-standing. Rapid ventricular response, resistant to attempts at medicinal control. Prior amiodarone discontinued, possibly due to pulmonary toxicity. Likely contributing significantly to current decompensated condition. 6. Ventricular tachycardia: non-sustained. 7. Normal intraventricular conduction. 8. Implanted cardioverter-defibrillator system (ICD): normal interim function (see below). Pulse generator is at end of life. 9. Echocardiogram 3/19/21:  a. Left ventricular ejection fraction moderately diminished  b. Right ventricle severely dilated, and with severely reduced ejection fraction  c. Severe tricuspid regurgitation  d. Severe pulmonary arterial hypertension  e. Hemodynamically insignificant pericardial effusion  10. CHADS-VASC > 2: compliant with and tolerant of warfarin and \"low dose\" aspirin. INR 7.1                 THOUGHTS  1. Despite low yield given circumstances (chronic anticoagulation, low DD), an evaluation for recent pulmonary embolism may be prudent. 2.   There is no good option here for restoring sinus rhythm. 3.    Ventricular rate and regularity control would likely be helpful.  Given that this is not achievable with tolerated medicines, would recommend AV node ablation. 4.    Given echocardiogram (in particular right heart) and normal intraventricular conduction, I am concerned about the repercussions of rendering him ventricular pacing-dependent. Nonetheless, I think it is warranted given the circumstances. 5.     In that his ICD pulse generator requires replacement now, will revise his system to a rsynchronization system in tandem. RECOMMENDATIONS              1.    Consider evaluation for pulmonary arterial thrombosis. 2.    Perform catheter ablation of AV node and revision of ICD system to resynchronization system. Will schedule for tomorrow if INR is rendered appropriate. Heidy Theodore MD, [facility]  Cardiac Electrophysiology  [phone]    3/23/21   10:56 AM EDT    I spent 90 minutes with this patient, >50% of which was dedicated to counseling/coordination of care pertaining to the above. CARDIAC IMPLANTABLE ELECTRICAL SYSTEM INTERROGATION    Pulse generator : [employer]    Pulse generator type: E110    Pulse generator battery: At end of life    Programmed bradycardia pacing mode: DDDR 70/120     Presenting rhythm: Atrial fibrillation with rapid ventricular response. Underlying rhythm: Same     Interim pacing (%):   - Atrium: 0  - Ventricle: 0    Lead function:   - Atrium: wnl   - Ventricle: wnl    Interim tachycardia events/burden:   - Atrium: 100% fibrillation    - Ventricle: several detections +/- ATP, all for AF-induced RVR    Programming changes:  DDDR to VVI 70  Two tachycardia zones to single zone (220/min)      03/24/2021 Bryce Grimm is seen in hospital follow-up. He is scheduled for AV node ablation and upgrade of his dual chamber ICD to a CRT-D device. This am he is sitting up in bed on Bi-PAP and dyspnic. Discussed with nursing. Unable to remove Bi-PAP due to clinical/respiratory status. He is unable to lie flat. INR today 2.1.       Review of Systems  Unable to obtain due to clinical status    PHYSICAL EXAM:  Vitals:    03/24/21 0800 03/24/21 0900 03/24/21 0957 03/24/21 1013   BP: 98/61      Pulse: 113 112     Resp: (!) 34 (!) 37 (!) 33 (!) 40   Temp: 98.5 °F (36.9 °C)      TempSrc: Temporal      SpO2: (!) 83% 91% (!) 85%    Weight:       Height:         Constitutional: deferred  Well-developed and cooperative. Head: Normocephalic and atraumatic. Eyes: Conjunctivae are normal.  Cardiovascular: An irregular tachycardic rhythm present. PMI is not displaced. Pulmonary/Chest: Dyspnic, on BiPAP  Abdominal: Soft. No tenderness. Neurological: Deferred, Alert and oriented to person      Plan:     1. Will discuss with Dr Vanita Banegas  2. Clinical care per ICU team.   3. Continue NPO. Thank you for allowing me to participate in their care. I have spent a total of 30 minutes reviewing the above stated recommendations. And a total of >50% of that time involved face-to-face time providing counseling and or coordination of care with the other providers    CAMILLE Geller, [address] [place] [address] Physicians    ADDENDUM:   Discussed with Dr Vanita Banegas. Patient to be intubated. Plan for AV node ablation and device upgrade as scheduled.        CAMILLE Geller, [address] [place] [address] Physicians

## 2021-03-24 NOTE — CARE COORDINATION
For ablation and ICD revision today pending INR. Pt remains on IV bumex, Dig and rocephin. Off pressors. Heated hiflo/Bipap. Baseline O2 4L through [facility]. Per Pt plan is to return home at discharge with family support.

## 2021-03-24 NOTE — PROGRESS NOTES
Date: 3/23/2021    Time: 9:22 PM    Patient Placed On BIPAP/CPAP/ Non-Invasive Ventilation? No    If no must comment. Facial area red/color change? No           If YES are Blister/Lesion present? No   If yes must notify nursing staff  BIPAP/CPAP skin barrier?   Yes    Skin barrier type:mepilexlite       Comments:16/500/95/8        Jamarcus Geiger

## 2021-03-24 NOTE — PLAN OF CARE
Problem: Falls - Risk of:  Goal: Will remain free from falls  Description: Will remain free from falls  Outcome: Met This Shift  Goal: Absence of physical injury  Description: Absence of physical injury  Outcome: Met This Shift     Problem: OXYGENATION/RESPIRATORY FUNCTION  Goal: Patient will maintain patent airway  Outcome: Met This Shift  Goal: Patient will achieve/maintain normal respiratory rate/effort  Description: Respiratory rate and effort will be within normal limits for the patient  Outcome: Met This Shift     Problem: ACTIVITY INTOLERANCE/IMPAIRED MOBILITY  Goal: Mobility/activity is maintained at optimum level for patient  Outcome: Met This Shift     Problem: Skin Integrity:  Goal: Will show no infection signs and symptoms  Description: Will show no infection signs and symptoms  Outcome: Met This Shift  Goal: Absence of new skin breakdown  Description: Absence of new skin breakdown  Outcome: Met This Shift     Problem: Fluid Volume:  Goal: Hemodynamic stability will improve  Description: Hemodynamic stability will improve  Outcome: Met This Shift  Goal: Ability to maintain a balanced intake and output will improve  Description: Ability to maintain a balanced intake and output will improve  Outcome: Met This Shift     Problem: Health Behavior:  Goal: Identification of resources available to assist in meeting health care needs will improve  Description: Identification of resources available to assist in meeting health care needs will improve  Outcome: Met This Shift     Problem: Respiratory:  Goal: Respiratory status will improve  Description: Respiratory status will improve  Outcome: Met This Shift

## 2021-03-24 NOTE — PROGRESS NOTES
Chief Complaint:  Chief Complaint   Patient presents with    Shortness of Breath     usually wears 3-4L. Arrived on NRB @ 99% SPo2     Acute on chronic systolic congestive heart failure (HCC)     Subjective:    He's a poor historian but seems that his dyspnea is slightly better. But still only 89-92% breathing 95% O2 on BiPAP    Objective:    /68   Pulse 100   Temp 100.8 °F (38.2 °C) (Esophageal)   Resp 25   Ht 6' 1\" (1.854 m)   Wt 248 lb 9 oz (112.7 kg)   SpO2 99%   BMI 32.79 kg/m²     Current medications that patient is taking have been reviewed.     General appearance: Chronically ill, fatigued appearing  HEENT: AT/NC, MMM  Neck: FROM, supple  Lungs: Clear to auscultation, somewhat distant lung sounds, WOB normal  CV: RRR, no MRGs  Abdomen: Soft, non-tender; no masses or HSM, +BS  Extremities: No peripheral edema or digital cyanosis  Skin: no rash, lesions or ulcers  Psych: Calm and cooperative  Neuro: Alert and interactive, face symmetric, moving all extremities, speech fluent    Labs:  CBC with Differential:    Lab Results   Component Value Date    WBC 12.7 03/24/2021    RBC 4.69 03/24/2021    HGB 14.2 03/24/2021    HCT 45.9 03/24/2021     03/24/2021    MCV 97.7 03/24/2021    MCH 30.7 03/24/2021    MCHC 31.4 03/24/2021    RDW 18.2 03/24/2021    NRBC 6.1 03/24/2021    LYMPHOPCT 0.9 03/24/2021    MONOPCT 8.7 03/24/2021    BASOPCT 0.2 03/24/2021    MONOSABS 1.14 03/24/2021    LYMPHSABS 0.13 03/24/2021    EOSABS 0.00 03/24/2021    BASOSABS 0.00 03/24/2021     CMP:    Lab Results   Component Value Date     03/24/2021    K 5.7 03/24/2021    K 5.7 03/20/2021     03/24/2021    CO2 21 03/24/2021    BUN 64 03/24/2021    CREATININE 2.3 03/24/2021    GFRAA 33 03/24/2021    LABGLOM 33 03/24/2021    GLUCOSE 108 03/24/2021    PROT 5.9 03/23/2021    LABALBU 2.9 03/23/2021    CALCIUM 9.4 03/24/2021    BILITOT 0.9 03/23/2021    ALKPHOS 52 03/23/2021    AST 25 03/23/2021    ALT 25 03/23/2021 Assessment/Plan:  Principal Problem:    Acute on chronic systolic congestive heart failure (HCC)  Active Problems:    Nonischemic cardiomyopathy (HCC)    S/P ICD (internal cardiac defibrillator) procedure    Essential hypertension    Chronic anticoagulation    Permanent atrial fibrillation (HCC)    Stage 3 chronic kidney disease    Chronic obstructive pulmonary disease (HCC)    Acute on chronic respiratory failure (HCC)    Supratherapeutic INR  Resolved Problems:    * No resolved hospital problems. *       Multifactorial, very severe respiratory failure    Repeat CXR personally reviewed, still with mild to moderate bibasilar infiltrates    Pulmonary HTN, R sided CHF, mild L sided failure, COPD all contributing. D/w Cardiology and Pulm/CCM. RHC tomorrow. AVN ablation and BiV upgrade today    Prognosis guarded. Continue IV diuretics    CKD stable    Lokelma for hyperkalemia. Monitor on telemetry.     INR improved after VK    Requires continued inpatient level of care     Alma Oliva    2:37 PM  3/24/2021

## 2021-03-24 NOTE — PROCEDURES
Arterial line placement    Procedure: Right radial arterial line placement. Indications: Continuous monitoring of blood pressure in a patient with hypotension +/- shock    Anesthesia: Local infiltration of 1% lidocaine. Consent:  The patient provided verbal consent for this procedure. Technique: Time Out: Immediately prior to the procedure a \"timeout\" was called to verify the correct patient and procedure. Procedure was done using strict aseptic technique. Ramon's test was performed and was normal. right radial site was cleaned with chloraprep and draped. Radial artery was identified, then Lidocaine 1% was infiltrated locally. Radial arterial line was inserted, a good blood flow was obtained, after which guidewire was inserted all the way with no resistance. Then the canula was inserted and needle with guidewire was withdrawn. Pulsatile bright red blood flow was observed. The canula was connected to BP monitoring apparatus and a good quality waveform was noted. Then the canula was secured with 2 stay sutures of 3-0 silk after Lidocaine infiltration, following which dressing was applied. Number of sticks: 1. Number of Kits used: 1. Complications: No immediate complication. Estimated blood loss: About 1 ml. Comment: Patient tolerated the procedure well.      Christiano Pratt DO PGY-1  3/23/2021 9:06 PM

## 2021-03-24 NOTE — PROGRESS NOTES
Comprehensive Nutrition Assessment    Type and Reason for Visit:  Initial(LOS ICU)    Nutrition Recommendations/Plan: Continue NPO. Pt just intubated today. Please consult if EN support initiated. Noted K 5.7, Phos 5.0    Nutrition Assessment:  Pt at nutritional risk d/t current intubation w/ worsening respiratory failure since admit. Noted COPD/CHF. NPO at this time- will monitor nutrition progression/provide recs as needed. Malnutrition Assessment:  Malnutrition Status: At risk for malnutrition  Context:  Acute Illness     Findings of the 6 clinical characteristics of malnutrition:  Energy Intake:  50% or less of estimated energy requirements for 5 or more days  Weight Loss:  Unable to assess(no hx on file)     Body Fat Loss:  Unable to assess     Muscle Mass Loss:  Unable to assess    Fluid Accumulation:  No significant fluid accumulation     Strength:  Not Performed    Estimated Daily Nutrient Needs:  Energy (kcal):  PS10: 2293; 2314-6560; Weight Used for Energy Requirements:  Admission     Protein (g):  125-150(1.5-1.8 g/kg); Weight Used for Protein Requirements:  Ideal        Fluid (ml/day):  per critical care    Nutrition Related Findings:  Pt intubated, hypotension on pressors x 2, -I/O's, +1/+2 edema, active BS, hyperkalemia, hyperphosphatemia      Wounds:  None       Current Nutrition Therapies:    Diet NPO Effective Now    Anthropometric Measures:  · Height: 6' 1\" (185.4 cm)  · Current Body Weight: 233 lb (105.7 kg)(3/20 first measured as CBW elevated)   · Admission Body Weight: 233 lb (105.7 kg)(first measured)    · Usual Body Weight: (UTO no EMR hx on file)     · Ideal Body Weight: 184 lbs; % Ideal Body Weight 126.6 %   · BMI: 30.7 BMI Categories: Obese Class 1 (BMI 30.0-34. 9)       Nutrition Diagnosis:   · Inadequate oral intake related to impaired respiratory function as evidenced by intubation, NPO or clear liquid status due to medical condition    Nutrition Interventions: Nutrition Education/Counseling:  Education not appropriate   Coordination of Nutrition Care:  Continue to monitor while inpatient    Goals:  Nutrition progression       Nutrition Monitoring and Evaluation:   Food/Nutrient Intake Outcomes:  Diet Advancement/Tolerance  Physical Signs/Symptoms Outcomes:  Biochemical Data, Nutrition Focused Physical Findings, Skin, Weight, GI Status, Fluid Status or Edema, Hemodynamic Status     Discharge Planning:     Too soon to determine     Electronically signed by Petr Brown RD, LD on 3/24/21 at 3:04 PM EDT    Contact: Ext [phone]

## 2021-03-24 NOTE — PROGRESS NOTES
3/24/2021 3:44 PM  Service: Urology  Group: [facility] ([facility])    Marcianne Hatchet  [id number]    Subjective:    Remains in the ICU  He is now intubated   His daughter is present  Roman draining yellow urine     Review of Systems  Unable to obtain due to intubated       Scheduled Meds:   etomidate        rocuronium        chlorhexidine  15 mL Mouth/Throat BID    norepinephrine        midazolam  4 mg Intravenous Once    sodium zirconium cyclosilicate  5 g Oral TID    [Held by provider] bumetanide  1 mg Intravenous BID    metoprolol succinate  25 mg Oral BID    digoxin  62.5 mcg Intravenous Daily    pantoprazole  40 mg Oral QAM AC    budesonide  0.5 mg Nebulization BID    cefTRIAXone (ROCEPHIN) IV  1,000 mg Intravenous Q24H    Arformoterol Tartrate  15 mcg Nebulization BID    allopurinol  100 mg Oral BID    aspirin  81 mg Oral Daily    ferrous sulfate  325 mg Oral Daily with breakfast    finasteride  5 mg Oral Daily    [Held by provider] isosorbide mononitrate  30 mg Oral Daily    levothyroxine  125 mcg Oral Daily    pravastatin  20 mg Oral Daily    ipratropium-albuterol  1 ampule Inhalation Q4H WA    sodium chloride flush  10 mL Intravenous 2 times per day       Objective:  Vitals:    03/24/21 1500   BP:    Pulse: 105   Resp: 25   Temp:    SpO2: 100%         Allergies: Patient has no known allergies.     General Appearance: intubated, ill appearing   Skin: no rash or erythema  Head: normocephalic and atraumatic, ETT present   Pulmonary/Chest: normal air movement,  on ventilator  Abdomen: soft, non-tender, non-distended  Genitourinary: roman intact draining yellow urine   Extremities: no cyanosis, clubbing or edema         Labs:     Recent Labs     03/24/21  1240      K 5.7*      CO2 21*   BUN 64*   CREATININE 2.3*   GLUCOSE 108*   CALCIUM 9.4       Lab Results   Component Value Date    HGB 14.2 03/24/2021    HCT 45.9 03/24/2021         Assessment/Plan:  Azeb Rose Hematuria  Malpositioned roman s/p replacement by Dr. Anna Darnell   Hx of elevated PSA     Cont the roman catheter   This should be left indwelling for now  He can undergo a voiding trial when he is medically stable and ambulatory   He follows with Dr. Renaldo Neal with [facility]   He was last seen there 3 months ago, PSA at that time was 32 and has history of elevated PSA  Daughter unsure if he has ever had biopsy  Explained to daughter that upon discharge, it is important that he follow up again with Dr. Alvarez Justin for repeat PSA   No further  interventions are planned at this time  Please call with any further questions or concerns  Thank you for allowing us to participate in his care         RIVERA Sterling - JOSELO DE JESUS  Urology

## 2021-03-24 NOTE — PROGRESS NOTES
Occupational Therapy      OT consult received and appreciated. Chart reviewed. Discussed pt case at quality flow rounds. Will hold evaluation due to respiratory status. Will evaluate at a later time. Thank you.  Mell Shin, OTR/L #[id number]

## 2021-03-24 NOTE — PROGRESS NOTES
[facility]  Department of Internal Medicine   Internal Medicine Residency   MICU Progress Note    Patient:  Joellen Houser 66 y.o. male  MRN: [id number]     Date of Service: 3/24/2021    Allergy: Patient has no known allergies. Brief summary of initial consult:     Mr. Joellen Houser with past medical history of nonischemic cardiomyopathy status post ICD in 2011, permanent atrial fibrillation on warfarin, heart failure with reduced ejection fraction EF 25% (2018): Chronic kidney disease stage IIIb, COPD on 3 L nasal cannula at home, benign prostatic hyperplasia, hypertension, hyperlipidemia, hypothyroidism, morbid obesity. Initially presented to the ED on 3/19/2021 chief complaint of dyspnea on exertion, and hypoxia. Initial assessment vital signs are stable, satting 99% on high flow nasal cannula. on examination he was fluid overload. proBNP were 12,394. Chest x-ray noted for pulmonary congestion. rest of the labs were unremarkable. He received 40 mg Lasix and aspirin and was admitted to the telemetry floor. On the floor he was unable to wean off high flow nasal cannula required BiPAP. He also had episodes of hypotension and V. Tach. For further care and management he was transferred to the medical ICU.   (March 21)    Subjective     Patient was seen and examined this morning   Alert and oriented x 3 in the AM   Intubated for ablation and replacement of ICD  Remains in Afib RVR  ROS: Denies chest pain, fever, cough   Family (niece)  updated at bedside       24h change: Remain tachycardic ranges 116 to123    Objective     VS: /68   Pulse 100   Temp 100.8 °F (38.2 °C) (Esophageal)   Resp 25   Ht 6' 1\" (1.854 m)   Wt 248 lb 9 oz (112.7 kg)   SpO2 99%   BMI 32.79 kg/m²   ABP (Arterial line BP): 100/61  ABP mean (Arterial line mean): 70 mmHg    I & O - 24hr:     Intake/Output Summary (Last 24 hours) at 3/24/2021 1437  Last data filed at 3/24/2021 1339  Gross per 24 hour Intake 125 ml   Output 661 ml   Net -536 ml       Physical Exam:  General Appearance: Sedated intubated  Neck: no adenopathy, no carotid bruit, no JVD, supple, symmetrical, trachea midline and thyroid not enlarged, symmetric, no tenderness/mass/nodules  Lung: rhonchi bilaterally  Heart: irregular rate, S1, S2 normal, no murmur, click, rub or gallop  Abdomen: soft, non-tender; bowel sounds normal; no masses,  no organomegaly  Extremities:  extremities normal, atraumatic, no cyanosis or edema  Musculoskeletal: No joint swelling, no muscle tenderness. ROM normal in all joints of extremities.    Neurologic: Sedated and intubated    Lines     site day    Art line   L Radial    TLC None    PICC None    Hemoaccess None    Oxygen:    BIPAP and high flow nasal canula   Mechanical Ventilation:    ABG:     Lab Results   Component Value Date    PH 7.329 03/24/2021    PCO2 44.0 03/24/2021    PO2 47.9 03/24/2021    HCO3 22.6 03/24/2021    BE -3.4 03/24/2021    THB 15.9 03/24/2021    O2SAT 80.5 03/24/2021        Medications     Infusions: (Fluid, Sedation, Vasopressors)  IVF:     Vasopressors  Off pressers     Sedation  Off sedation     Nutrition:     General diet     ATB:   Antibiotics  Days   ceftraixone  3           Cultures:   negative     Consults:   Cardiology     Labs     CBC with Differential:    Lab Results   Component Value Date    WBC 12.7 03/24/2021    RBC 4.69 03/24/2021    HGB 14.2 03/24/2021    HCT 45.9 03/24/2021     03/24/2021    MCV 97.7 03/24/2021    MCH 30.7 03/24/2021    MCHC 31.4 03/24/2021    RDW 18.2 03/24/2021    NRBC 6.1 03/24/2021    LYMPHOPCT 0.9 03/24/2021    MONOPCT 8.7 03/24/2021    BASOPCT 0.2 03/24/2021    MONOSABS 1.14 03/24/2021    LYMPHSABS 0.13 03/24/2021    EOSABS 0.00 03/24/2021    BASOSABS 0.00 03/24/2021     CMP:    Lab Results   Component Value Date     03/24/2021    K 5.7 03/24/2021    K 5.7 03/20/2021     03/24/2021    CO2 21 03/24/2021    BUN 64 03/24/2021 CREATININE 2.3 03/24/2021    GFRAA 33 03/24/2021    LABGLOM 33 03/24/2021    GLUCOSE 108 03/24/2021    PROT 5.9 03/23/2021    LABALBU 2.9 03/23/2021    CALCIUM 9.4 03/24/2021    BILITOT 0.9 03/23/2021    ALKPHOS 52 03/23/2021    AST 25 03/23/2021    ALT 25 03/23/2021     Warfarin PT/INR:  No components found for: PTPATWAR, PTINRWAR  ABG:    Lab Results   Component Value Date    PH 7.329 03/24/2021    PCO2 44.0 03/24/2021    PO2 47.9 03/24/2021    HCO3 22.6 03/24/2021    BE -3.4 03/24/2021    O2SAT 80.5 03/24/2021       Imaging Studies:  CXR:   Chest x ray march 22   Heart is significantly enlarged.  Some prominence of the pulmonary arteries   suggest pulmonary hypertension. Edgar Raja is worsening infiltrate in the right   lower lobe.  Evaluation left retrocardiac region is limited.  There are no   obvious effusions. ICT chest, 21 march 2020    No evidence of a pulmonary nodule in the area of abnormality noted on recent   chest radiograph.  The radiographic finding was reflective of the severely   central pulmonary arteries.  The main pulmonary artery is dilated up to 6 cm,   consistent with pulmonary artery hypertension.  Additionally, there is severe   cardiomegaly with a small pericardial effusion and small, partially loculated   bilateral pleural effusions (right greater than left).     Moderate centrilobular emphysema.       2-3 mm subpleural pulmonary nodule in the right lower lobe.  If the patient   is at high risk for development of lung carcinoma, a follow-up CT of the   chest could be considered in 1 year to document stability.       Mildly enlarged mediastinal lymph nodes, of uncertain etiology or clinical   significance.        Resident's Assessment and Plan     [facility]   , 66 y.o. , male came with CC : Hypoxia and hypotension     has a past medical history of Arrhythmia, Atrial fibrillation ([facility] [address] CHF (congestive heart failure) ([facility] [address] CKD (chronic kidney disease), Hyperlipidemia, setting of shock  on warfarin with INR goal of 2-2.5  Pharmacy to dose warfarin, appreciate input  Telemetry monitoring  Cardiology following  Continue atorvastatin 20 mg, aspirin 81 mg  Continue digoxin 62.5 mcg daily        PULM  Acute on chronic hypoxic respiratory failure secondary to ADHF versus cardiogenic shock  Patient has a history of COPD requiring 3 L of nasal cannula at home. He was found to have hypoxic and put on high flow nasal cannula at urologist office. Patient desatted on the floor and put on BiPAP. Tolerating BiPAP well. No signs of infection or COPD exacerbation at this point. Chest x-ray noted for bulge in the left suprahilar region suspicious for mass or adenopathy  Unlikely PE as patient is on warfarin. D-dimer less than 2000  Not a candidate for CTA due to CKD  May need V/Q scan  Hold Bume  Intubated this morning for EP procedures  Breathing treatment with Brovana and DuoNeb and Pulmicort    NEPHRO(Fluids/ Electrolytes & Nutrition):   Acute kidney injury stage I prerenal on chronic kidney disease stage IIIb  Patient has baseline creatinine 1.7, FeUrea is 20.7 suggesting prerenal cause. Continue gentle diuresis  Keep input and output record  Creatinine is 2.2, this may be his new baseline       Gross hematuria due to malposition Cohen catheter  Urology consulted, appreciate recommendations  Monitor urine output     History of elevated PSA 27, follows with Dr. Kendra Milton  Continue finasteride 5 mg    HEME/ONC  Leukocytosis likely reactionary  White count jumped from 5-12. Inflammatory marker and procalcitonin negative  Negative panculture, Legionella, respiratory panel and strep pneumo antigen  Trending down    Hyperkalemia  Patient potassium is consistently above 5.5.   Status post calcium gluconate and insulin  Repeat BMP  5 units of insulin and D5  Monitor electrolytes    ENDOCRINE  History of hyperlipidemia and hypothyroidism  Continue pravastatin and Synthroid 125 mcg    Next of Kin/ POA:   Sesar Elder  Sister  [phone]    Code Status:   Full code    PT/OT: Will consult once patient is stable  DVT ppx: Warfarin  GI ppx: Protonix  Disposition: Continue ICU care   Nutrition: N.p.o. for EP procedures and heart cath. Avoid hypoglycemia    Lesley Esquivel MD, PGY-1  Internal Medicine   Attending physician: Dr. Carrie Moses     I personally saw, examined and provided care for the patient. Radiographs, labs and medication list were reviewed by me independently. I spoke with bedside nursing, therapists and consultants. Critical care services and times documented are independent of procedures and multidisciplinary rounds with Residents. Additionally comprehensive, multidisciplinary rounds were conducted with the MICU team. The case was discussed in detail and plans for care were established. Review of Residents documentation was conducted and revisions were made as appropriate. I agree with the above documented exam, problem list and plan of care. Jose Cruz Joshi M.D.    Pulmonary/Critical Care Medicine   42 min cct excluding procedures

## 2021-03-24 NOTE — PROGRESS NOTES
Physical Therapy  Name: Cal Goldberg  Room: 9423/0372-S  Date: 03/24/21    PT consult received and appreciated. PT eval on hold at this time for respiratory status and potential procedure this date. Discussed with RN.      Genet Douglass, PT, DPT  [id number]

## 2021-03-24 NOTE — PROGRESS NOTES
Pharmacy Consultation Note  (Anticoagulant Dosing and Monitoring)    Initial consult date: 3/21/21  Consulting physician: Dr Tanya Lemon    Allergies:  Patient has no known allergies. 66 y.o. male      Ht Readings from Last 1 Encounters:   03/19/21 6' 1\" (1.854 m)     Wt Readings from Last 1 Encounters:   03/24/21 248 lb 9 oz (112.7 kg)         Warfarin Indication Target   INR [place] Dose  (if applicable) Diet/Feeding Tube   Afib 2 - 3 2 mg five times weekly  3 mg two times weekly NPO     Vitamin K or Blood product  Administration Date     Phytonadione 5 mg x1 3/23         Warfarin drug-drug interactions  Start  Stop Home Med? Comments    Allopurinol 100 mg PO BID 3/20  Yes    ASA 81 mg PO daily 3/19  Yes    Levothyroxine 125 mcg PO daily 3/19  Yes            Hepatic Function Panel:                            Lab Results   Component Value Date    ALKPHOS 52 03/23/2021    ALT 25 03/23/2021    AST 25 03/23/2021    PROT 5.9 03/23/2021    BILITOT 0.9 03/23/2021    BILIDIR 0.5 03/23/2021    IBILI 0.4 03/23/2021    LABALBU 2.9 03/23/2021       Date Warfarin Dose INR Heparin or LMWH HBG/HCT PLT Comment   3/20 2 mg (0018)  2 mg (1606) 2.6 -- 14.6/46.7 153    3/21 Hold dose 2.9 -- 14.3/45.1 136 Pharmacy consulted   3/22 Hold 4.4 -- -- --    3/23 Hold 7.1 -- 14.5/45 133 Phytonadione 5 mg x1 for procedure tomorrow   2/24 Hold 2.1 -- 14.4/45.8 144      Assessment:  · 65 yo M admitted with SOB and acute decompensated HF  · Pt on chronic anticoagulation with Warfarin for Afib - home regimen is Warfarin 3 mg on Tu/Th and 2 mg the rest of the week   · Pharmacy consulted on 3/21 per Dr Tanya Lemon to dose/manage Warfarin  · INR goal 2 - 3    Plan:  · Hold warfarin tonight  · Daily PT/INR until the INR is stable within the therapeutic range. Will continue to follow. Thank you for the consult.   Jeanette Barger, PharmD, BCPS 3/24/2021 11:14 AM

## 2021-03-24 NOTE — PROGRESS NOTES
[facility] Quality Flow/Interdisciplinary Rounds Progress Note        Quality Flow Rounds held on March 24, 2021    Disciplines Attending:  Bedside nurse, charge nurse, , PT/OT, pharmacy, nursing unit leadership, , Medical residents    Michelle Roger was admitted on 3/19/2021 11:34 AM    Anticipated Discharge Date:       Disposition:    Srinivas Score:  Srinivas Scale Score: 14    Readmission Risk              Risk of Unplanned Readmission:        18           Discussed patient goal for the day, patient clinical progression, and barriers to discharge. The following Goal(s) of the Day/Commitment(s) have been identified:  Pacemaker, intubated, continue icu care.        [facility]  March 24, 2021

## 2021-03-24 NOTE — FLOWSHEET NOTE
Patient unable to follow safety commands at this time, soft biltateral wrist restraints remain in place for patients safety.

## 2021-03-24 NOTE — PLAN OF CARE
Patient's chart updated to reflect:      . - HF care plan, HF education points and HF discharge instructions.  -Orders: 2 gram sodium diet, daily weights, I/O. Patient will be seen inpatient for formal CHF teaching session once transferred out of ICU. -Secure email sent to [facility] to obtain hospital follow up appointment.      Asha Mercado RN, BSN  Heart Failure Navigator

## 2021-03-24 NOTE — ANESTHESIA PRE PROCEDURE
Department of Anesthesiology  Preprocedure Note       Name:  Lauren Haskins   Age:  66 y.o.  :  1945                                          MRN:  [id number]         Date:  3/24/2021      Surgeon: Dalbert Romberg    Procedure: ICD UPGRADE TO BIV- POSS AV NODE ABLATION    Medications prior to admission:   Prior to Admission medications    Medication Sig Start Date End Date Taking? Authorizing Provider   metoprolol succinate (TOPROL XL) 50 MG extended release tablet Take 75 mg by mouth daily Pt to take 50mg in the morning and 25mg in the evening    Historical Provider, MD   finasteride (PROSCAR) 5 MG tablet Take 5 mg by mouth daily    Historical Provider, MD LARATOVEN 2 MG tablet 2mg daily x 5 days per week 19   Historical Provider, MD GREENE 3 MG tablet 3mg 2 days per week 19   Historical Provider, MD   isosorbide mononitrate (IMDUR) 30 MG extended release tablet Take 1 tablet by mouth daily 19   Casi Gomez MD   acetaminophen (TYLENOL) 500 MG tablet Take 500 mg by mouth as needed for Pain     Historical Provider, MD   ammonium lactate (LAC-HYDRIN) 12 % lotion Apply topically as needed  17   Historical Provider, MD   aspirin 81 MG tablet Take 81 mg by mouth daily    Historical Provider, MD   furosemide (LASIX) 40 MG tablet Take 20 mg by mouth daily     Historical Provider, MD   levothyroxine (SYNTHROID) 125 MCG tablet Take 125 mcg by mouth daily  16   Historical Provider, MD   pravastatin (PRAVACHOL) 20 MG tablet Take 20 mg by mouth daily  16   Historical Provider, MD   OXYGEN Inhale 4 L into the lungs continuous     Historical Provider, MD   ferrous sulfate 325 (65 FE) MG tablet Take 325 mg by mouth daily (with breakfast). Historical Provider, MD   allopurinol (ZYLOPRIM) 100 MG tablet Take 100 mg by mouth 2 times daily.       Historical Provider, MD   VITAMIN D, CHOLECALCIFEROL, PO Take 2,000 Units by mouth daily     Historical Provider, MD   tiotropium (SPIRIVA) 18 MCG inhalation capsule Inhale 18 mcg into the lungs daily.       Historical Provider, MD       Current medications:    Current Facility-Administered Medications   Medication Dose Route Frequency Provider Last Rate Last Admin    etomidate (AMIDATE) 2 MG/ML injection             rocuronium (ZEMURON) 50 MG/5ML injection             chlorhexidine (PERIDEX) 0.12 % solution 15 mL  15 mL Mouth/Throat BID Cata Leary MD        norepinephrine (LEVOPHED) 16 mg in dextrose 5% 250 mL infusion  2-100 mcg/min Intravenous Continuous Ulysses Holland, MD 46.9 mL/hr at 03/24/21 1115 50 mcg/min at 03/24/21 1115    norepinephrine (LEVOPHED) 64 MCG/ML infusion SOLN             fentaNYL 5 mcg/ml in 0.9%  ml infusion  12.5-200 mcg/hr Intravenous Continuous Ulysses Holland, MD 20,000 mL/hr at 03/24/21 1134 100 mg/hr at 03/24/21 1134    vasopressin 20 Units in dextrose 5 % 100 mL infusion  0.03 Units/min Intravenous Continuous Cata Leary MD 9 mL/hr at 03/24/21 1156 0.03 Units/min at 03/24/21 1156    midazolam PF (VERSED) injection 4 mg  4 mg Intravenous Once Cata Leary MD        [Held by provider] bumetanide (BUMEX) injection 1 mg  1 mg Intravenous BID Ulysses Holland, MD   1 mg at 03/24/21 0747    metoprolol succinate (TOPROL XL) extended release tablet 25 mg  25 mg Oral BID Johnathan Cordova MD   25 mg at 03/24/21 0413    digoxin (LANOXIN) injection 62.5 mcg  62.5 mcg Intravenous Daily Iker Schaeffer MD   62.5 mcg at 03/24/21 0748    pantoprazole (PROTONIX) tablet 40 mg  40 mg Oral QAM AC Cata Leary MD   40 mg at 03/23/21 0544    budesonide (PULMICORT) nebulizer suspension 500 mcg  0.5 mg Nebulization BID Ulysses Holland, MD   500 mcg at 03/24/21 0950    glucose (GLUTOSE) 40 % oral gel 15 g  15 g Oral PRN Pat Harrison, DO        dextrose 50 % IV solution  12.5 g Intravenous PRN Pat Harrison, DO   12.5 g at 03/23/21 0909    glucagon (rDNA) injection 1 mg  1 mg Intramuscular PRN Pat Casey DO        dextrose 5 % solution  100 mL/hr Intravenous PRN Mario July, DO        cefTRIAXone (ROCEPHIN) 1,000 mg in sterile water 10 mL IV syringe  1,000 mg Intravenous Q24H Macy Aguilar Chi., DO   1,000 mg at 03/24/21 0747    Arformoterol Tartrate (BROVANA) nebulizer solution 15 mcg  15 mcg Nebulization BID Macy Tomlin Jr., DO   15 mcg at 03/24/21 6528    allopurinol (ZYLOPRIM) tablet 100 mg  100 mg Oral BID Jamilah Justin MD   100 mg at 03/24/21 3988    aspirin chewable tablet 81 mg  81 mg Oral Daily Jamilah Justin MD   Stopped at 03/24/21 1151    ferrous sulfate (IRON 325) tablet 325 mg  325 mg Oral Daily with breakfast Jamilah Justin MD   325 mg at 03/24/21 4338    finasteride (PROSCAR) tablet 5 mg  5 mg Oral Daily Jamilah Justin MD   5 mg at 03/24/21 0747    [Held by provider] isosorbide mononitrate (IMDUR) extended release tablet 30 mg  30 mg Oral Daily Valentin Carlisle MD   Stopped at 03/22/21 1200    levothyroxine (SYNTHROID) tablet 125 mcg  125 mcg Oral Daily Jamilah Justin MD   125 mcg at 03/24/21 0748    pravastatin (PRAVACHOL) tablet 20 mg  20 mg Oral Daily Jamilah Justin MD   20 mg at 03/24/21 0748    ipratropium-albuterol (DUONEB) nebulizer solution 1 ampule  1 ampule Inhalation Q4H WA Jamilah Justin MD   1 ampule at 03/24/21 0950    sodium chloride flush 0.9 % injection 10 mL  10 mL Intravenous 2 times per day Jamilah Justin MD   10 mL at 03/23/21 2112    sodium chloride flush 0.9 % injection 10 mL  10 mL Intravenous PRN Jamilah Justin MD   10 mL at 03/23/21 1600    magnesium hydroxide (MILK OF MAGNESIA) 400 MG/5ML suspension 30 mL  30 mL Oral Daily PRN Jamilah Justin MD        acetaminophen (TYLENOL) tablet 650 mg  650 mg Oral Q6H PRN Jamilah Justin MD        Or   Kimberly Garciaderick acetaminophen (TYLENOL) suppository 650 mg  650 mg Rectal Q6H PRN Jamilah Justin MD        perflutren lipid microspheres (DEFINITY) injection 1.65 mg  1.5 mL Intravenous ONCE PRN Elizabeth Ford MD           Allergies:  No Known Allergies    Problem List:    Patient Active Problem List   Diagnosis Code    Nonischemic cardiomyopathy ([facility] I42.8    Arrhythmia I49.9    Essential hypertension I10    Hyperlipidemia E78.5    Hypothyroid E03.9    S/P ICD (internal cardiac defibrillator) procedure Z95.810    Chronic anticoagulation Z79.01    Permanent atrial fibrillation (HCC) I48.21    Chronic systolic heart failure (HCC) I50.22    Stage 3 chronic kidney disease N18.30    Chronic obstructive pulmonary disease (HCC) J44.9    Acute on chronic systolic congestive heart failure (HCC) I50.23    Acute on chronic respiratory failure (HCC) J96.20    Supratherapeutic INR R79.1       Past Medical History:        Diagnosis Date    Arrhythmia     Atrial fibrillation ([facility]     CHF (congestive heart failure) (HCC)     CKD (chronic kidney disease)     Hyperlipidemia     Hypertension     Nonischemic cardiomyopathy ([facility]     Thyroid disease        Past Surgical History:        Procedure Laterality Date    CARDIAC DEFIBRILLATOR PLACEMENT  6/21/11    HERNIA REPAIR         Social History:    Social History     Tobacco Use    Smoking status: Former Smoker     Packs/day: 2.00     Years: 40.00     Pack years: 80.00     Types: Cigarettes     Quit date: 1/1/2006     Years since quitting: 15.2    Smokeless tobacco: Never Used   Substance Use Topics    Alcohol use: Not Currently                                Counseling given: Not Answered      Vital Signs (Current):   Vitals:    03/24/21 1000 03/24/21 1013 03/24/21 1100 03/24/21 1200   BP: 100/66   114/68   Pulse: 108  112 131   Resp: (!) 39 (!) 40 (!) 37 25   Temp:   38.2 °C (100.8 °F) 38.2 °C (100.8 °F)   TempSrc:   Esophageal Esophageal   SpO2: (!) 85%  90% 98%   Weight:       Height:                                                  BP Readings from Last 3 Encounters:   03/24/21 114/68   02/18/21 110/60   08/27/19 (!) 98/50 NPO Status:                                                                                 BMI:   Wt Readings from Last 3 Encounters:   03/24/21 248 lb 9 oz (112.7 kg)   02/18/21 214 lb (97.1 kg)   08/27/19 203 lb 9.6 oz (92.4 kg)     Body mass index is 32.79 kg/m². CBC:   Lab Results   Component Value Date    WBC 12.7 03/24/2021    RBC 4.69 03/24/2021    HGB 14.4 03/24/2021    HCT 45.8 03/24/2021    MCV 97.7 03/24/2021    RDW 18.2 03/24/2021     03/24/2021       CMP:   Lab Results   Component Value Date     03/24/2021    K 5.79 03/24/2021    K 5.7 03/20/2021     03/24/2021    CO2 27 03/24/2021    BUN 61 03/24/2021    CREATININE 2.2 03/24/2021    GFRAA 35 03/24/2021    LABGLOM 35 03/24/2021    GLUCOSE 105 03/24/2021    PROT 5.9 03/23/2021    CALCIUM 9.4 03/24/2021    BILITOT 0.9 03/23/2021    ALKPHOS 52 03/23/2021    AST 25 03/23/2021    ALT 25 03/23/2021       POC Tests: No results for input(s): POCGLU, POCNA, POCK, POCCL, POCBUN, POCHEMO, POCHCT in the last 72 hours.     Coags:   Lab Results   Component Value Date    PROTIME 22.7 03/24/2021    INR 2.1 03/24/2021    APTT 43.8 03/19/2021       HCG (If Applicable): No results found for: PREGTESTUR, PREGSERUM, HCG, HCGQUANT     ABGs: No results found for: PHART, PO2ART, FQY7OEK, MYH7FCX, BEART, N5PMEVXY     Type & Screen (If Applicable):  No results found for: LABABO, LABRH    Drug/Infectious Status (If Applicable):  No results found for: HIV, HEPCAB    COVID-19 Screening (If Applicable):   Lab Results   Component Value Date    COVID19 Not Detected 03/21/2021           Anesthesia Evaluation    Airway:         Dental:          Pulmonary:   (+) COPD:      (-) not a current smoker (quit 2006)                          ROS comment: O2 4O nc cont   Cardiovascular:  Exercise tolerance: poor (<4 METS),   (+) hypertension:, pacemaker: AICD, dysrhythmias: atrial fibrillation, CHF (NICM): systolic, KING:,             Echocardiogram reviewed    Cleared by cardiology              Neuro/Psych:               GI/Hepatic/Renal:   (+) renal disease (stage 3): CRI, morbid obesity         ROS comment: BPH. Endo/Other:    (+) hypothyroidism, blood dyscrasia: anticoagulation therapy:., .          Pt had no PAT visit       Abdominal:           Vascular:                                        Anesthesia Plan      MAC     ASA 4       Induction: intravenous. Anesthetic plan and risks discussed with patient. Use of blood products discussed with patient whom. Plan discussed with attending.                   RIVERA Reed - CRNA   3/24/2021

## 2021-03-25 PROBLEM — R65.21 SEPTIC SHOCK (HCC): Status: ACTIVE | Noted: 2021-01-01

## 2021-03-25 PROBLEM — A41.9 SEPTIC SHOCK (HCC): Status: ACTIVE | Noted: 2021-01-01

## 2021-03-25 NOTE — PLAN OF CARE
Problem: Falls - Risk of:  Goal: Will remain free from falls  Description: Will remain free from falls  3/25/2021 0123 by Trent Bocanegra  Outcome: Met This Shift     Problem: Falls - Risk of:  Goal: Absence of physical injury  Description: Absence of physical injury  3/25/2021 0123 by Trent Bocanegra  Outcome: Met This Shift     Problem: Skin Integrity:  Goal: Will show no infection signs and symptoms  Description: Will show no infection signs and symptoms  3/25/2021 0123 by Trent Bocanegra  Outcome: Met This Shift     Problem: Skin Integrity:  Goal: Absence of new skin breakdown  Description: Absence of new skin breakdown  3/25/2021 0123 by Trent Bocanegra  Outcome: Met This Shift     Problem: Non-Violent Restraints  Goal: No harm/injury to patient while restraints in use  3/25/2021 0123 by Trent Bocanegra  Outcome: Met This Shift     Problem: Non-Violent Restraints  Goal: Patient's dignity will be maintained  3/25/2021 0123 by Trent Bocanegra  Outcome: Met This Shift     Problem: Non-Violent Restraints  Goal: Removal from restraints as soon as assessed to be safe  3/25/2021 0123 by Trent Bocanegra  Outcome: Not Met This Shift

## 2021-03-25 NOTE — PROCEDURES
Central Line Insertion     Procedure: right internal jugular vein triple lumen catheter placement. Indications: vascular access, poor peripheral access, hypovolemia, central venous monitoring and need for frequent blood draws    Consent: The family members were counseled regarding the procedure, its indications, risks, potential complications and alternatives, and any questions were answered. Consent was obtained to proceed. Number of sticks: 1    Number of Kits used: 1    Procedure: Time Out: Immediately prior to the procedure a \"timeout\" was called to verify the correct patient and procedure. The patient was place in the trendelenburg position and the skin over the right internal jugular vein was prepped with Chloraprep. Local anesthesia was obtained by infiltration using 5.0 cc of 1% Lidocaine without epinephrine. With ultrasound guidance a large bore needle was used to identify the vein, dark non pulsatile blood returned. The guide wire was then inserted through the needle with minimal resistance. 2 mm nick was made in the skin beside the guidewire. Then a dilator was inserted and removed. A triple lumen catheter was then inserted into the vessel over the guide wire using the Seldinger technique to the 15 cm marcella. All ports showed good, free flowing blood return and were flushed with saline solution. The catheter was then securely fastened to the skin with sutures and covered with a bio patch and sterile dressing. A post procedure X-ray was ordered and showed good line position. Complications: None   The patient tolerated the procedure well. Estimated blood loss: 5 ml.     Alvaro Le MD PGY-2  3/25/2021  12:24 PM

## 2021-03-25 NOTE — PROGRESS NOTES
Pharmacy Consultation Note  (Antibiotic Dosing and Monitoring)    Initial consult date: 3/25/21  Consulting physician: Dr. Tera Slaughter  Drug: Vancomycin  Indication: Sepsis    Age/  Gender Height Weight IBW Dosing weight  Allergy Information   75 y.o./male 6' 1\" 112.7 kg   Ideal body weight: 79.9 kg (176 lb 2.4 oz)  Adjusted ideal body weight: 93 kg (205 lb 1.8 oz)  93 kg  Patient has no known allergies. Date  WBC BUN SCr CrCl  (mL/min) Drug/Dose Time   Given Level(s)   (Time) Comments   3/25 12.7 71 2.6  31   Vancomycin 2250 mg IV x1  <0100>     3/26 - - - - Vancomycin 1500 mg IV q24h <0100>                                 Intake/Output Summary (Last 24 hours) at 3/25/2021 0041  Last data filed at 3/25/2021 0000  Gross per 24 hour   Intake 684.43 ml   Output 357 ml   Net 327.43 ml       Historical Cultures:  No results found for: ORG  No results for input(s): BC in the last 72 hours. Cultures:  available culture and sensitivity results were reviewed in EPIC    Assessment:  66 y.o. male has been initiated Vancomycin for sepsis of unknown etiology. Estimated CrCl = 31 mL/min  Goal trough level = 15-20 mcg/mL  Loading dose of 2250 mg IV x1 has been ordered     Plan:   Will initiate vancomycin at a dose of 1500 mg IV q24h  Monitor renal function   Clinical pharmacy to follow      GUSTAVO Richardson [facility] 3/25/2021 12:41 AM

## 2021-03-25 NOTE — CONSULTS
Department of Internal Medicine  Nephrology Consult Note      Reason for Consult:  ANKUR on CKD with oliguria  Requesting Physician:  Gala Pearce MD    CHIEF COMPLAINT:  SOB and presyncope    History Obtained From:  electronic medical record    HISTORY OF PRESENT ILLNESS:  Briefly Marleny Rojas is 66 y.o. male with history of nonischemic cardiomyopathy status post ICD (EF 25%), permanent atrial fibrillation, at coagulation with Coumadin, chronic heart failure with reduced ejection fraction (25%), CKD, COPD hypertension, and hypothyroidism, who was admitted on 3/19/2021 dizziness and presyncopal events with hypoxia. Patient was being evaluated at his urologist office where he developed dyspnea on exertion as well as presyncope. Patient had worsening hypoxia and hypotension after admission was transferred to the ICU where he was being maintained on BiPAP as well as OptiFlow with nonrebreather for hypoxia. Patient was diuresed with Bumex drip and eventually transitioned to Bumex 1 mg IV twice daily. During his hospitalization he was found to have severe pulmonary hypertension, severe tricuspid regurgitation,  right ventricular failure, and left ventricular heart failure on echocardiogram.  Electrophysiology evaluated the patient and there are plans to place a pacemaker as well as perform AV node ablation and evaluate the patient for causes of pulmonary hypertension. Patient was intubated in preparation for this procedure due to continued need for BiPAP versus OptiFlow. After patient was intubated, patient became feverish as well as hypotensive. Patient is currently being evaluated for causes of his shock. This point in time electrophysiology procedures are canceled. Nephrology has been consulted due to oliguric state with worsening chronic kidney disease and edema.               Past Medical History:        Diagnosis Date    Arrhythmia     Atrial fibrillation ([address]     CHF (congestive heart failure) ([address]  CKD (chronic kidney disease)     Hyperlipidemia     Hypertension     Nonischemic [facility] [address]     Thyroid disease      Past Surgical History:        Procedure Laterality Date    CARDIAC DEFIBRILLATOR PLACEMENT  6/21/11    HERNIA REPAIR       Current Medications:    Current Facility-Administered Medications: piperacillin-tazobactam (ZOSYN) 3,375 mg in dextrose 5 % 100 mL IVPB extended infusion (mini-bag), 3,375 mg, Intravenous, Q8H  [START ON 3/26/2021] vancomycin 1500 mg in dextrose 5% 300 mL IVPB, 15 mg/kg (Adjusted), Intravenous, Q24H  heparin (porcine) injection 6,730 Units, 60 Units/kg, Intravenous, Once  heparin (porcine) injection 6,730 Units, 60 Units/kg, Intravenous, PRN  heparin (porcine) injection 3,360 Units, 30 Units/kg, Intravenous, PRN  heparin 25,000 units in dextrose 5% 250 mL (premix) infusion, 5-30 Units/kg/hr, Intravenous, Continuous  chlorhexidine (PERIDEX) 0.12 % solution 15 mL, 15 mL, Mouth/Throat, BID  norepinephrine (LEVOPHED) 16 mg in dextrose 5% 250 mL infusion, 2-100 mcg/min, Intravenous, Continuous  fentaNYL 5 mcg/ml in 0.9%  ml infusion, 12.5-200 mcg/hr, Intravenous, Continuous  vasopressin 20 Units in dextrose 5 % 100 mL infusion, 0.03 Units/min, Intravenous, Continuous  midazolam PF (VERSED) injection 4 mg, 4 mg, Intravenous, Once  glucose (GLUTOSE) 40 % oral gel 15 g, 15 g, Oral, PRN  dextrose 50 % IV solution, 12.5 g, Intravenous, PRN  glucagon (rDNA) injection 1 mg, 1 mg, Intramuscular, PRN  dextrose 5 % solution, 100 mL/hr, Intravenous, PRN  [Held by provider] bumetanide (BUMEX) injection 1 mg, 1 mg, Intravenous, BID  [Held by provider] metoprolol succinate (TOPROL XL) extended release tablet 25 mg, 25 mg, Oral, BID  digoxin (LANOXIN) injection 62.5 mcg, 62.5 mcg, Intravenous, Daily  pantoprazole (PROTONIX) tablet 40 mg, 40 mg, Oral, QAM AC  budesonide (PULMICORT) nebulizer suspension 500 mcg, 0.5 mg, Nebulization, BID  Arformoterol Tartrate (BROVANA) nebulizer solution 15 mcg, 15 mcg, Nebulization, BID  allopurinol (ZYLOPRIM) tablet 100 mg, 100 mg, Oral, BID  aspirin chewable tablet 81 mg, 81 mg, Oral, Daily  ferrous sulfate (IRON 325) tablet 325 mg, 325 mg, Oral, Daily with breakfast  finasteride (PROSCAR) tablet 5 mg, 5 mg, Oral, Daily  [Held by provider] isosorbide mononitrate (IMDUR) extended release tablet 30 mg, 30 mg, Oral, Daily  levothyroxine (SYNTHROID) tablet 125 mcg, 125 mcg, Oral, Daily  pravastatin (PRAVACHOL) tablet 20 mg, 20 mg, Oral, Daily  ipratropium-albuterol (DUONEB) nebulizer solution 1 ampule, 1 ampule, Inhalation, Q4H WA  sodium chloride flush 0.9 % injection 10 mL, 10 mL, Intravenous, 2 times per day  sodium chloride flush 0.9 % injection 10 mL, 10 mL, Intravenous, PRN  magnesium hydroxide (MILK OF MAGNESIA) 400 MG/5ML suspension 30 mL, 30 mL, Oral, Daily PRN  acetaminophen (TYLENOL) tablet 650 mg, 650 mg, Oral, Q6H PRN **OR** acetaminophen (TYLENOL) suppository 650 mg, 650 mg, Rectal, Q6H PRN  perflutren lipid microspheres (DEFINITY) injection 1.65 mg, 1.5 mL, Intravenous, ONCE PRN  Facility-Administered Medications Ordered in Other Encounters: 0.9 % sodium chloride infusion, , , Continuous PRN  Allergies:  Patient has no known allergies.     Social History:    Social History     Socioeconomic History    Marital status: Single     Spouse name: Not on file    Number of children: Not on file    Years of education: Not on file    Highest education level: Not on file   Occupational History    Not on file   Social Needs    Financial resource strain: Not on file    Food insecurity     Worry: Not on file     Inability: Not on file    Transportation needs     Medical: Not on file     Non-medical: Not on file   Tobacco Use    Smoking status: Former Smoker     Packs/day: 2.00     Years: 40.00     Pack years: 80.00     Types: Cigarettes     Quit date: 1/1/2006     Years since quitting: 15.2    Smokeless tobacco: Never Used   Substance and Sexual Activity    Alcohol use: Not Currently    Drug use: Never    Sexual activity: Not on file   Lifestyle    Physical activity     Days per week: Not on file     Minutes per session: Not on file    Stress: Not on file   Relationships    Social connections     Talks on phone: Not on file     Gets together: Not on file     Attends Islam service: Not on file     Active member of club or organization: Not on file     Attends meetings of clubs or organizations: Not on file     Relationship status: Not on file    Intimate partner violence     Fear of current or ex partner: Not on file     Emotionally abused: Not on file     Physically abused: Not on file     Forced sexual activity: Not on file   Other Topics Concern    Not on file   Social History Narrative    Drinks 1-2 [employer] daily & occ coffee.        Family History:       Problem Relation Age of Onset    Cancer Father     Cancer Sister        REVIEW OF SYSTEMS:    Cannot be performed due to intubated and sedated state    PHYSICAL EXAM:      Vitals:    VITALS:  BP (!) 95/58   Pulse 97   Temp 101.9 °F (38.8 °C) (Esophageal)   Resp 25   Ht 6' 1\" (1.854 m)   Wt 247 lb 3.2 oz (112.1 kg)   SpO2 95%   BMI 32.61 kg/m²   24HR INTAKE/OUTPUT:      Intake/Output Summary (Last 24 hours) at 3/25/2021 0947  Last data filed at 3/25/2021 3519  Gross per 24 hour   Intake 1680.24 ml   Output 467 ml   Net 1213.24 ml       Access: Right IJ central line and peripheral lines  Constitutional: Severe respiratory distress, intubated and sedated and on pressors  HEENT: Pupils equal and reactive to light, intubated,  NECK: Neck is supple  Respiratory: No rhonchi or wheezing, currently requiring ventilatory support  Cardiovascular/Edema: Regular rate and rhythm, no murmurs or other heart sounds, pitting edema 2+ in nature from feet to bilateral buttocks  Gastrointestinal: Soft and nontender to the touch  Musculoskeletal: Normal bulk  Neurologic: Pupils equal and reactive to light, Babinski's reflex intact, withdrawal from pain intact  Other: Edematous    DATA:    CBC:   Lab Results   Component Value Date    WBC 11.0 03/25/2021    RBC 4.64 03/25/2021    HGB 14.5 03/25/2021    HCT 44.0 03/25/2021    MCV 94.8 03/25/2021    MCH 31.3 03/25/2021    MCHC 33.0 03/25/2021    RDW 17.8 03/25/2021     03/25/2021    MPV 12.8 03/25/2021     CMP:    Lab Results   Component Value Date     03/25/2021    K 5.5 03/25/2021    K 5.7 03/20/2021    CL 96 03/25/2021    CO2 20 03/25/2021    BUN 72 03/25/2021    CREATININE 2.5 03/25/2021    GFRAA 30 03/25/2021    LABGLOM 30 03/25/2021    GLUCOSE 190 03/25/2021    PROT 5.9 03/23/2021    LABALBU 2.9 03/23/2021    CALCIUM 9.2 03/25/2021    BILITOT 0.9 03/23/2021    ALKPHOS 52 03/23/2021    AST 25 03/23/2021    ALT 25 03/23/2021     Magnesium:    Lab Results   Component Value Date    MG 2.2 03/25/2021     Phosphorus:    Lab Results   Component Value Date    PHOS 3.2 03/25/2021       Radiology Review:          IMPRESSION/RECOMMENDATIONS:      Briefly Ronda Ball is 66 y.o. male with history of nonischemic cardiomyopathy status post ICD (EF 25%), permanent atrial fibrillation, at coagulation with Coumadin, chronic heart failure with reduced ejection fraction (25%), CKD, COPD hypertension, and hypothyroidism, who was admitted on 3/19/2021 dizziness and presyncopal events with hypoxia. Patient was found to have right ventricular failure, left ventricular failure severe pulmonary hypertension, and severe tricuspid regurgitation and was being evaluated for ICD/pacemaker insertion with AV node ablation. Patient was intubated for procedure and developed fevers and hypotension. Nephrology has been consulted for evaluation of patient's continued oliguric state with edema and worsening chronic kidney disease. 1. ANKUR Stage I on CKD IIIa,  likely hemodynamically mediated due HF, cardiorenal syndrome   2.  CKD IIIa, likely cardio-renal syndrome versus nephrosclerosis   3. Hyperkalemia 2/2 reduced GFR  4. Severe Pulmonary Hypertension  5. Right Ventricular Failure   6. HFrEF (EF 25%) s/p ICD with recovery to 35% EF  7. Severe Tricuspid Regurgitation   8. Persistant Atrial Fibrillation with anticoagulation on coumaddin   9. Shock: septic vs cardiogenic     PLAN:    · Start Albumin IV to improve diuresis; if UOP decreases or is not affected with albumin will start diuretics tomorrow to improve third spacing   · Start D5NS at 40 cc/hour for volume and nutritional support   · Monitor renal function  · Monitor K levels   · May require CRRT based on UOP and electrolyte status     Thank you very much Dr. Maribel Allen  for allowing us to participate in the care of [facility].        Electronically signed by Obed Gill DO on 3/25/2021 at 9:47 AM

## 2021-03-25 NOTE — PLAN OF CARE
Problem: Falls - Risk of:  Goal: Will remain free from falls  Description: Will remain free from falls  3/25/2021 0901 by Trevon Murphy  Outcome: Ongoing     Problem: Falls - Risk of:  Goal: Absence of physical injury  Description: Absence of physical injury  3/25/2021 0901 by Trevon Murphy  Outcome: Ongoing     Problem: OXYGENATION/RESPIRATORY FUNCTION  Goal: Patient will maintain patent airway  Outcome: Ongoing     Problem: OXYGENATION/RESPIRATORY FUNCTION  Goal: Patient will achieve/maintain normal respiratory rate/effort  Description: Respiratory rate and effort will be within normal limits for the patient  Outcome: Ongoing     Problem: Skin Integrity:  Goal: Will show no infection signs and symptoms  Description: Will show no infection signs and symptoms  3/25/2021 0901 by Trevon Murphy  Outcome: Ongoing     Problem: Skin Integrity:  Goal: Absence of new skin breakdown  Description: Absence of new skin breakdown  3/25/2021 0901 by Trevon Murphy  Outcome: Ongoing     Problem: Fluid Volume:  Goal: Hemodynamic stability will improve  Description: Hemodynamic stability will improve  Outcome: Ongoing     Problem: Respiratory:  Goal: Respiratory status will improve  Description: Respiratory status will improve  Outcome: Ongoing     Problem: HEMODYNAMIC STATUS  Goal: Patient has stable vital signs and fluid balance  Outcome: Ongoing

## 2021-03-25 NOTE — PROCEDURES
Bronchoscopy Inpatient Procedure Note    Date of Procedure: 3/25/2021    Pre-op Diagnosis: fever, ? Pneumonia     Post-op Diagnosis: same    Bronchoscopist: GENARO APODACA      Anesthesia: fentanyl and versed gtt     Procedure: Flexible fiberoptic bronchoscopy    Estimated Blood Loss: none     Complications: None    Indications and History      (Please see today's progress notes for the latest issues,  physical exam and lab data)    Consent to Procedure  The risks, benefits, complications, treatment options and expected outcomes were discussed with the patient and/or POA  The possibilities of reaction to medication, pulmonary aspiration, perforation of a viscus, bleeding, failure to diagnose a condition and creating a complication requiring transfusion or operation were discussed with the patient who freely signed the consent. Description of Procedure  The patient was intubated on mechanical ventilation and placed on 100% oxygen. [facility] was monitored by the Critical Nursing and Respiratory therapy staff and the standard ICU monitoring devices. Bryce Sirisha and the procedure were verified as Flexible Fiberoptic Bronchoscopy. A Time Out was held and the above information confirmed. The patient was placed in the appropriate position. The patient was sedated using fentanyl intravenously    The bronchoscope was then passed into the trachea via the ET tube. Lidocaine 2% solution 2 ml at a time was applied topically to the rika. After careful inspection of the tracheal, the bronchoscope was sequentially passed into all segments of the left and right endobronchial trees to the second and/or third divisions. Endobronchial findings    Trachea: distal  tracheal stenosis.    Normal mucosa  Rika  Normal mucosa    Right Main Stem Bronchus  Normal mucosa  Right Upper Lobe Bronchi Normal mucosa  Right Middle Lobe Bronchi  Normal mucosa  Right Lower Lobe Bronchi (including the Superior segment)  Normal

## 2021-03-25 NOTE — PLAN OF CARE
Problem: Falls - Risk of:  Goal: Will remain free from falls  Description: Will remain free from falls  3/25/2021 1721 by [facility]  Outcome: Met This Shift     Problem: Falls - Risk of:  Goal: Absence of physical injury  Description: Absence of physical injury  3/25/2021 1721 by Chalo Carlos  Outcome: Met This Shift     Problem: Skin Integrity:  Goal: Absence of new skin breakdown  Description: Absence of new skin breakdown  3/25/2021 1721 by Chalo Carlos  Outcome: Met This Shift     Problem: OXYGENATION/RESPIRATORY FUNCTION  Goal: Patient will maintain patent airway  3/25/2021 1721 by Vallejo Carlos  Outcome: Not Met This Shift     Problem: Skin Integrity:  Goal: Will show no infection signs and symptoms  Description: Will show no infection signs and symptoms  3/25/2021 1721 by [facility] Carlos  Outcome: Not Met This Shift     Problem: Fluid Volume:  Goal: Hemodynamic stability will improve  Description: Hemodynamic stability will improve  3/25/2021 1721 by Vallejo Carlos  Outcome: Not Met This Shift     Problem: Fluid Volume:  Goal: Ability to maintain a balanced intake and output will improve  Description: Ability to maintain a balanced intake and output will improve  Outcome: Not Met This Shift     Problem: HEMODYNAMIC STATUS  Goal: Patient has stable vital signs and fluid balance  3/25/2021 1721 by Vallejo Carlos  Outcome: Not Met This Shift

## 2021-03-25 NOTE — PROGRESS NOTES
Pharmacy Consultation Note  (Anticoagulant Dosing and Monitoring)    Initial consult date: 3/21/21  Consulting physician: Dr Radha Reynaga    Allergies:  Patient has no known allergies. 66 y.o. male      Ht Readings from Last 1 Encounters:   03/24/21 6' 1\" (1.854 m)     Wt Readings from Last 1 Encounters:   03/25/21 247 lb 3.2 oz (112.1 kg)         Warfarin Indication Target   INR [place] Dose  (if applicable) Diet/Feeding Tube   Afib 2 - 3 2 mg five times weekly  3 mg two times weekly NPO     Vitamin K or Blood product  Administration Date     Phytonadione 5 mg x1 3/23         Warfarin drug-drug interactions  Start  Stop Home Med? Comments    Allopurinol 100 mg PO BID 3/20  Yes    ASA 81 mg PO daily 3/19  Yes    Levothyroxine 125 mcg PO daily 3/19  Yes            Hepatic Function Panel:                            Lab Results   Component Value Date    ALKPHOS 52 03/23/2021    ALT 25 03/23/2021    AST 25 03/23/2021    PROT 5.9 03/23/2021    BILITOT 0.9 03/23/2021    BILIDIR 0.5 03/23/2021    IBILI 0.4 03/23/2021    LABALBU 2.9 03/23/2021       Date Warfarin Dose INR Heparin or LMWH HBG/HCT PLT Comment   3/20 2 mg (0018)  2 mg (1606) 2.6 -- 14.6/46.7 153    3/21 Hold dose 2.9 -- 14.3/45.1 136 Pharmacy consulted   3/22 Hold 4.4 -- -- --    3/23 Hold 7.1 -- 14.5/45 133 Phytonadione 5 mg x1 for procedure tomorrow   3/24 Hold 2.1 -- 14.4/45.8 144    3/25 Hold 2 -- 14.2/43.2 142                        Assessment:  · 65 yo M admitted with SOB and acute decompensated HF  · Pt on chronic anticoagulation with Warfarin for Afib - home regimen is Warfarin 3 mg on Tu/Th and 2 mg the rest of the week   · Pharmacy consulted on 3/21 per Dr Radha Reynaga to dose/manage Warfarin  · INR goal 2 - 3    Plan:  · Hold warfarin tonight  · Daily PT/INR until the INR is stable within the therapeutic range. Will continue to follow. Thank you for the consult.   Marie Katz PharmD, BCPS 3/25/2021 2:38 PM

## 2021-03-25 NOTE — PROGRESS NOTES
consistent with pulmonary artery hypertension.  Additionally, there is severe   cardiomegaly with a small pericardial effusion and small, partially loculated   bilateral pleural effusions (right greater than left).     Moderate centrilobular emphysema.       2-3 mm subpleural pulmonary nodule in the right lower lobe.  If the patient   is at high risk for development of lung carcinoma, a follow-up CT of the   chest could be considered in 1 year to document stability.       Mildly enlarged mediastinal lymph nodes, of uncertain etiology or clinical   significance. Resident's Assessment and Plan     [facility]   , 66 y.o. , male came with CC : Hypoxia and hypotension     has a past medical history of Arrhythmia, Atrial fibrillation ([address] CHF (congestive heart failure) ([address] CKD (chronic kidney disease), Hyperlipidemia, Hypertension, Nonischemic cardiomyopathy ([address] and Thyroid disease. SUMMARY OF HOSPITAL STAY: Briefly,  [facility] with past medical history of nonischemic cardiomyopathy status post ICD in 2011, permanent atrial fibrillation on warfarin, heart failure with reduced ejection fraction EF 25% (2018): Chronic kidney disease stage IIIb, COPD on 3 L nasal cannula at home, benign prostatic hyperplasia, hypertension, hyperlipidemia, hypothyroidism, morbid obesity. Initially presented to the ED on 3/19/2021 chief complaint of dyspnea on exertion, and hypoxia. Initial assessment vital signs are stable, satting 99% on high flow nasal cannula. on examination he was fluid overload. proBNP were 12,394. Chest x-ray noted for pulmonary congestion. rest of the labs were unremarkable. He received 40 mg Lasix and aspirin and was admitted to the telemetry floor. On the floor he was unable to wean off high flow nasal cannula required BiPAP. He also had episodes of hypotension and V. Tach. For further care and management he was transferred to the medical ICU.   (March 21)    Days since oliguric and Hyperkalemic   Keep input and output record  Creatinine is 2.2, this may be his new baseline  Nephrology consulted, appreciate recommendation      Gross hematuria due to malposition Cohen catheter  Urology consulted, appreciate recommendations  Monitor urine output   Resolved     History of elevated PSA 27, follows with Dr. Layla Amaral  Continue finasteride 5 mg    Hyperkalemia  Patient potassium is consistently above 5.5. Status post calcium gluconate and insulin  Repeat BMP  5 units of insulin and D5  Monitor electrolytes  Nephrology following     History of hyperlipidemia and hypothyroidism  Continue pravastatin and Synthroid 125 mcg    Next of Kin/ POA:   Maricruz Bowman  Sister  [phone]    Code Status:   Full code    PT/OT: Will consult once patient is stable  DVT ppx: PCD  GI ppx: Protonix  Disposition: Continue ICU care   Nutrition: N.p.o. for EP procedures and heart cath. Avoid hypoglycemia    Estella Gould MD, PGY-1  Internal Medicine   Attending physician: Dr. Omaira Small     I personally saw, examined and provided care for the patient. Radiographs, labs and medication list were reviewed by me independently. I spoke with bedside nursing, therapists and consultants. Critical care services and times documented are independent of procedures and multidisciplinary rounds with Residents. Additionally comprehensive, multidisciplinary rounds were conducted with the MICU team. The case was discussed in detail and plans for care were established. Review of Residents documentation was conducted and revisions were made as appropriate. I agree with the above documented exam, problem list and plan of care. Bronchoscopy today for cultures   Adjust vent   Abx   Manan Jensen M.D.    Pulmonary/Critical Care Medicine   40 min cct excluding procedures

## 2021-03-25 NOTE — PROGRESS NOTES
03/23/2021   CARDIAC ELECTROPHYSIOLOGY CONSULTATION    PATIENT: [facility] RECORD NUMBER: [id number]  DATE OF SERVICE:  3/25/2021  CONSULTING ELECTROPHYSIOLOGIST: Brian Varela  PHYSICIAN REQUESTING CONSULTATION: Osbaldo Matos  REASON FOR CONSULTATION: Atrial fibrillation     PRESENTATION  3/19 via ED c/o sudden onset dyspnea. SYNOPSIS OF RELEVANT MEDICAL ISSUES  1. Poor metabolic health incurring multiple co-morbid conditions. 2. Advanced atherosclerosis, thus far subclinical.  3. Parenchymal pulmonary disease (includes emphysema): advanced. Current respiratory failure requiring BIPAP support. 4. Heart failure with reduced ejection fraction: etiology non-ischemic. Presently decompensated. 5. Atrial fibrillation: persistent. Long-standing. Rapid ventricular response, resistant to attempts at medicinal control. Prior amiodarone discontinued, possibly due to pulmonary toxicity. Likely contributing significantly to current decompensated condition. 6. Ventricular tachycardia: non-sustained. 7. Normal intraventricular conduction. 8. Implanted cardioverter-defibrillator system (ICD): normal interim function (see below). Pulse generator is at end of life. 9. Echocardiogram 3/19/21:  a. Left ventricular ejection fraction moderately diminished  b. Right ventricle severely dilated, and with severely reduced ejection fraction  c. Severe tricuspid regurgitation  d. Severe pulmonary arterial hypertension  e. Hemodynamically insignificant pericardial effusion  10. CHADS-VASC > 2: compliant with and tolerant of warfarin and \"low dose\" aspirin. INR 7.1                 THOUGHTS  1. Despite low yield given circumstances (chronic anticoagulation, low DD), an evaluation for recent pulmonary embolism may be prudent. 2.   There is no good option here for restoring sinus rhythm. 3.    Ventricular rate and regularity control would likely be helpful.  Given that this is not achievable with tolerated medicines, would recommend AV node ablation. 4.    Given echocardiogram (in particular right heart) and normal intraventricular conduction, I am concerned about the repercussions of rendering him ventricular pacing-dependent. Nonetheless, I think it is warranted given the circumstances. 5.     In that his ICD pulse generator requires replacement now, will revise his system to a rsynchronization system in tandem. RECOMMENDATIONS              1.    Consider evaluation for pulmonary arterial thrombosis. 2.    Perform catheter ablation of AV node and revision of ICD system to resynchronization system. Will schedule for tomorrow if INR is rendered appropriate. Ngozi Zamora MD, [facility]  Cardiac Electrophysiology  [phone]    3/23/21   10:56 AM EDT    I spent 90 minutes with this patient, >50% of which was dedicated to counseling/coordination of care pertaining to the above. CARDIAC IMPLANTABLE ELECTRICAL SYSTEM INTERROGATION    Pulse generator : [employer]    Pulse generator type: E110    Pulse generator battery: At end of life    Programmed bradycardia pacing mode: DDDR 70/120     Presenting rhythm: Atrial fibrillation with rapid ventricular response. Underlying rhythm: Same     Interim pacing (%):   - Atrium: 0  - Ventricle: 0    Lead function:   - Atrium: wnl   - Ventricle: wnl    Interim tachycardia events/burden:   - Atrium: 100% fibrillation    - Ventricle: several detections +/- ATP, all for AF-induced RVR    Programming changes:  DDDR to VVI 70  Two tachycardia zones to single zone (220/min)      03/24/2021 Bryce Grimm is seen in hospital follow-up. He is scheduled for AV node ablation and upgrade of his dual chamber ICD to a CRT-D device. This am he is sitting up in bed on Bi-PAP and dyspnic. Discussed with nursing. Unable to remove Bi-PAP due to clinical/respiratory status. He is unable to lie flat.  INR today 2.1.      03/25/2021 Bryce Grimm is seen in hospital follow-up. His EP procedures were cancelled yesterday due to his declining clinical status. This am his temp remains 103; he is on cooling blanket. Now on IV pressor support. Central line inserted. He remains intubated and sedated. ID consulted. Blood cultures pending. INR 2.0. He remains in AF with HRs  bpm.    Review of Systems  Unable to obtain due to clinical status    PHYSICAL EXAM:  Vitals:    03/25/21 0907 03/25/21 1000 03/25/21 1100 03/25/21 1137   BP:       Pulse: 97 99 108    Resp: 25 23 25    Temp:    101.2 °F (38.4 °C)   TempSrc:       SpO2: 95% (!) 81% 94%    Weight:       Height:         Constitutional: deferred  Well-developed and cooperative. Head: Normocephalic and atraumatic. Eyes: Conjunctivae are normal.  Cardiovascular: An irregular tachycardic rhythm present. PMI is not displaced. Pulmonary/Chest: intubated  Abdominal: Soft. .   Neurological: Deferred, Alert and oriented to person      Plan:   1. No plan for EP procedures at this time. Will re-evaluate when condition stabilizes. 2. ID consult pending. 3. Will follow with you. 4. Continue care per ICU team/Consultants    Thank you for allowing me to participate in their care. I have spent a total of 30 minutes reviewing the above stated recommendations.  And a total of >50% of that time involved face-to-face time providing counseling and or coordination of care with the other providers    Max Tsai, RIVERA-CNP, AGCNS - discussed with Dr [facility]

## 2021-03-25 NOTE — CONSULTS
[facility] CONSULT NOTE    Reason for Consult: Fever   Requested by: Dr. Aris Pollock    Chief complaint: Shortness of breath     History Obtained From: EMR    HISTORY OFPRESENT ILLNESS              The patient is a 66 y.o. male with history of COPD, atrial fibrillation, ICD in situ, CHF, CKD, hypertension, hyperlipidemia, BPH, presented on 0 with shortness of breath3/19. On admission, he was afebrile and hemodynamically stable with no leukocytosis. CT chest showed pulmonary nodule, dilated main pulmonary artery consistent with pulmonary artery hypertension, severe cardiomegaly with small pericardial effusion and small partially loculated bilateral pleural effusions worse on the right, moderate centrilobular emphysema. He was managed supportively with diuresis then developed fever up to 103.2 °F on 03/25. Procalcitonin level was elevated at 2.26 ng/mL. Respiratory pathogen PCR panel, urine Streptococcus pneumoniae and Legionella antigens were negative. Urinalysis showed insignificant pyuria of 1-3 WBCs. Blood cultures showed no growth to date. Ceftriaxone was started on 03/21 then switched to piperacillin-tazobactam on 03/25. Vancomycin was started on 03/25. He underwent bronchoscopy on 03/25 during which scattered mucous plugs in the right lower lobe bronchi was noted. ID service was subsequently consulted for further recommendations.     Past Medical History  Past Medical History:   Diagnosis Date    Arrhythmia     Atrial fibrillation ([address]     CHF (congestive heart failure) (HCC)     CKD (chronic kidney disease)     Hyperlipidemia     Hypertension     Nonischemic cardiomyopathy (HCC)     Thyroid disease        Current Facility-Administered Medications   Medication Dose Route Frequency Provider Last Rate Last Admin    piperacillin-tazobactam (ZOSYN) 3,375 mg in dextrose 5 % 100 mL IVPB extended infusion (mini-bag)  3,375 mg Intravenous Fifi Nieto MD 25 mL/hr at 03/25/21 1643 3,375 mg at  glucose (GLUTOSE) 40 % oral gel 15 g  15 g Oral PRN Cristofer Llanos MD        dextrose 50 % IV solution  12.5 g Intravenous PRN Cristofer Llanos MD        glucagon (rDNA) injection 1 mg  1 mg Intramuscular PRN Cristofer Llanos MD        dextrose 5 % solution  100 mL/hr Intravenous PRN Cristofer Llanos MD        [Held by provider] bumetanide (BUMEX) injection 1 mg  1 mg Intravenous BID Johana Alvarez MD   1 mg at 03/24/21 0747    [Held by provider] metoprolol succinate (TOPROL XL) extended release tablet 25 mg  25 mg Oral BID Bertha Erwin MD   25 mg at 03/24/21 0907    [Held by provider] digoxin (LANOXIN) injection 62.5 mcg  62.5 mcg Intravenous Daily Danae Ingram MD   62.5 mcg at 03/25/21 7732    pantoprazole (PROTONIX) tablet 40 mg  40 mg Oral QAM AC Randolph Lynch MD   40 mg at 03/25/21 0601    budesonide (PULMICORT) nebulizer suspension 500 mcg  0.5 mg Nebulization BID Johana Alvarez MD   500 mcg at 03/25/21 8478    Arformoterol Tartrate (BROVANA) nebulizer solution 15 mcg  15 mcg Nebulization BID Jabari Kong, DO   15 mcg at 03/25/21 6116    allopurinol (ZYLOPRIM) tablet 100 mg  100 mg Oral BID Yong Pandya MD   100 mg at 03/25/21 1720    aspirin chewable tablet 81 mg  81 mg Oral Daily Yong Pandya MD   81 mg at 03/25/21 3118    ferrous sulfate (IRON 325) tablet 325 mg  325 mg Oral Daily with breakfast Yong Pandya MD   325 mg at 03/25/21 2605    finasteride (PROSCAR) tablet 5 mg  5 mg Oral Daily Yong Pandya MD   5 mg at 03/25/21 0321    [Held by provider] isosorbide mononitrate (IMDUR) extended release tablet 30 mg  30 mg Oral Daily Ariella Walker MD   Stopped at 03/22/21 1200    levothyroxine (SYNTHROID) tablet 125 mcg  125 mcg Oral Daily Yong Pandya MD   125 mcg at 03/25/21 4950    pravastatin (PRAVACHOL) tablet 20 mg  20 mg Oral Daily Yong Pandya MD   20 mg at 03/25/21 0811    ipratropium-albuterol (DUONEB) nebulizer solution 1 ampule  1 ampule Inhalation Q4H WA Shravan Samaniego MD   1 ampule at 03/25/21 1453    sodium chloride flush 0.9 % injection 10 mL  10 mL Intravenous 2 times per day Shravan Samaniego MD   10 mL at 03/25/21 0848    sodium chloride flush 0.9 % injection 10 mL  10 mL Intravenous PRN Shravan Samaniego MD   10 mL at 03/23/21 1600    magnesium hydroxide (MILK OF MAGNESIA) 400 MG/5ML suspension 30 mL  30 mL Oral Daily PRN Shravan Samaniego MD        acetaminophen (TYLENOL) tablet 650 mg  650 mg Oral Q6H PRN Shravan Samaniego MD   650 mg at 03/25/21 1211    Or    acetaminophen (TYLENOL) suppository 650 mg  650 mg Rectal Q6H PRN Shravan Samaniego MD        perflutren lipid microspheres (DEFINITY) injection 1.65 mg  1.5 mL Intravenous ONCE PRN Gina Diamond MD           No Known Allergies    Surgical History  Past Surgical History:   Procedure Laterality Date    CARDIAC DEFIBRILLATOR PLACEMENT  6/21/11    HERNIA REPAIR          Social History  Social History     Socioeconomic History    Marital status: Single   Tobacco Use    Smoking status: Former Smoker     Packs/day: 2.00     Years: 40.00     Pack years: 80.00     Types: Cigarettes     Quit date: 1/1/2006     Years since quitting: 15.2    Smokeless tobacco: Never Used   Substance and Sexual Activity    Alcohol use: Not Currently    Drug use: Never   Social History Narrative    Drinks 1-2 [employer] daily & occ coffee.        Family Medical History  Family History   Problem Relation Age of Onset    Cancer Father     Cancer Sister        Review of Systems:  Unable to obtain due to patient intubated and sedated    Physical Examination:  Vitals:    03/25/21 1500 03/25/21 1538 03/25/21 1600 03/25/21 1700   BP:       Pulse: 101 110 119 113   Resp: 25 25 25 25   Temp:   100.6 °F (38.1 °C)    TempSrc:   Esophageal    SpO2: 92% 91% (!) 81% 95%   Weight:       Height:         Constitutional: Intubated, no MV dyssynchrony  Eyes: Sclerae anicteric, no conjunctival erythema  ENT: No buccal lesion, endotracheal tube in place  Neck: No nuchal rigidity, no cervical adenopathy  Lungs: Clear breath sounds, no crackles, no wheezes  Heart: Regular rate and rhythm, no murmurs. ICD in situ  Abdomen: Bowel sounds present, soft, nontender  Skin: Warm and dry, no active dermatoses. Musculoskeletal: No joint erythema, no joint swelling    Labs, imaging, and medical records/notes were personally reviewed. Assessment:  Shock, cardiogenic versus septic, suspect associated with HAP  CKD    Plan:  Continue piperacillin-tazobactam 3.375 g every 8 hours and vancomycin dosed according to level per Pharmacy. Follow-up blood and bronchial wash cultures. Check MRSA nares culture. Continue supportive care. Thank you for involving me in the care of [facility]. I will continue to follow. Please do not hesitate to call for any questions or concerns.     Electronically signed by Harley Jansen MD on 3/25/2021 at 5:20 PM

## 2021-03-25 NOTE — CARE COORDINATION
Transition of Care-Patient was scheduled to go to cath lab today, however was intubated last night, developed a fever (102), now requiring 100% FIO2 with PEEP 14, resumed levophed and vasopressin,. Discharge plan was home with family support, however unsure this is realistic, will reach out to both LTACH's to check for criteria and discuss with family pending clinical outcomes. CM following.     Miesha President BSN, RN  [facility]   [phone]

## 2021-03-25 NOTE — PROGRESS NOTES
Pharmacy Consultation Note  (Antibiotic Dosing and Monitoring)    Initial consult date: 3/25/21  Consulting physician: Dr. Christina Díaz  Drug: Vancomycin  Indication: Sepsis    Age/  Gender Height Weight IBW Dosing weight  Allergy Information   75 y.o./male 6' 1\" 112.7 kg   Ideal body weight: 79.9 kg (176 lb 2.4 oz)  Adjusted ideal body weight: 92.8 kg (204 lb 9.1 oz)  93 kg  Patient has no known allergies. Date  WBC BUN SCr CrCl  (mL/min) Drug/Dose Time   Given Level(s)   (Time) Comments   3/25 12.7 71 2.6  31   Vancomycin 2250 mg IV x1  0156     3/26 - - - - Vancomycin 1750 mg IV q24h (1200)                                 Intake/Output Summary (Last 24 hours) at 3/25/2021 1438  Last data filed at 3/25/2021 1400  Gross per 24 hour   Intake 1555.24 ml   Output 688 ml   Net 867.24 ml       Historical Cultures:  No results found for: ORG  No results for input(s): BC in the last 72 hours. Cultures:  available culture and sensitivity results were reviewed in [facility]    Assessment:  · 66 y.o. male presently intubated and sedated 2/2 acute hypoxic respiratory initiated on empiric antibiotics for septic shock as a result of possible aspiration pneumonia. Pharmacy has been consulted to dose/monitor vancomycin.    · Goal trough level = 15-20 mcg/mL      Plan:  · Vancomycin 1750 mg IV q36h  · Monitor renal function   · Clinical pharmacy to follow    Oskar EliD, BCPS 3/25/2021 2:39 PM

## 2021-03-25 NOTE — PLAN OF CARE
Problem: Non-Violent Restraints  Goal: No harm/injury to patient while restraints in use  3/24/2021 2155 by Amanda Lundy  Outcome: Met This Shift     Problem: Non-Violent Restraints  Goal: Patient's dignity will be maintained  3/24/2021 2155 by Amanda Lundy  Outcome: Met This Shift     Problem: Non-Violent Restraints  Goal: Removal from restraints as soon as assessed to be safe  3/24/2021 2155 by Amanda Lundy  Outcome: Not Met This Shift

## 2021-03-25 NOTE — PROGRESS NOTES
Chief Complaint:  Chief Complaint   Patient presents with    Shortness of Breath     usually wears 3-4L. Arrived on NRB @ 99% SPo2     Acute on chronic systolic congestive heart failure (HCC)     Subjective:    He ended up getting intubated overnight. Started spiking high fevers. Biventricular upgrade and RHC both are canceled    Objective:    BP (!) 95/58   Pulse 105   Temp 101.2 °F (38.4 °C) (Esophageal)   Resp 25   Ht 6' 1\" (1.854 m)   Wt 247 lb 3.2 oz (112.1 kg)   SpO2 92%   BMI 32.61 kg/m²     Current medications that patient is taking have been reviewed. General appearance: Ill-appearing male on the ventilator  HEENT: AT/NC, ETT in place  Neck: FROM, supple  Lungs: Mild basilar rales  CV: RRR, no MRGs  Abdomen: Soft, non-tender; no masses or HSM, +BS  Extremities: No peripheral edema or digital cyanosis  Skin: no rash, lesions or ulcers  Psych: N/A  Neuro:  He was not following commands for me    Labs:  CBC with Differential:    Lab Results   Component Value Date    WBC 10.3 03/25/2021    RBC 4.64 03/25/2021    HGB 14.2 03/25/2021    HCT 43.2 03/25/2021     03/25/2021    MCV 93.1 03/25/2021    MCH 30.6 03/25/2021    MCHC 32.9 03/25/2021    RDW 17.6 03/25/2021    NRBC 6.1 03/24/2021    LYMPHOPCT 6.8 03/25/2021    MONOPCT 13.0 03/25/2021    BASOPCT 0.2 03/25/2021    MONOSABS 1.43 03/25/2021    LYMPHSABS 0.75 03/25/2021    EOSABS 0.02 03/25/2021    BASOSABS 0.02 03/25/2021     CMP:    Lab Results   Component Value Date     03/25/2021    K 5.2 03/25/2021    K 5.7 03/20/2021    CL 98 03/25/2021    CO2 22 03/25/2021    BUN 71 03/25/2021    CREATININE 2.4 03/25/2021    GFRAA 32 03/25/2021    LABGLOM 32 03/25/2021    GLUCOSE 151 03/25/2021    PROT 5.9 03/23/2021    LABALBU 2.9 03/23/2021    CALCIUM 9.4 03/25/2021    BILITOT 0.9 03/23/2021    ALKPHOS 52 03/23/2021    AST 25 03/23/2021    ALT 25 03/23/2021          Assessment/Plan:  Principal Problem:    Acute on chronic systolic congestive

## 2021-03-26 PROBLEM — R74.01 TRANSAMINITIS: Status: ACTIVE | Noted: 2021-01-01

## 2021-03-26 NOTE — PROGRESS NOTES
Pharmacy Consultation Note  (Antibiotic Dosing and Monitoring)    Initial consult date: 3/25/21  Consulting physician: Dr. Fabiano Mccormack  Drug: Vancomycin  Indication: Sepsis    Age/  Gender Height Weight IBW Dosing weight  Allergy Information   75 y.o./male 6' 1\" 112.7 kg   Ideal body weight: 79.9 kg (176 lb 2.4 oz)  Adjusted ideal body weight: 95.2 kg (209 lb 12.3 oz)  93 kg  Patient has no known allergies. Date  WBC SCr CrCl  (mL/min) Drug/Dose Time   Given Level(s)   (Time) Comments   3/25 12.7 2.6  31   Vancomycin 2250 mg IV x1  0156     3/26 10.8 2.6 32 Vancomycin 1750 mg IV q36h (1200)                               Intake/Output Summary (Last 24 hours) at 3/26/2021 0751  Last data filed at 3/26/2021 0600  Gross per 24 hour   Intake 2657.88 ml   Output 752 ml   Net 1905.88 ml       Historical Cultures:  No results found for: [facility]  Recent Labs     03/24/21  2049   BC 24 Hours no growth       Cultures:  available culture and sensitivity results were reviewed in [facility]    Assessment:  · 66 y.o. male presently intubated and sedated 2/2 acute hypoxic respiratory initiated on empiric antibiotics for septic shock as a result of possible aspiration pneumonia. Pharmacy has been consulted to dose/monitor vancomycin.    · Goal trough level = 15-20 mcg/mL      Plan:  · Vancomycin 1750 mg IV q36h  · Monitor renal function   · Clinical pharmacy to follow    Tadeo Sesay PharmD, BCPS 3/26/2021 7:51 AM

## 2021-03-26 NOTE — PROGRESS NOTES
Department of Internal Medicine  Nephrology Progress Note      Events reviewed. SUBJECTIVE:  We are following [address] for oliguria and CKD. Patient continues to be intubated and sedated on 2 pressors for blood pressure support.  cc over last 24 hours.      Scheduled Meds:   piperacillin-tazobactam  3,375 mg Intravenous Q8H    heparin (porcine)  60 Units/kg Intravenous Once    hydrocortisone sodium succinate PF  100 mg Intravenous Q8H    albumin human  25 g Intravenous Q8H    vancomycin  1,750 mg Intravenous Q36H    sodium chloride flush  10 mL Intravenous 2 times per day    chlorhexidine  15 mL Mouth/Throat BID    midazolam  4 mg Intravenous Once    [Held by provider] bumetanide  1 mg Intravenous BID    [Held by provider] metoprolol succinate  25 mg Oral BID    [Held by provider] digoxin  62.5 mcg Intravenous Daily    pantoprazole  40 mg Oral QAM AC    budesonide  0.5 mg Nebulization BID    Arformoterol Tartrate  15 mcg Nebulization BID    [Held by provider] allopurinol  100 mg Oral BID    aspirin  81 mg Oral Daily    ferrous sulfate  325 mg Oral Daily with breakfast    finasteride  5 mg Oral Daily    [Held by provider] isosorbide mononitrate  30 mg Oral Daily    levothyroxine  125 mcg Oral Daily    [Held by provider] pravastatin  20 mg Oral Daily    ipratropium-albuterol  1 ampule Inhalation Q4H WA    sodium chloride flush  10 mL Intravenous 2 times per day     Continuous Infusions:   [Held by provider] heparin (PORCINE) Infusion Stopped (03/25/21 1142)    dextrose 5 % and 0.9 % NaCl 40 mL/hr at 03/25/21 1323    norepinephrine 11 mcg/min (03/26/21 1309)    fentaNYL 5 mcg/ml in 0.9%  ml infusion 75 mcg/hr (03/26/21 0438)    vasopressin (Septic Shock) infusion 0.03 Units/min (03/26/21 0625)    dextrose       PRN Meds:.heparin (porcine), heparin (porcine), sodium chloride flush, glucose, dextrose, glucagon (rDNA), dextrose, sodium chloride flush, magnesium hydroxide, acetaminophen **OR** acetaminophen, perflutren lipid microspheres      Physical Exam:    VITALS:  BP 96/62   Pulse 108   Temp 100.5 °F (38.1 °C) (Esophageal)   Resp 25   Ht 6' 1\" (1.854 m)   Wt 260 lb 3.2 oz (118 kg)   SpO2 100%   BMI 34.33 kg/m²   24HR INTAKE/OUTPUT:      Intake/Output Summary (Last 24 hours) at 3/26/2021 1317  Last data filed at 3/26/2021 1200  Gross per 24 hour   Intake 2657.88 ml   Output 639 ml   Net 2018.88 ml       Access: Right IJ central line and peripheral lines  Constitutional: Severe respiratory distress, intubated and sedated and on pressors  HEENT: Pupils equal and reactive to light, intubated,  NECK: Neck is supple  Respiratory: No rhonchi or wheezing, currently requiring ventilatory support  Cardiovascular/Edema: Regular rate and rhythm, no murmurs or other heart sounds, pitting edema 2+ in nature from feet to bilateral buttocks  Gastrointestinal: Soft and nontender to the touch  Musculoskeletal: Normal bulk  Neurologic: Pupils equal and reactive to light, Babinski's reflex intact, withdrawal from pain intact  Other: Edematous - stable     DATA:    CBC:   Lab Results   Component Value Date    WBC 10.8 03/26/2021    RBC 4.51 03/26/2021    HGB 13.8 03/26/2021    HCT 42.3 03/26/2021    MCV 93.8 03/26/2021    MCH 30.6 03/26/2021    MCHC 32.6 03/26/2021    RDW 17.6 03/26/2021     03/26/2021    MPV 12.6 03/26/2021     CMP:    Lab Results   Component Value Date     03/26/2021    K 5.3 03/26/2021    K 5.7 03/20/2021    CL 98 03/26/2021    CO2 24 03/26/2021    BUN 77 03/26/2021    CREATININE 2.6 03/26/2021    GFRAA 29 03/26/2021    LABGLOM 29 03/26/2021    GLUCOSE 185 03/26/2021    PROT 5.9 03/26/2021    LABALBU 3.0 03/26/2021    CALCIUM 9.1 03/26/2021    BILITOT 4.8 03/26/2021    ALKPHOS 42 03/26/2021     03/26/2021     03/26/2021     Magnesium:    Lab Results   Component Value Date    MG 2.3 03/26/2021     Phosphorus:    Lab Results   Component

## 2021-03-26 NOTE — PROGRESS NOTES
03/23/2021   CARDIAC ELECTROPHYSIOLOGY CONSULTATION    PATIENT: [facility] RECORD NUMBER: [id number]  DATE OF SERVICE:  3/26/2021  CONSULTING ELECTROPHYSIOLOGIST: Fady Sahni  PHYSICIAN REQUESTING CONSULTATION: Osbaldo Matos  REASON FOR CONSULTATION: Atrial fibrillation     PRESENTATION  3/19 via ED c/o sudden onset dyspnea. SYNOPSIS OF RELEVANT MEDICAL ISSUES  1. Poor metabolic health incurring multiple co-morbid conditions. 2. Advanced atherosclerosis, thus far subclinical.  3. Parenchymal pulmonary disease (includes emphysema): advanced. Current respiratory failure requiring BIPAP support. 4. Heart failure with reduced ejection fraction: etiology non-ischemic. Presently decompensated. 5. Atrial fibrillation: persistent. Long-standing. Rapid ventricular response, resistant to attempts at medicinal control. Prior amiodarone discontinued, possibly due to pulmonary toxicity. Likely contributing significantly to current decompensated condition. 6. Ventricular tachycardia: non-sustained. 7. Normal intraventricular conduction. 8. Implanted cardioverter-defibrillator system (ICD): normal interim function (see below). Pulse generator is at end of life. 9. Echocardiogram 3/19/21:  a. Left ventricular ejection fraction moderately diminished  b. Right ventricle severely dilated, and with severely reduced ejection fraction  c. Severe tricuspid regurgitation  d. Severe pulmonary arterial hypertension  e. Hemodynamically insignificant pericardial effusion  10. CHADS-VASC > 2: compliant with and tolerant of warfarin and \"low dose\" aspirin. INR 7.1                 THOUGHTS  1. Despite low yield given circumstances (chronic anticoagulation, low DD), an evaluation for recent pulmonary embolism may be prudent. 2.   There is no good option here for restoring sinus rhythm. 3.    Ventricular rate and regularity control would likely be helpful.  Given that this is not achievable with tolerated medicines, would recommend AV node ablation. 4.    Given echocardiogram (in particular right heart) and normal intraventricular conduction, I am concerned about the repercussions of rendering him ventricular pacing-dependent. Nonetheless, I think it is warranted given the circumstances. 5.     In that his ICD pulse generator requires replacement now, will revise his system to a rsynchronization system in tandem. RECOMMENDATIONS              1.    Consider evaluation for pulmonary arterial thrombosis. 2.    Perform catheter ablation of AV node and revision of ICD system to resynchronization system. Will schedule for tomorrow if INR is rendered appropriate. Jenna Godoy MD, [facility]  Cardiac Electrophysiology  [phone]    3/23/21   10:56 AM EDT    I spent 90 minutes with this patient, >50% of which was dedicated to counseling/coordination of care pertaining to the above. CARDIAC IMPLANTABLE ELECTRICAL SYSTEM INTERROGATION    Pulse generator : [employer]    Pulse generator type: E110    Pulse generator battery: At end of life    Programmed bradycardia pacing mode: DDDR 70/120     Presenting rhythm: Atrial fibrillation with rapid ventricular response. Underlying rhythm: Same     Interim pacing (%):   - Atrium: 0  - Ventricle: 0    Lead function:   - Atrium: wnl   - Ventricle: wnl    Interim tachycardia events/burden:   - Atrium: 100% fibrillation    - Ventricle: several detections +/- ATP, all for AF-induced RVR    Programming changes:  DDDR to VVI 70  Two tachycardia zones to single zone (220/min)      03/24/2021 Bryce Grimm is seen in hospital follow-up. He is scheduled for AV node ablation and upgrade of his dual chamber ICD to a CRT-D device. This am he is sitting up in bed on Bi-PAP and dyspnic. Discussed with nursing. Unable to remove Bi-PAP due to clinical/respiratory status. He is unable to lie flat.  INR today 2.1.      03/25/2021 Bryce Grimm is seen in

## 2021-03-26 NOTE — PROGRESS NOTES
Pharmacy Consultation Note  (Anticoagulant Dosing and Monitoring)    Initial consult date: 3/21/21  Consulting physician: Dr Jose Phan    Allergies:  Patient has no known allergies. 66 y.o. male      Ht Readings from Last 1 Encounters:   03/24/21 6' 1\" (1.854 m)     Wt Readings from Last 1 Encounters:   03/26/21 260 lb 3.2 oz (118 kg)         Warfarin Indication Target   INR Range Home Dose  (if applicable) Diet/Feeding Tube   Afib 2 - 3 2 mg five times weekly  3 mg two times weekly NPO     Vitamin K or Blood product  Administration Date     Phytonadione 5 mg x1 3/23         Warfarin drug-drug interactions  Start  Stop Home Med? Comments    Allopurinol 100 mg PO BID 3/20  Yes    ASA 81 mg PO daily 3/19  Yes    Levothyroxine 125 mcg PO daily 3/19  Yes            Hepatic Function Panel:                            Lab Results   Component Value Date    ALKPHOS 42 03/26/2021     03/26/2021     03/26/2021    PROT 5.9 03/26/2021    BILITOT 4.8 03/26/2021    BILIDIR 0.5 03/23/2021    IBILI 0.4 03/23/2021    LABALBU 3.0 03/26/2021       Date Warfarin Dose INR Heparin or LMWH HBG/HCT PLT Comment   3/20 2 mg (0018)  2 mg (1606) 2.6 -- 14.6/46.7 153    3/21 Hold dose 2.9 -- 14.3/45.1 136 Pharmacy consulted   3/22 Hold 4.4 -- -- --    3/23 Hold 7.1 -- 14.5/45 133 Phytonadione 5 mg x1 for procedure tomorrow   3/24 Hold 2.1 -- 14.4/45.8 144    3/25 Hold 2 -- 14.2/43.2 142    2/26 Hold 1.9 -- 13.8/42.3 111               Assessment:  · 65 yo M admitted with SOB and acute decompensated HF  · Pt on chronic anticoagulation with Warfarin for Afib - home regimen is Warfarin 3 mg on Tu/Th and 2 mg the rest of the week   · Pharmacy consulted on 3/21 per Dr Jose Phan to dose/manage Warfarin  · INR goal 2 - 3    Plan:  · Hold warfarin tonight 2/2 thoracentesis planned  · Daily PT/INR until the INR is stable within the therapeutic range. Will continue to follow. Thank you for the consult.   Guera Black, PharmD, BCPS 3/26/2021 7:54 AM

## 2021-03-26 NOTE — PLAN OF CARE
Problem: Falls - Risk of:  Goal: Will remain free from falls  Description: Will remain free from falls  3/26/2021 0929 by Sumaya Delong RN  Outcome: Met This Shift     Problem: Falls - Risk of:  Goal: Absence of physical injury  Description: Absence of physical injury  3/26/2021 0929 by Sumaya Delong RN  Outcome: Met This Shift     Problem: OXYGENATION/RESPIRATORY FUNCTION  Goal: Patient will maintain patent airway  Outcome: Met This Shift     Problem: OXYGENATION/RESPIRATORY FUNCTION  Goal: Patient will achieve/maintain normal respiratory rate/effort  Description: Respiratory rate and effort will be within normal limits for the patient  Outcome: Met This Shift     Problem: Skin Integrity:  Goal: Will show no infection signs and symptoms  Description: Will show no infection signs and symptoms  3/26/2021 0929 by Sumaya Delong RN  Outcome: Met This Shift     Problem: Skin Integrity:  Goal: Absence of new skin breakdown  Description: Absence of new skin breakdown  3/26/2021 0929 by Sumaya Delong RN  Outcome: Met This Shift     Problem: Fluid Volume:  Goal: Hemodynamic stability will improve  Description: Hemodynamic stability will improve  Outcome: Ongoing     Problem: Respiratory:  Goal: Respiratory status will improve  Description: Respiratory status will improve  Outcome: Ongoing     Problem: HEMODYNAMIC STATUS  Goal: Patient has stable vital signs and fluid balance  Outcome: Ongoing

## 2021-03-26 NOTE — PROGRESS NOTES
Physical Therapy    PT consult to evaluate/treat received and appreciated. Pt chart reviewed and dicussed at quality flow rounds. Pt is currently appropriate for PT services per nursing staff d/t medical stability. Will check back as able. Thank you.         Heena Middleton, PT, DPT   [id number]

## 2021-03-26 NOTE — PROCEDURES
Procedure Note  Thoracentesis     [facility]     [id number]     3/26/2021    Indication: Kaity Sharp Sirisha 66 y.o. male with Pleural Effusion found on imaging. Pre - Operative Diagnosis:   right Pleural Effusion    Post - Operative Diagnosis:  normal    Performed by: Silas Westbrook MD, PGY-1, attending Dr. Demarcus Chester: Nurse    Consent:  Verbal consent obtained. After informed consent & appropriate time out protocol was noted & obtained. Risks/Benefits/Alternatives of the procedure were discussed including: infection, bleeding, pain, & pneumothorax. Procedure Details:  Patient's family understanding: patient states understanding of the procedure being performed. Time out: Immediately prior to procedure a \"time out\" was called to verify the correct. Patient was made up in sitting position and ultrasound was done and site of maximum fluid collection was marked. Preparation: Patient was prepped and draped in the usual sterile fashion. Local anesthesia used: Yes    Local anesthetic: Lidocaine 1% without epinephrine    TECHNIQUE: The patient was placed in the sitting position. The skin was prepped and draped with Chloraprep solution and sterile drapes were utilized. 1% plain lidocaine was used to anesthetize the skin, subcutaneous tissue and parietal pleura. Then the plural catheter was advanced into the pleural space. The fluid was obtained without any difficulties and minimal blood loss. A total of 575 fluid was removed. The fluid was straw colored/cloudy in color. A dressing was applied to the incision area. The fluid was sent for analysis. Findings  We removed 575 ml of cloudy pleural fluid. A sample was sent to Pathology for cytogenetics, flow, and cell counts, as well as for infection Analysis if needed. Typical Laboratory Test Sent;  1. ABG syringe: pH  2. Red Top:   LDH, Glucose, total Protein, Albumin, Cholesterol, CEA &     Triglycerides  3.   Purple Top:  Cell count and Diff  4. Bottle/Cup #1:  Gram Stain with C&S, AFB smear & cultures, & KOH stain     and Fungal cultures. 6.  Bottle/Cup #2 :  Cytology    Complications:  None; patient tolerated the procedure well. Condition: stable    Plan: Chest Xray post procedure ordered to rule out pneumothorax.  Follow up on testing studies    Lindsey Lennox, MD, PGY-1  3/26/2021 4:52 PM    Attending: Dr. Sammie Tidwell

## 2021-03-26 NOTE — PROGRESS NOTES
Chief Complaint:  Chief Complaint   Patient presents with    Shortness of Breath     usually wears 3-4L. Arrived on NRB @ 99% SPo2     Acute on chronic systolic congestive heart failure (HCC)     Subjective:    He is intubated and unresponsive. Objective:    BP 96/62   Pulse 100   Temp 100.5 °F (38.1 °C) (Esophageal)   Resp 25   Ht 6' 1\" (1.854 m)   Wt 260 lb 3.2 oz (118 kg)   SpO2 (!) 89%   BMI 34.33 kg/m²     Current medications that patient is taking have been reviewed. General appearance: Ill-appearing male on the ventilator  HEENT: AT/NC, ETT in place  Neck: FROM, supple  Lungs: Mild basilar rales  CV: RRR, no MRGs  Abdomen: Soft, non-tender; no masses or HSM, +BS  Extremities: No peripheral edema or digital cyanosis  Skin: no rash, lesions or ulcers  Psych: N/A  Neuro:  He was not following commands for me    Labs:  CBC with Differential:    Lab Results   Component Value Date    WBC 10.8 03/26/2021    RBC 4.51 03/26/2021    HGB 13.8 03/26/2021    HCT 42.3 03/26/2021     03/26/2021    MCV 93.8 03/26/2021    MCH 30.6 03/26/2021    MCHC 32.6 03/26/2021    RDW 17.6 03/26/2021    NRBC 2.6 03/26/2021    LYMPHOPCT 0.9 03/26/2021    MONOPCT 3.5 03/26/2021    BASOPCT 0.1 03/26/2021    MONOSABS 0.43 03/26/2021    LYMPHSABS 0.11 03/26/2021    EOSABS 0.00 03/26/2021    BASOSABS 0.00 03/26/2021     CMP:    Lab Results   Component Value Date     03/26/2021    K 5.3 03/26/2021    K 5.7 03/20/2021    CL 98 03/26/2021    CO2 24 03/26/2021    BUN 77 03/26/2021    CREATININE 2.6 03/26/2021    GFRAA 29 03/26/2021    LABGLOM 29 03/26/2021    GLUCOSE 185 03/26/2021    PROT 5.9 03/26/2021    LABALBU 3.0 03/26/2021    CALCIUM 9.1 03/26/2021    BILITOT 4.8 03/26/2021    ALKPHOS 42 03/26/2021     03/26/2021     03/26/2021          Assessment/Plan:  Principal Problem:    Acute on chronic systolic congestive heart failure (HCC)  Active Problems:    Nonischemic cardiomyopathy ([address]    S/P ICD (internal cardiac defibrillator) procedure    Essential hypertension    Chronic anticoagulation    Permanent atrial fibrillation (HCC)    Stage 3 chronic kidney disease    Chronic obstructive pulmonary disease (HCC)    Acute on chronic respiratory failure (HCC)    Supratherapeutic INR    Septic shock (HCC)    Transaminitis  Resolved Problems:    * No resolved hospital problems. *       Multifactorial, very severe respiratory failure    Now with septic shock, probable pneumonia    Vanc/zosyn    ID consult appreciated    Pulmonary HTN, R sided CHF, mild L sided failure, COPD, and now pneumonia are all contributing to severe respiratory failure    Prognosis poor    Still on multiple pressors and stress dose steroids. ANKUR on CKD, continue fluids and albumin    Persistent mild hyperkalemia, nephro on board managing. Now with transaminitis. INR OK. Possible shock liver.   Check RUQ US w/doppler    Requires continued inpatient level of care     Theron Carroll    3:15 PM  3/26/2021

## 2021-03-26 NOTE — PLAN OF CARE
Problem: Falls - Risk of:  Goal: Will remain free from falls  Description: Will remain free from falls  3/25/2021 2123 by Epifanio Mcclain  Outcome: Met This Shift     Problem: Falls - Risk of:  Goal: Absence of physical injury  Description: Absence of physical injury  3/25/2021 2123 by Epifanio Mcclain  Outcome: Met This Shift     Problem: Skin Integrity:  Goal: Will show no infection signs and symptoms  Description: Will show no infection signs and symptoms  3/25/2021 2123 by Epifanio Mcclain  Outcome: Met This Shift     Problem: Skin Integrity:  Goal: Absence of new skin breakdown  Description: Absence of new skin breakdown  3/25/2021 2123 by Epifanio Mcclain  Outcome: Met This Shift

## 2021-03-26 NOTE — PROGRESS NOTES
consistent with pulmonary artery hypertension.  Additionally, there is severe   cardiomegaly with a small pericardial effusion and small, partially loculated   bilateral pleural effusions (right greater than left).     Moderate centrilobular emphysema.       2-3 mm subpleural pulmonary nodule in the right lower lobe.  If the patient   is at high risk for development of lung carcinoma, a follow-up CT of the   chest could be considered in 1 year to document stability.       Mildly enlarged mediastinal lymph nodes, of uncertain etiology or clinical   significance. Resident's Assessment and Plan     [facility]   , 66 y.o. , male came with CC : Hypoxia and hypotension     has a past medical history of Arrhythmia, Atrial fibrillation ([address] CHF (congestive heart failure) ([address] CKD (chronic kidney disease), Hyperlipidemia, Hypertension, Nonischemic cardiomyopathy ([address] and Thyroid disease. SUMMARY OF HOSPITAL STAY: Briefly,  [facility] with past medical history of nonischemic cardiomyopathy status post ICD in 2011, permanent atrial fibrillation on warfarin, heart failure with reduced ejection fraction EF 25% (2018): Chronic kidney disease stage IIIb, COPD on 3 L nasal cannula at home, benign prostatic hyperplasia, hypertension, hyperlipidemia, hypothyroidism, morbid obesity. Initially presented to the ED on 3/19/2021 chief complaint of dyspnea on exertion, and hypoxia. Initial assessment vital signs are stable, satting 99% on high flow nasal cannula. on examination he was fluid overload. proBNP were 12,394. Chest x-ray noted for pulmonary congestion. rest of the labs were unremarkable. He received 40 mg Lasix and aspirin and was admitted to the telemetry floor. On the floor he was unable to wean off high flow nasal cannula required BiPAP. He also had episodes of hypotension and V. Tach. For further care and management he was transferred to the medical ICU.   (March 21)    NEURO  On fentanyl  Open eyes to vocal command      Cardiogenic shock and septic shock likely due to pneumonia   Patient has history of heart failure with reduced ejection fraction EF 25% presented with worsening shortness of breath. He also has severe pulmonary arterial hypertension. Spiking fever last 24    Cardiology and EP following, appreciate recommendations, ablation called off  Currently on Levophed and corry, wean as able  Echo noted for severe right sided Chambers dilations, PAH and EF 35%  Right heart cath is delayed as the patient  Started on zosyen and vancomycin   Follow pan culture     History of atrial fibrillation, hyperlipidemia, hypertension, nonischemic cardiomyopathy status post ICD in 2011  Hold metoprolol in the setting of shock  Start on low dose heparin   Telemetry monitoring  Cardiology and EP following  Hold atorvastatin 20 mg, continue aspirin 81 mg  Digoxin 250 mg given, sock if still in afib     Acute on chronic hypoxic respiratory failure secondary to ADHF versus cardiogenic shock  Patient has a history of COPD requiring 3 L of nasal cannula at home. He was found to have hypoxic and put on high flow nasal cannula at urologist office. Patient desatted on the floor and put on BiPAP. Tolerating BiPAP well. No signs of infection or COPD exacerbation at this point. Chest x-ray noted for bulge in the left suprahilar region suspicious for mass or adenopathy  Breathing treatment with Brovana and DuoNeb and Pulmicort  Intubated and sedated   Thoracocentesis today    Follow tap and bronch studies     Acute kidney injury stage I prerenal on chronic kidney disease stage IIIb  Patient has baseline creatinine 1.7, FeUrea is 20.7 suggesting prerenal cause.    Patient is oliguric and Hyperkalemic   Creatinine is 2.2, this may be his new baseline  Nephrology following, appreciate recommendation    Agree with diuresis     Gross hematuria due to malposition Cohen catheter   Resolved     History of elevated PSA 32, follows with Dr. Pura holley finasteride    Hyperkalemia secondary to renal failure  Patient potassium is consistently above 5.5. Status post calcium gluconate and insulin  Repeat BMP  Monitor electrolytes  Nephrology following     History of hyperlipidemia and hypothyroidism  Hold pravastatin in the setting of transaminitis   Synthroid 125 mcg    Next of Kin/ POA:   Per Smith  [phone]    Code Status:   Full code    PT/OT: Will consult once patient is stable  DVT ppx: PCD  GI ppx: Protonix  Disposition: Continue ICU care   Nutrition: Start tube feed     Taj Calabrese MD, PGY-1  Internal Medicine   Attending physician: Dr. Agustin Bueno     I personally saw, examined and provided care for the patient. Radiographs, labs and medication list were reviewed by me independently. I spoke with bedside nursing, therapists and consultants. Critical care services and times documented are independent of procedures and multidisciplinary rounds with Residents. Additionally comprehensive, multidisciplinary rounds were conducted with the MICU team. The case was discussed in detail and plans for care were established. Review of Residents documentation was conducted and revisions were made as appropriate. I agree with the above documented exam, problem list and plan of care. Thoracentesis today   Abx   Sean Watson M.D.    Pulmonary/Critical Care Medicine   36 min cct excluding procedures

## 2021-03-26 NOTE — PROGRESS NOTES
[facility] Quality Flow/Interdisciplinary Rounds Progress Note        Quality Flow Rounds held on March 26, 2021    Disciplines Attending:  Bedside nurse, charge nurse, , PT/OT, pharmacy, nursing unit leadership, , Medical residents    Marquis Terrazas was admitted on 3/19/2021 11:34 AM    Anticipated Discharge Date:       Disposition:    Srinivas Score:  Srinivas Scale Score: 12    Readmission Risk              Risk of Unplanned Readmission:        24           Discussed patient goal for the day, patient clinical progression, and barriers to discharge.   The following Goal(s) of the Day/Commitment(s) have been identified:  Continue current plan of care      Emily Peng  March 26, 2021

## 2021-03-26 NOTE — PROGRESS NOTES
Occupational Therapy      OT consult received and appreciated. Chart reviewed. Dsicussed pt case with RN. Will hold evaluation due to pt's unstable respiratory status . Will evaluate at a later time. Thank you.  Ricky Prater, OTR/L #[id number]

## 2021-03-26 NOTE — PLAN OF CARE
Problem: Falls - Risk of:  Goal: Will remain free from falls  Description: Will remain free from falls  3/26/2021 0014 by Epifanio Mcclain  Outcome: Met This Shift     Problem: Falls - Risk of:  Goal: Absence of physical injury  Description: Absence of physical injury  3/26/2021 0014 by Epifanio Mclcain  Outcome: Met This Shift

## 2021-03-27 NOTE — PROGRESS NOTES
Pharmacy Consultation Note  (Antibiotic Dosing and Monitoring)    Initial consult date: 3/25/21  Consulting physician: Dr. Fabiano Mccormack  Drug: Vancomycin  Indication: Sepsis    Age/  Gender Height Weight IBW Dosing weight  Allergy Information   75 y.o./male 6' 1\" 112.7 kg   Ideal body weight: 79.9 kg (176 lb 2.4 oz)  Adjusted ideal body weight: 96.2 kg (212 lb 2.1 oz)  93 kg  Patient has no known allergies. Date  WBC SCr CrCl  (mL/min) Drug/Dose Time   Given Level(s)   (Time) Comments   3/25 12.7 2.6  31   Vancomycin 2250 mg IV x1  0156     3/26 10.8 2.6 32 Vancomycin 1750 mg IV q36h 1239     3/27 11 2.9 29 Vancomycin 1750 mg IV q36h -                     Intake/Output Summary (Last 24 hours) at 3/27/2021 0831  Last data filed at 3/27/2021 0800  Gross per 24 hour   Intake 2748. 59 ml   Output 512 ml   Net 2236.59 ml       Historical Cultures:  Organism   Date Value Ref Range Status   03/25/2021 Staphylococcus epidermidis (A)  Final     Recent Labs     03/24/21  2049   BC 24 Hours no growth       Cultures:  available culture and sensitivity results were reviewed in [facility]    Assessment:  · 66 y.o. male presently intubated and sedated 2/2 acute hypoxic respiratory initiated on empiric antibiotics for septic shock as a result of possible aspiration pneumonia. Pharmacy has been consulted to dose/monitor vancomycin.    · Goal trough level = 15-20 mcg/mL  · Resp Cx MRSE      Plan:  · Vancomycin 1750 mg IV q36h  · Monitor renal function   · Clinical pharmacy to follow    Tadeo Sesay PharmD, BCPS 3/27/2021 8:31 AM

## 2021-03-27 NOTE — PLAN OF CARE
Problem: Falls - Risk of:  Goal: Will remain free from falls  3/27/2021 1831 by Taryn Bueno RN  Outcome: Met This Shift     Problem: Falls - Risk of:  Goal: Absence of physical injury  3/27/2021 1831 by Taryn Bueno RN  Outcome: Met This Shift     Problem: OXYGENATION/RESPIRATORY FUNCTION  Goal: Patient will maintain patent airway  Outcome: Met This Shift     Problem: OXYGENATION/RESPIRATORY FUNCTION  Goal: Patient will achieve/maintain normal respiratory rate/effort  3/27/2021 1831 by Taryn Bueno RN  Outcome: Met This Shift     Problem: Skin Integrity:  Goal: Will show no infection signs and symptoms  3/27/2021 1831 by Taryn Bueno RN  Outcome: Met This Shift     Problem: Skin Integrity:  Goal: Absence of new skin breakdown  3/27/2021 1831 by Taryn Bueno RN  Outcome: Met This Shift

## 2021-03-27 NOTE — PROGRESS NOTES
Subjective: Intubated sedated mechanically ventilated  Family at bedside all questions answered     Objective:    BP (!) 103/59   Pulse 112   Temp 100.7 °F (38.2 °C) (Esophageal)   Resp 26   Ht 6' 1\" (1.854 m)   Wt 266 lb 1.6 oz (120.7 kg)   SpO2 92%   BMI 35.11 kg/m²     24HR INTAKE/OUTPUT:      Intake/Output Summary (Last 24 hours) at 3/27/2021 1403  Last data filed at 3/27/2021 1200  Gross per 24 hour   Intake 1562.59 ml   Output 424 ml   Net 1138.59 ml       General appearance: Intubated sedated mechanically ventilated   Neck: FROM, supple   Lungs: Clear bilaterally no wheezes, no rhonchi, no crackles  CV: RRR, no MRGs; normal carotid upstroke and amplitude without Bruits  Abdomen: Soft, non-tender; no masses or HSM  Extremities: No edema, no cyanosis, no clubbing  Skin: Intact no rash, no lesions, no ulcers    Psych: Alert and oriented normal affect  Neuro: Nonfocal  Most Recent Labs  Lab Results   Component Value Date    WBC 11.9 (H) 03/27/2021    HGB 12.9 03/27/2021    HCT 39.4 03/27/2021    PLT 86 (L) 03/27/2021     (L) 03/27/2021    K 5.1 (H) 03/27/2021    CL 92 (L) 03/27/2021    CREATININE 3.0 (H) 03/27/2021    BUN 85 (H) 03/27/2021    CO2 22 03/27/2021    GLUCOSE 175 (H) 03/27/2021     (H) 03/27/2021     (H) 03/27/2021    INR 1.9 03/27/2021    TSH 0.857 03/22/2021     Recent Labs     03/27/21  1232   MG 2.3     Lab Results   Component Value Date    CALCIUM 9.3 03/27/2021    PHOS 4.4 03/27/2021        XR CHEST PORTABLE   Final Result   1. Compared to the chest radiograph from earlier today, there is mild   decrease in the right parahilar and basilar pulmonary infiltrate. 2.  The life-support lines and tubes are well positioned. 3. Cardiomegaly with evidence of pulmonary arterial hypertension.          XR CHEST PORTABLE   Final Result   Decreased size and prominence of right lower lung infiltrate      Stable moderate cardiomegaly      Stable enlargement of main pulmonary artery, again suggestive of pulmonary   arterial hypertension      No evidence of pneumothorax after thoracentesis. US THORACENTESIS Which side should the procedure be performed? Right   Final Result   Ultrasound utilized for thoracentesis. Please refer to operative note for   further details. US ABDOMEN LIMITED Specify organ? LIVER, GALLBLADDER   Final Result   Mild to moderate ascites mostly in the subphrenic space. Thickening of the gallbladder wall likely related to pseudo thickening from   ascites. Multiple right renal cysts. Prominent pancreatic duct measuring 2.7 mm. Please consider abdominal MRI   with MRCP if clinically appropriate. US DUP ABD PEL RETRO SCROT COMPLETE   Final Result   Unremarkable exam.         XR CHEST PORTABLE   Final Result   Parenchymal infiltrates are similar to subtly improved. Continued follow-up   recommended. XR CHEST PORTABLE   Final Result   No evidence of pneumothorax. Continued cardiomegaly and bibasilar infiltrates. XR CHEST PORTABLE   Final Result   New right internal jugular vein catheter tip overlies the SVC. Otherwise, no   significant change from earlier today. XR CHEST PORTABLE   Final Result   No interval change         XR CHEST PORTABLE   Final Result   Improving aeration likely due to improving edema. Worsened right basilar opacities which may represent atelectasis. Continued   follow-up recommended. XR CHEST PORTABLE   Final Result   Endotracheal tube and enteric feeding tube appears in good position. Mild to moderate pulmonary edema. XR CHEST PORTABLE   Final Result   1. No interval change in the bilateral perihilar and lower lobe airspace   disease. 2. Significant cardiomegaly. Typical pattern of CHF is not appreciated. XR CHEST PORTABLE   Final Result   1. Stable pneumonia or interstitial pulmonary edema in perihilar locations in   lung bases.    2. Stable cardiomegaly. XR CHEST PORTABLE   Final Result   Worsening infiltrate in right lower lobe         CT CHEST WO CONTRAST   Final Result   No evidence of a pulmonary nodule in the area of abnormality noted on recent   chest radiograph. The radiographic finding was reflective of the severely   central pulmonary arteries. The main pulmonary artery is dilated up to 6 cm,   consistent with pulmonary artery hypertension. Additionally, there is severe   cardiomegaly with a small pericardial effusion and small, partially loculated   bilateral pleural effusions (right greater than left). Moderate centrilobular emphysema. 2-3 mm subpleural pulmonary nodule in the right lower lobe. If the patient   is at high risk for development of lung carcinoma, a follow-up CT of the   chest could be considered in 1 year to document stability. Mildly enlarged mediastinal lymph nodes, of uncertain etiology or clinical   significance. XR ABDOMEN (KUB) (SINGLE AP VIEW)   Final Result   Nonspecific abdomen. XR CHEST PORTABLE   Final Result   Contour bulge in the left suprahilar region suspicious for mass or   adenopathy. Recommend further evaluation with CT of the chest.      Cardiomegaly with dual-chamber pacemaker-AICD in place. Increased interstitial markings, likely chronic. XR CHEST PORTABLE   Final Result   Patchy bilateral lower lobe airspace disease. Given the marked cardiomegaly   findings could represent CHF or pneumonia.          XR CHEST PORTABLE    (Results Pending)   XR CHEST PORTABLE    (Results Pending)   US LOWER EXTREMITY BILATERAL VEIN MAPPING W DVT    (Results Pending)       Assessment    Principal Problem:    Acute on chronic systolic congestive heart failure (HCC)  Active Problems:    Nonischemic cardiomyopathy (HCC)    S/P ICD (internal cardiac defibrillator) procedure    Essential hypertension    Chronic anticoagulation    Permanent atrial fibrillation (HCC)    Stage

## 2021-03-27 NOTE — PROGRESS NOTES
Pharmacy Consultation Note  (Anticoagulant Dosing and Monitoring)    Initial consult date: 3/21/21  Consulting physician: Dr Tonia Peraza    Allergies:  Patient has no known allergies. 66 y.o. male      Ht Readings from Last 1 Encounters:   03/24/21 6' 1\" (1.854 m)     Wt Readings from Last 1 Encounters:   03/27/21 266 lb 1.6 oz (120.7 kg)         Warfarin Indication Target   INR [place] Dose  (if applicable) Diet/Feeding Tube   Afib 2 - 3 2 mg five times weekly  3 mg two times weekly NPO     Vitamin K or Blood product  Administration Date     Phytonadione 5 mg x1 3/23         Warfarin drug-drug interactions  Start  Stop Home Med? Comments    Allopurinol 100 mg PO BID 3/20  Yes    ASA 81 mg PO daily 3/19  Yes    Levothyroxine 125 mcg PO daily 3/19  Yes            Hepatic Function Panel:                            Lab Results   Component Value Date    ALKPHOS 42 03/26/2021     03/26/2021     03/26/2021    PROT 5.8 03/26/2021    BILITOT 4.8 03/26/2021    BILIDIR 0.5 03/23/2021    IBILI 0.4 03/23/2021    LABALBU 3.0 03/26/2021       Date Warfarin Dose INR Heparin or LMWH HBG/HCT PLT Comment   3/20 2 mg (0018)  2 mg (1606) 2.6 -- 14.6/46.7 153    3/21 Hold dose 2.9 -- 14.3/45.1 136 Pharmacy consulted   3/22 Hold 4.4 -- -- --    3/23 Hold 7.1 -- 14.5/45 133 Phytonadione 5 mg x1 for procedure tomorrow   3/24 Hold 2.1 -- 14.4/45.8 144    3/25 Hold 2 -- 14.2/43.2 142    2/26 Hold 1.9 -- 13.8/42.3 111               Assessment:  · 65 yo M admitted with SOB and acute decompensated HF  · Pt on chronic anticoagulation with Warfarin for Afib - home regimen is Warfarin 3 mg on Tu/Th and 2 mg the rest of the week   · Pharmacy consulted on 3/21 per Dr Tonia Peraza to dose/manage Warfarin  · INR goal 2 - 3    Plan:  · Patient remains on heparin drip - Clinical pharmacy will sign off. · Please re-consult when warfarin is to resume. Will continue to follow. Thank you for the consult.   Liliana Leblanc, PharmD, BCPS 3/27/2021 8:31 AM

## 2021-03-27 NOTE — PROGRESS NOTES
Department of Internal Medicine  Nephrology Progress Note      Events reviewed. SUBJECTIVE:  We are following [address] for oliguria and CKD. Patient continues to be intubated. Physical Exam:    VITALS:  BP (!) 76/44   Pulse 101   Temp 100.7 °F (38.2 °C) (Esophageal)   Resp 25   Ht 6' 1\" (1.854 m)   Wt 266 lb 1.6 oz (120.7 kg)   SpO2 98%   BMI 35.11 kg/m²   24HR INTAKE/OUTPUT:      Intake/Output Summary (Last 24 hours) at 3/27/2021 0819  Last data filed at 3/27/2021 0600  Gross per 24 hour   Intake 2748. 59 ml   Output 477 ml   Net 2271.59 ml       Access: Right IJ central line and peripheral lines  Constitutional: Severe respiratory distress, intubated and sedated and on pressors  HEENT: Pupils equal and reactive to light, intubated,  NECK: Neck is supple  Respiratory: No rhonchi or wheezing, currently requiring ventilatory support  Cardiovascular/Edema: Regular rate and rhythm, no murmurs or other heart sounds, pitting edema 2+ in nature from feet to bilateral buttocks  Gastrointestinal: Soft and nontender to the touch  Musculoskeletal: Normal bulk  Neurologic: Pupils equal and reactive to light, Babinski's reflex intact, withdrawal from pain intact  Other: Edematous - stable     Scheduled Meds:   piperacillin-tazobactam  3,375 mg Intravenous Q8H    heparin (porcine)  60 Units/kg Intravenous Once    hydrocortisone sodium succinate PF  100 mg Intravenous Q8H    vancomycin  1,750 mg Intravenous Q36H    sodium chloride flush  10 mL Intravenous 2 times per day    chlorhexidine  15 mL Mouth/Throat BID    midazolam  4 mg Intravenous Once    [Held by provider] bumetanide  1 mg Intravenous BID    [Held by provider] metoprolol succinate  25 mg Oral BID    [Held by provider] digoxin  62.5 mcg Intravenous Daily    pantoprazole  40 mg Oral QAM AC    budesonide  0.5 mg Nebulization BID    Arformoterol Tartrate  15 mcg Nebulization BID    [Held by provider] allopurinol  100 mg Oral BID    aspirin  81 mg Oral Daily    ferrous sulfate  325 mg Oral Daily with breakfast    finasteride  5 mg Oral Daily    [Held by provider] isosorbide mononitrate  30 mg Oral Daily    levothyroxine  125 mcg Oral Daily    [Held by provider] pravastatin  20 mg Oral Daily    ipratropium-albuterol  1 ampule Inhalation Q4H WA    sodium chloride flush  10 mL Intravenous 2 times per day     Continuous Infusions:   heparin (PORCINE) Infusion 8 Units/kg/hr (03/27/21 4531)    dextrose 5 % and 0.9 % NaCl 40 mL/hr at 03/25/21 1323    norepinephrine 8 mcg/min (03/27/21 0545)    fentaNYL 5 mcg/ml in 0.9%  ml infusion 75 mcg/hr (03/26/21 2023)    vasopressin (Septic Shock) infusion 0.03 Units/min (03/27/21 0438)    dextrose       PRN Meds:.heparin (porcine), heparin (porcine), sodium chloride flush, glucose, dextrose, glucagon (rDNA), dextrose, sodium chloride flush, magnesium hydroxide, acetaminophen **OR** acetaminophen, perflutren lipid microspheres    DATA:    CBC:   Lab Results   Component Value Date    WBC 11.0 03/27/2021    RBC 4.21 03/27/2021    HGB 13.1 03/27/2021    HCT 39.6 03/27/2021    MCV 94.1 03/27/2021    MCH 31.1 03/27/2021    MCHC 33.1 03/27/2021    RDW 17.4 03/27/2021    PLT 90 03/27/2021    MPV 12.6 03/27/2021     CMP:    Lab Results   Component Value Date     03/27/2021    K 5.1 03/27/2021    K 5.7 03/20/2021    CL 92 03/27/2021    CO2 22 03/27/2021    BUN 87 03/27/2021    CREATININE 2.9 03/27/2021    GFRAA 26 03/27/2021    LABGLOM 26 03/27/2021    GLUCOSE 175 03/27/2021    PROT 5.8 03/26/2021    LABALBU 3.0 03/26/2021    CALCIUM 9.3 03/27/2021    BILITOT 4.8 03/26/2021    ALKPHOS 42 03/26/2021     03/26/2021     03/26/2021     Magnesium:    Lab Results   Component Value Date    MG 2.4 03/27/2021     Phosphorus:    Lab Results   Component Value Date    PHOS 4.1 03/27/2021       Radiology Review:      Impression   Parenchymal infiltrates are similar to subtly improved.  Continued follow-up   recommended.                 BRIEF SUMMARY OF INITIAL CONSULT:    Briefly Dario Finney is 66 y.o. male with history of nonischemic cardiomyopathy status post ICD (EF 25%), permanent atrial fibrillation, at coagulation with Coumadin, chronic heart failure with reduced ejection fraction (25%), CKD, COPD hypertension, and hypothyroidism, who was admitted on 3/19/2021 dizziness and presyncopal events with hypoxia. Patient was found to have right ventricular failure, left ventricular failure severe pulmonary hypertension, and severe tricuspid regurgitation and was being evaluated for ICD/pacemaker insertion with AV node ablation. Patient was intubated for procedure and developed fevers and hypotension. Nephrology has been consulted for evaluation of patient's continued oliguric state with edema and worsening chronic kidney disease. IMPRESSION/RECOMMENDATIONS:      ANKUR stage III on CKD; ischemic ATN, oliguric. Creatinine level continues to worsen, he remains hemodynamically and is stable and largely edematous. Minimal urine output after Bumex administration. We will proceed with CRRT. CKD IIIa, likely cardio-renal syndrome versus nephrosclerosis   Hyperkalemia 2/2 reduced GFR, potassium level improved.   HFrEF (EF 25%) s/p ICD with recovery to 35% EF  Shock: septic vs cardiogenic   ---------------------------------------  Severe Pulmonary Hypertension  Right Ventricular Failure   Severe Tricuspid Regurgitation   Persistant Atrial Fibrillation with anticoagulation on coumaddin   Nutrition, n.p.o., started on TF    PLAN:    CVVHD 25 cc/kilo/hour, 100 cc/hour fluid removal  Continue to monitor UOP   Discontinue IV fluids  Continue to monitor renal function  Continue to monitor K levels           Electronically signed by J Luis Jacques MD on 3/27/2021 at 8:19 AM

## 2021-03-27 NOTE — PLAN OF CARE
Problem: Falls - Risk of:  Goal: Will remain free from falls  Description: Will remain free from falls  3/27/2021 1138 by Aretha Roe  Outcome: Met This Shift     Problem: Falls - Risk of:  Goal: Absence of physical injury  Description: Absence of physical injury  3/27/2021 1138 by Aretha Roe  Outcome: Met This Shift     Problem: OXYGENATION/RESPIRATORY FUNCTION  Goal: Patient will achieve/maintain normal respiratory rate/effort  Description: Respiratory rate and effort will be within normal limits for the patient  3/27/2021 1138 by Aretha Roe  Outcome: Met This Shift     Problem: ACTIVITY INTOLERANCE/IMPAIRED MOBILITY  Goal: Mobility/activity is maintained at optimum level for patient  Outcome: Not Met This Shift     Problem: Skin Integrity:  Goal: Will show no infection signs and symptoms  Description: Will show no infection signs and symptoms  Outcome: Met This Shift  Goal: Absence of new skin breakdown  Description: Absence of new skin breakdown  Outcome: Met This Shift     Problem: Fluid Volume:  Goal: Hemodynamic stability will improve  Description: Hemodynamic stability will improve  Outcome: Met This Shift  Goal: Ability to maintain a balanced intake and output will improve  Description: Ability to maintain a balanced intake and output will improve  Outcome: Not Met This Shift

## 2021-03-27 NOTE — PROGRESS NOTES
[facility]  Department of Internal Medicine   Internal Medicine Residency   MICU Progress Note    Patient:  Merna Croft 66 y.o. male  MRN: [id number]     Date of Service: 3/27/2021    Allergy: Patient has no known allergies. Subjective     Patient was seen and examined this morning   Sedated and intubated  For CVVHD  HD catheter placed    24h change: No acute event overnight     Objective     VS: BP (!) 103/59   Pulse 112   Temp 100.7 °F (38.2 °C) (Esophageal)   Resp 26   Ht 6' 1\" (1.854 m)   Wt 266 lb 1.6 oz (120.7 kg)   SpO2 92%   BMI 35.11 kg/m²   ABP (Arterial line BP): 103/59  ABP mean (Arterial line mean): 69 mmHg    I & O - 24hr:     Intake/Output Summary (Last 24 hours) at 3/27/2021 1418  Last data filed at 3/27/2021 1200  Gross per 24 hour   Intake 1562.59 ml   Output 424 ml   Net 1138.59 ml       Physical Exam:   General Appearance: Sedated intubated  Neck: no adenopathy, no carotid bruit, no JVD, supple, symmetrical, trachea midline and thyroid not enlarged, symmetric, no tenderness/mass/nodules  Lung: rhonchi bilaterally  Heart: irregular rate, S1, S2 normal, no murmur, click, rub or gallop  Abdomen: soft, non-tender; bowel sounds normal; no masses,  no organomegaly  Extremities:  extremities normal, atraumatic, no cyanosis or edema  Musculoskeletal: No joint swelling, no muscle tenderness. ROM normal in all joints of extremities.    Neurologic: Sedated and intubated    Lines     site day    Art line   L Radial    TLC RIJ 3   PICC None    Hemoaccess None    Oxygen:    Intubated   Mechanical Ventilation:    ABG:     Lab Results   Component Value Date    PH 7.360 03/27/2021    PCO2 38.2 03/27/2021    PO2 75.1 03/27/2021    HCO3 21.1 03/27/2021    BE -3.9 03/27/2021    THB 14.0 03/27/2021    O2SAT 94.0 03/27/2021        Medications     Infusions: (Fluid, Sedation, Vasopressors)  IVF:     Vasopressors  Robe and vaso     Sedation  fentanyl     Nutrition:     General diet ATB:   Antibiotics  Days   Vanc and Zyosen  3           Cultures:   negative     Consults:   Cardiology   EP  Nephro   ID    Labs     CBC with Differential:    Lab Results   Component Value Date    WBC 11.9 03/27/2021    RBC 4.15 03/27/2021    HGB 12.9 03/27/2021    HCT 39.4 03/27/2021    PLT 86 03/27/2021    MCV 94.9 03/27/2021    MCH 31.1 03/27/2021    MCHC 32.7 03/27/2021    RDW 17.6 03/27/2021    NRBC 2.6 03/27/2021    LYMPHOPCT 0.9 03/27/2021    MONOPCT 4.3 03/27/2021    MYELOPCT 0.9 03/27/2021    BASOPCT 0.1 03/27/2021    MONOSABS 0.48 03/27/2021    LYMPHSABS 0.12 03/27/2021    EOSABS 0.00 03/27/2021    BASOSABS 0.00 03/27/2021     CMP:    Lab Results   Component Value Date     03/27/2021    K 5.1 03/27/2021    K 5.7 03/20/2021    CL 92 03/27/2021    CO2 22 03/27/2021    BUN 85 03/27/2021    CREATININE 3.0 03/27/2021    GFRAA 25 03/27/2021    LABGLOM 25 03/27/2021    GLUCOSE 175 03/27/2021    PROT 6.2 03/27/2021    LABALBU 3.6 03/27/2021    CALCIUM 9.3 03/27/2021    BILITOT 4.2 03/27/2021    ALKPHOS 29 03/27/2021     03/27/2021     03/27/2021     Warfarin PT/INR:  No components found for: PTPATWAR, PTINRWAR  ABG:    Lab Results   Component Value Date    PH 7.360 03/27/2021    PCO2 38.2 03/27/2021    PO2 75.1 03/27/2021    HCO3 21.1 03/27/2021    BE -3.9 03/27/2021    O2SAT 94.0 03/27/2021       Imaging Studies:  CXR:   Chest x ray march 22   Heart is significantly enlarged.  Some prominence of the pulmonary arteries   suggest pulmonary hypertension. [place] is worsening infiltrate in the right   lower lobe.  Evaluation left retrocardiac region is limited.  There are no   obvious effusions.           ICT chest, 21 march 2020    No evidence of a pulmonary nodule in the area of abnormality noted on recent   chest radiograph.  The radiographic finding was reflective of the severely   central pulmonary arteries.  The main pulmonary artery is dilated up to 6 cm,   consistent with pulmonary artery hypertension.  Additionally, there is severe   cardiomegaly with a small pericardial effusion and small, partially loculated   bilateral pleural effusions (right greater than left).     Moderate centrilobular emphysema.       2-3 mm subpleural pulmonary nodule in the right lower lobe.  If the patient   is at high risk for development of lung carcinoma, a follow-up CT of the   chest could be considered in 1 year to document stability.       Mildly enlarged mediastinal lymph nodes, of uncertain etiology or clinical   significance. Resident's Assessment and Plan     [facility]   , 66 y.o. , male came with CC : Hypoxia and hypotension     has a past medical history of Arrhythmia, Atrial fibrillation ([address] CHF (congestive heart failure) ([address] CKD (chronic kidney disease), Hyperlipidemia, Hypertension, Nonischemic cardiomyopathy ([address] and Thyroid disease. SUMMARY OF HOSPITAL STAY: Briefly,  [facility] with past medical history of nonischemic cardiomyopathy status post ICD in 2011, permanent atrial fibrillation on warfarin, heart failure with reduced ejection fraction EF 25% (2018): Chronic kidney disease stage IIIb, COPD on 3 L nasal cannula at home, benign prostatic hyperplasia, hypertension, hyperlipidemia, hypothyroidism, morbid obesity. Initially presented to the ED on 3/19/2021 chief complaint of dyspnea on exertion, and hypoxia. Initial assessment vital signs are stable, satting 99% on high flow nasal cannula. on examination he was fluid overload. proBNP were 12,394. Chest x-ray noted for pulmonary congestion. rest of the labs were unremarkable. He received 40 mg Lasix and aspirin and was admitted to the telemetry floor. On the floor he was unable to wean off high flow nasal cannula required BiPAP. He also had episodes of hypotension and V. Tach. For further care and management he was transferred to the medical ICU.   (March 21)    NEURO  On fentanyl  Open eyes to vocal command      Cardiogenic shock and septic shock likely due to pneumonia   Patient has history of heart failure with reduced ejection fraction EF 25% presented with worsening shortness of breath. He also has severe pulmonary arterial hypertension. Cardiology and EP following, appreciate recommendations, ablation called off  Currently on Levophed and corry, wean as able  Echo shows severe right sided Chambers dilations, PAH and EF 35%  Continue zosyen and vancomycin   Respiratory culture growing staph epidermidis  ID following, appreciate recommendations    Transaminitis secondary to shock  Patient is hemodynamically unstable, septic shock  Transaminases trending down  Continue holding statin  Abdominal ultrasound shows pancreatic duct increase in size to 2.7 millimeter  Lipase is 18    History of atrial fibrillation, hyperlipidemia, hypertension, nonischemic cardiomyopathy status post ICD in 2011  Hold metoprolol in the setting of shock  On low dose heparin   Telemetry monitoring  Cardiology and EP following  continue aspirin 81 mg  Digoxin 250 mg given, shock if still in afib     Acute on chronic hypoxic respiratory failure secondary to ADHF versus cardiogenic shock  Patient has a history of COPD requiring 3 L of nasal cannula at home. He was found to have hypoxic and put on high flow nasal cannula at urologist office. Patient desatted on the floor and put on BiPAP. Tolerating BiPAP well. No signs of infection or COPD exacerbation at this point. Chest x-ray noted for bulge in the left suprahilar region suspicious for mass or adenopathy  Breathing treatment with Brovana and DuoNeb and Pulmicort  Intubated and sedated  Requiring high FiO2, wean as tolerated  S/p bronc and thoracocentesis   Thoracentesis is exudative  with no microorganism    Acute kidney injury stage I prerenal on chronic kidney disease stage IIIb  Patient has baseline creatinine 1.7, FeUrea is 20.7 suggesting prerenal cause.    Patient is oliguric and Hyperkalemic   Creatinine is 2.9  Nephrology following, appreciate recommendation    Decreased urine output despite diuretic  For CVVHD by nephrology    Gross hematuria due to malposition Cohen catheter   Resolved     History of elevated PSA 27, follows with Dr. Alejandra [email or web address] finasteride    Hyperkalemia secondary to renal failure  Patient potassium is consistently above 5.5.   Status post calcium gluconate and insulin  Repeat BMP  Monitor electrolytes  Nephrology following     Thrombocytopenia secondary to unknown  Patient was started on heparin yesterday unlikely HIT  Follow DIC panel    History of hyperlipidemia and hypothyroidism  Hold pravastatin in the setting of transaminitis   Synthroid 125 mcg    Next of Kin/ POA:   Irma Duckworth  Sister  [phone]    Code Status:   Full code    PT/OT: Will consult once patient is stable  DVT ppx: PCD  GI ppx: Protonix  Disposition: Continue ICU care   Nutrition: Tube feeds at goal 25    Bethany Ibarra MD, PGY-1  Internal Medicine   Attending physician: Dr. Halima Becker

## 2021-03-27 NOTE — PLAN OF CARE
Problem: Falls - Risk of:  Goal: Will remain free from falls  Description: Will remain free from falls  3/27/2021 0035 by Alessio Walker RN  Outcome: Met This Shift     Problem: Falls - Risk of:  Goal: Absence of physical injury  Description: Absence of physical injury  3/27/2021 0035 by Alessio Walker RN  Outcome: Met This Shift     Problem: OXYGENATION/RESPIRATORY FUNCTION  Goal: Patient will maintain patent airway  3/27/2021 0035 by Alessio Walker RN  Outcome: Met This Shift     Problem: OXYGENATION/RESPIRATORY FUNCTION  Goal: Patient will achieve/maintain normal respiratory rate/effort  Description: Respiratory rate and effort will be within normal limits for the patient  3/27/2021 0035 by Alessio Walker RN  Outcome: Met This Shift

## 2021-03-28 PROCEDURE — 6360000002 HC RX W HCPCS

## 2021-03-28 PROCEDURE — 2580000003 HC RX 258

## 2021-03-28 PROCEDURE — 2500000003 HC RX 250 WO HCPCS

## 2021-03-28 NOTE — SIGNIFICANT EVENT
Code blue note  Informed at [address] [facility], for hypotension and HR 190s. Patient at this time currently maxed out on 4 pressors. CVVHD was stopped and given 1 dose of epi, 1 amp of bicarb. Rhythm then showed vtach with pulse, given 150 mg of amio bolus and 1 amp of calcium chloride. Repeat pulse check shows Vtach with pulse, shocked at 200 J. No pulse was then appreciated. ACLS protocol was initiated and the following was given:  Adrenaline: 2 doses of 1 mg epi  Calcium gluconate: 1 dose   Sodium bicarbonate: 1 amp  Magnesium: 2 mg   Shock: 3x ( 200J, 300J, 360J)  Lidocaine: 1 amp     We continued CPR for 24 mins, and pt was revived. Family, PCP and Intensives were updated. Patient continues to have different kinds of arrhythmia from SVT, Vtach, torsades, and had about 3 x that his defibrillator gave shocks as well. EP was updated who recommended, giving another 150 mg amio + drip, and continuing lidocaine drip as well. Was called again at 2254, no pulse appreciated, rhythm showing PEA, CPR started was given epi. ROSC achieved after 2 doses of 1mg epi. Family was updated,while communicating with them another Code blue was called, family the decided to decline any further resuscitative measures.      Michelle Escamilla MD  Internal Medicine resident  3/27/2021

## 2021-03-28 NOTE — PROGRESS NOTES
Called and updated patient's daughter Estella Portillo that  home isn't answering and patient will be going to [facility].

## 2021-03-28 NOTE — DEATH NOTES
Death Note   Called to pronounce death. On exam the patient was unresponsive, no spontaneous movement was observed. Patient did not respond to verbal or noxious stimuli. Absent heart and breath sounds for more than 1 minute. Pupils are 6mm fixed and dilated, corneal reflex was absent. No gag reflex. Patient pronounced dead at 1  on 3/27/2021    Dr. Flaco Hernandez notified. Family at bedside prior to event, and was informed thereafter   Autopsy declined. Cecilia Wu M.D., PGY 3  Internal Medicine

## 2021-03-28 NOTE — CODE DOCUMENTATION
CODE BLUE    1950: epi 1 mg, 1 amp bicarb given, CVV stopped and blood returned to patient  1955: [facility] [address]: 150 amio bolus for [address]: 1 amp calcium chloride  2000: pulse check- Vtach, 200 joules shock given  2001: no pulses, CPR started  2003: pulse check, no pulse CPR resumed  2004: 1mg epi  2005: pulse check, Vtach  2008: corry started  2010: heparin stopped  2011: epi and dopamine drips started  2014: lidocaine 100mg push  2020: corry off  2022: lidocaine gtt started  2024: Vfib arrest broken     2033: 150 amio given for Vtach  2036: amio drip started

## 2021-03-28 NOTE — PROCEDURES
Hemodialysis catheter Insertion     Procedure: right femoral vein  Hemodialysis Catheter placement. Indications: Hemodialysis     Consent: The family members were counseled regarding the procedure, its indications, risks, potential complications and alternatives, and any questions were answered. Consent was obtained to proceed. Number of sticks: 1    Number of Kits used: 1    Procedure: Time Out: Immediately prior to the procedure a \"timeout\" was called to verify the correct patient and procedure. The patient was place in the trendelenburg position and the skin over the right femoral vein was prepped with betadine and draped in a sterile fashion. Local anesthesia was obtained by infiltration using 1% Lidocaine without epinephrine. With Ultrasound guidance a large bore needle was used to identify the vein, dark non pulsatile blood returned. The guide wire was then inserted through the needle with minimal resistance. 2 mm nick was made in the skin beside the guidewire. Then a dilator was inserted and removed. Second dilator was inserted. Temporary HD catheter was then inserted into the vessel over the guide wire using the Seldinger technique to the  24 cm marcella. All ports showed good, free flowing blood return and were flushed with saline solution. The catheter was then securely fastened to the skin with sutures and covered with a bio patch and sterile dressing. Heprin was pushed into each line as per recommended mls. Clamps locked and caps placed      Complications: None   The patient tolerated the procedure well. Estimated blood loss: 5 ml.     Heparin was placed into both lumens and locked      Jama Piper MD PGY-2  3/28/2021  4:14 AM      Attending: Dr Glenda Lazo

## 2021-03-28 NOTE — PROGRESS NOTES
Attempted to call [facility] twice, no answer and unable to leave voicemail. Phone number [phone].

## 2021-03-28 NOTE — PROGRESS NOTES
Called [facility] to notify them of patient passing, patient ruled out as organ donor.  Referral number [id number]

## 2021-03-29 LAB
BLOOD CULTURE, ROUTINE: NORMAL
BODY FLUID CULTURE, STERILE: NORMAL
CULTURE, BLOOD 2: NORMAL
GRAM STAIN RESULT: NORMAL

## 2021-03-31 LAB
REPORT: NORMAL
THIS TEST SENT TO: NORMAL

## 2021-05-03 LAB
FUNGUS (MYCOLOGY) CULTURE: NORMAL
FUNGUS STAIN: NORMAL

## 2021-05-18 LAB
AFB CULTURE (MYCOBACTERIA): NORMAL
AFB SMEAR: NORMAL